# Patient Record
Sex: MALE | Race: BLACK OR AFRICAN AMERICAN | NOT HISPANIC OR LATINO | Employment: FULL TIME | URBAN - METROPOLITAN AREA
[De-identification: names, ages, dates, MRNs, and addresses within clinical notes are randomized per-mention and may not be internally consistent; named-entity substitution may affect disease eponyms.]

---

## 2018-01-15 ENCOUNTER — PRE VISIT (OUTPATIENT)
Dept: UROLOGY | Facility: CLINIC | Age: 38
End: 2018-01-15

## 2018-01-15 NOTE — TELEPHONE ENCOUNTER
Patient with history of vasectomy coming in for fertility option discussion. Patient chart reviewed, no need for call, all records available and ready for appointment.

## 2018-01-19 ENCOUNTER — OFFICE VISIT (OUTPATIENT)
Dept: UROLOGY | Facility: CLINIC | Age: 38
End: 2018-01-19
Payer: COMMERCIAL

## 2018-01-19 ENCOUNTER — APPOINTMENT (OUTPATIENT)
Dept: LAB | Facility: CLINIC | Age: 38
End: 2018-01-19
Payer: COMMERCIAL

## 2018-01-19 VITALS
BODY MASS INDEX: 24.65 KG/M2 | WEIGHT: 182 LBS | HEIGHT: 72 IN | DIASTOLIC BLOOD PRESSURE: 71 MMHG | SYSTOLIC BLOOD PRESSURE: 129 MMHG | HEART RATE: 67 BPM

## 2018-01-19 DIAGNOSIS — Z98.52 S/P VASECTOMY: Primary | ICD-10-CM

## 2018-01-19 LAB — FSH SERPL-ACNC: 11.9 IU/L (ref 0.7–10.8)

## 2018-01-19 ASSESSMENT — PAIN SCALES - GENERAL: PAINLEVEL: NO PAIN (0)

## 2018-01-19 NOTE — MR AVS SNAPSHOT
After Visit Summary   1/19/2018    David Roberts    MRN: [id number]           Patient Information     Date Of Birth          1980        Visit Information        Provider Department      1/19/2018 11:00 AM Yobani Castro MD [facility]        Today's Diagnoses     S/P vasectomy    -  1      Care Instructions    Financial counselor Damaris [phone].  Please get blood labs done today.    It was a pleasure meeting with you today.  Thank you for allowing me and my team the privilege of caring for you today.  YOU are the reason we are here, and I truly hope we provided you with the excellent service you deserve.  Please let us know if there is anything else we can do for you so that we can be sure you are leaving completely satisfied with your care experience.                  Follow-ups after your visit        Who to contact     Please call your clinic at [phone] to:    Ask questions about your health    Make or cancel appointments    Discuss your medicines    Learn about your test results    Speak to your doctor   If you have compliments or concerns about an experience at your clinic, or if you wish to file a complaint, please contact [facility] Patient Relations at [phone] or email us at [email or web address]         Additional Information About Your Visit        MyChart Information     [employer] is an electronic gateway that provides easy, online access to your medical records. With [employer], you can request a clinic appointment, read your test results, renew a prescription or communicate with your care team.     To sign up for [employer] visit the website at [email or web address]   You will be asked to enter the access code listed below, as well as some personal information. Please follow the directions to create your username and password.     Your access code is: [email or web address]  Expires:  2018  6:30 AM     Your access code will  in 90 days. If you need help or a new code, please contact your [facility] or call [phone] for assistance.        Care EveryWhere ID     This is your Care EveryWhere ID. This could be used by other organizations to access your [place] medical records  [id number]        Your Vitals Were     Pulse Height BMI (Body Mass Index)             67 1.829 m (6') 24.68 kg/m2          Blood Pressure from Last 3 Encounters:   18 129/71   16 128/77   16 125/87    Weight from Last 3 Encounters:   18 82.6 kg (182 lb)   16 86.2 kg (190 lb)   16 84.1 kg (185 lb 6.4 oz)              We Performed the Following     Estradiol ultrasensitive     Follicle stimulating hormone     Testosterone Free and Total        Primary Care Provider Office Phone # Fax #    Dinah Chapin Woo [phone] [address], [place] [address]        Equal Access to Services     [facility]: Hadii aad ku hadasho Soomaali, waaxda luqadaha, qaybta kaalmada adeadalid, william rick . So [facility] [phone].    ATENCIÓN: Si habla español, tiene a savage disposición servicios gratuitos de asistencia lingüística. [facility] [phone].    We comply with applicable federal civil rights laws and [place] laws. We do not discriminate on the basis of race, color, national origin, age, disability, sex, sexual orientation, or gender identity.            Thank you!     Thank you for choosing [facility] AND [facility]  for your care. Our goal is always to provide you with excellent care. Hearing back from our patients is one way we can continue to improve our services. Please take a few minutes to complete the written survey that you may receive in the mail after your visit with us. Thank you!             Your Updated Medication List - Protect others around  you: Learn how to safely use, store and throw away your medicines at www.disposemymeds.org.      Notice  As of 1/19/2018 11:38 AM    You have not been prescribed any medications.

## 2018-01-19 NOTE — LETTER
1/19/2018       RE: David Roberts  [address]  [place] [address]     Dear Colleague,    Thank you for referring your patient, David Roberts, to the [facility] AND [facility]ST FOR PROSTATE AND UROLOGIC CANCERS at [facility]. Please see a copy of my visit note below.      Interested in vas reversal.    Testes 20-22 bilaterally.     Vas defect palpable in the mid to proximal vas bilaterally.  No tenderness.              Again, thank you for allowing me to participate in the care of your patient.      Sincerely,    Yobani Castro MD

## 2018-01-19 NOTE — NURSING NOTE
Chief Complaint   Patient presents with     RECHECK     Discuss fertility options- history of vasectomy     Abbey Longo RN

## 2018-01-19 NOTE — PROGRESS NOTES
Mr. David Roberts is here to discuss fertility options post vasectomy . He was seen almost 2 years ago with the same concerns.    HPI:  David Roberts  is a pleasant 37 year old gentleman who underwent a vasectomy around 2008.  He has had 4 children with a previous spouse. His vasectomy was uncomplicated, and he did do a followup semen analysis which demonstrated sterility. He has remarried and the couple would like a child together.    His spouse is Ceasar, aged ~37. She has had several children  .  Her health is good, cycles are regular , and female factor concerns are: advancing age     He is here to discuss options again for fertility post vasectomy.     REVIEW OF SYSTEMS:  General: negative  Skin: negative  Eyes: negative  Ears/Nose/Throat: negative  Respiratory: negative  Cardiovascular: negative  Gastrointestinal: negative  Genitourinary: see HPI  Musculoskeletal: negative  Neurologic: negative  Psychiatric: negative  Hematologic/Lymphatic/Immunologic: negative  Endocrine: negative      Past Medical History:   Diagnosis Date     Back pain, chronic 11/16/2009    Intermittent , mechanical     Diabetes mellitus (H)         Past Surgical History:   Procedure Laterality Date     HERNIA REPAIR, INGUINAL RT/LT  94,97     VASECTOMY  2007        Family History   Problem Relation Age of Onset     DIABETES Mother      Hypertension No family hx of      Thyroid Disease No family hx of      Glaucoma No family hx of      Macular Degeneration No family hx of      CEREBROVASCULAR DISEASE No family hx of      C.A.D. No family hx of      Leukemia Son 3        Social History   Substance Use Topics     Smoking status: Never Smoker     Smokeless tobacco: Never Used     Alcohol use Yes      Comment: rarely        Medications as of 4/4/2018:  No current outpatient prescriptions on file.     No current facility-administered medications for this visit.           Allergies   Allergen Reactions     Chloroquine      Other  [Seasonal Allergies]      NEVAQUINE       GENERAL PHYSICAL EXAM:   /71  Pulse 67  Ht 1.829 m (6')  Wt 82.6 kg (182 lb)  BMI 24.68 kg/m2     Constitutional: No acute distress. Well nourished.   PSYCH: normal mood and affect.  NEURO: normal gait, no focal deficits.   EYES: anicteric, EOMI, PERR.  CARDIOPULMONARY: breathing non-labored, pulse regular rate/rhythm, no peripheral edema.  GI: Abdomen: nondistended   MUSCULOSKELETAL: normal limb proportions, no muscle wasting, no contractures.  SKIN: Normal virilized hair distribution, no lesions, warts or rashes over genitalia, abdomen extremities or face.  HEME/LYMPH: no echymosis, no lymphadenopathy in groin, no lymphedema.     EXAM:  Phallus normal   Left testis descended , size is 20-22 , consistency is normal . No intra-testicular masses.   Right testis descended , size is 20-22 , consistency is normal . No intra-testicular masses.   Epididymes present, non-tender, not-enlarged.   Left cord: Vas present. No large granuloma.  Defect in the mid to proximal portion of the left vas.  Right cord: Vas present. No large granuloma.  Defect in the mid to proximal  portion of the right vas.    Prostate exam: deferred    Imaging/labs:  Lab Results   Component Value Date    CR 1.22 07/26/2016    CR 1.24 11/12/2014    CR 1.16 08/19/2013     No results found for: PSA    ASSESSMENT: Fertility options post vasectomy    PLAN:  I reviewed the pros and cons of vasectomy reversal versus  sperm acquisition for use in in-vitro fertilization.   We discussed the considerations of invasiveness to either partner, the number of children desired, cost, and need for possible re-sterilization after a vasectomy reversal.   Both reversal and IVF are options that might be successful.  We discussed the patency and pregnancy rates with vasovasostomy and vasoepididymostomy.  We discussed that he hopefully would be a vasovasostomy on each side, but certainly could require a vasoepididymostomy  on one or both sides, and that the likelihood of VE increases as time from the initial vasectomy increases.  We discussed sperm retrieval, which could likely be done with testicular aspiration under local in the office and this could then be cryopreserved and used for IVF    I encouraged them to think about their options and let me know how they would like to proceed.     20 min visit, over 50% face to face in counseling/discussion of fertility options today.    Yobani Castro MD,    Urological Surgeon

## 2018-01-19 NOTE — LETTER
February 1, 2018       TO: David Roberts  [address]  [place] [address]       Dear ,    We are writing to inform you of your test results.  Testosterone level is great.   Testosterone to estrogen ratio is normal.   The FSH level is elevated; I prefer to see this under 7 or so.  Higher FSH typically indicates decreased sperm production ability in the testes, and the brain raises the FSH to try to drive more sperm production.     Still his options for fertility are testis biopsy and IVF versus vasectomy reversal.  Higher FSH might mean less sperm count if he were to choose reversal.     Resulted Orders   Follicle stimulating hormone   Result Value Ref Range    FSH 11.9 (H) 0.7 - 10.8 IU/L   Testosterone Free and Total   Result Value Ref Range    Testosterone Total 501 240 - 950 ng/dL      Comment:      This test was developed and its performance characteristics determined by the   [facility],  Special [facility]. It has   not been cleared or approved by the FDA. The laboratory is regulated under   CLIA as qualified to perform high-complexity testing. This test is used for   clinical purposes. It should not be regarded as investigational or for   research.      Sex Hormone Binding Globulin 41 11 - 80 nmol/L    Free Testosterone Calculated 9.18 4.7 - 24.4 ng/dL   Estradiol ultrasensitive   Result Value Ref Range    Estradiol Ultrasensitive 21 10 - 40 pg/mL      Comment:      Reference Ranges  Prepubertal Males:  0-13 pg/mL  Adult Males:  10-40 pg/mL  This test was developed and its performance characteristics determined by the   [facility],  [facility]. It has   not been cleared or approved by the FDA. The laboratory is regulated under   CLIA as qualified to perform high-complexity testing. This test is used for   clinical purposes. It should not be regarded as investigational or for   research.          Yobani Castro MD

## 2018-01-19 NOTE — PATIENT INSTRUCTIONS
Financial counselor Damaris [phone].  Please get blood labs done today.    It was a pleasure meeting with you today.  Thank you for allowing me and my team the privilege of caring for you today.  YOU are the reason we are here, and I truly hope we provided you with the excellent service you deserve.  Please let us know if there is anything else we can do for you so that we can be sure you are leaving completely satisfied with your care experience.

## 2018-01-23 LAB
ESTRADIOL SERPL HS-MCNC: 21 PG/ML (ref 10–40)
SHBG SERPL-SCNC: 41 NMOL/L (ref 11–80)
TESTOST FREE SERPL-MCNC: 9.18 NG/DL (ref 4.7–24.4)
TESTOST SERPL-MCNC: 501 NG/DL (ref 240–950)

## 2018-02-12 ENCOUNTER — PRE VISIT (OUTPATIENT)
Dept: UROLOGY | Facility: CLINIC | Age: 38
End: 2018-02-12

## 2018-02-12 ENCOUNTER — TELEPHONE (OUTPATIENT)
Dept: UROLOGY | Facility: CLINIC | Age: 38
End: 2018-02-12

## 2018-02-12 NOTE — TELEPHONE ENCOUNTER
Patient with history of vasectomy coming in for fertility discussion, IVF discussion. Patient chart reviewed, no need for call, all records available and ready for appointment.

## 2018-02-12 NOTE — TELEPHONE ENCOUNTER
----- Message from Yobani Castro MD sent at 2/9/2018  1:16 PM CST -----  Contact: [phone]  I would lean towards testis sperm biopsy and IVF for them.  I think a good next step is for them to see IVF clinic, and they will let us know when to do the testis biopsy.    There are 4 clinics in the [facility] that do all aspects of advanced female infertility care:     1. [facility] [place].  [email or web address].  Office in [place].     2. [facility] [email or web address]  Offices in [facility].        3. [facility] [email or web address].  Office in [place].     4. [facility] [email or web address].  Office in [facility].     If they don't prefer IVF, a reversal is possible but I think he may have fertility problems with that given his elevated FSH and also advanced female age.      I'm happy to see him back to discuss results in depth if needed.    Thank You  ROSEMARY    ----- Message -----     From: Joyce Flores LPN     Sent: 2/6/2018   9:38 AM       To: Yobani Castro MD    Patient called and wants to know what to do next??? Your note stated testis biopsy or IVF last resort vasectomy reversal.  Should he come back in and talk or set up testis biopsy?? joyce

## 2018-02-27 ENCOUNTER — PRE VISIT (OUTPATIENT)
Dept: UROLOGY | Facility: CLINIC | Age: 38
End: 2018-02-27

## 2018-04-26 ENCOUNTER — TELEPHONE (OUTPATIENT)
Dept: FAMILY MEDICINE | Facility: CLINIC | Age: 38
End: 2018-04-26

## 2018-04-26 ENCOUNTER — TELEPHONE (OUTPATIENT)
Dept: NURSING | Facility: CLINIC | Age: 38
End: 2018-04-26

## 2018-04-26 ENCOUNTER — OFFICE VISIT (OUTPATIENT)
Dept: FAMILY MEDICINE | Facility: CLINIC | Age: 38
End: 2018-04-26
Payer: COMMERCIAL

## 2018-04-26 VITALS
HEART RATE: 85 BPM | HEIGHT: 72 IN | SYSTOLIC BLOOD PRESSURE: 110 MMHG | DIASTOLIC BLOOD PRESSURE: 72 MMHG | WEIGHT: 173 LBS | BODY MASS INDEX: 23.43 KG/M2 | OXYGEN SATURATION: 97 % | TEMPERATURE: 98 F

## 2018-04-26 DIAGNOSIS — Z13.89 SCREENING FOR DIABETIC PERIPHERAL NEUROPATHY: ICD-10-CM

## 2018-04-26 DIAGNOSIS — E11.65 TYPE 2 DIABETES MELLITUS WITH HYPERGLYCEMIA, WITHOUT LONG-TERM CURRENT USE OF INSULIN (H): ICD-10-CM

## 2018-04-26 DIAGNOSIS — Z00.01 ENCOUNTER FOR ROUTINE ADULT HEALTH EXAMINATION WITH ABNORMAL FINDINGS: Primary | ICD-10-CM

## 2018-04-26 DIAGNOSIS — E11.9 TYPE 2 DIABETES MELLITUS WITHOUT COMPLICATION, WITHOUT LONG-TERM CURRENT USE OF INSULIN (H): ICD-10-CM

## 2018-04-26 DIAGNOSIS — Z13.6 CARDIOVASCULAR SCREENING; LDL GOAL LESS THAN 100: ICD-10-CM

## 2018-04-26 LAB
ALBUMIN SERPL-MCNC: 4 G/DL (ref 3.4–5)
ALP SERPL-CCNC: 139 U/L (ref 40–150)
ALT SERPL W P-5'-P-CCNC: 32 U/L (ref 0–70)
ANION GAP SERPL CALCULATED.3IONS-SCNC: 6 MMOL/L (ref 3–14)
AST SERPL W P-5'-P-CCNC: 17 U/L (ref 0–45)
BILIRUB SERPL-MCNC: 0.5 MG/DL (ref 0.2–1.3)
BUN SERPL-MCNC: 19 MG/DL (ref 7–30)
CALCIUM SERPL-MCNC: 9.3 MG/DL (ref 8.5–10.1)
CHLORIDE SERPL-SCNC: 91 MMOL/L (ref 94–109)
CO2 SERPL-SCNC: 29 MMOL/L (ref 20–32)
CREAT SERPL-MCNC: 1.23 MG/DL (ref 0.66–1.25)
CREAT UR-MCNC: 36 MG/DL
GFR SERPL CREATININE-BSD FRML MDRD: 66 ML/MIN/1.7M2
GLUCOSE SERPL-MCNC: 569 MG/DL (ref 70–99)
HBA1C MFR BLD: 13.2 % (ref 0–5.6)
MICROALBUMIN UR-MCNC: <5 MG/L
MICROALBUMIN/CREAT UR: NORMAL MG/G CR (ref 0–17)
POTASSIUM SERPL-SCNC: 4.1 MMOL/L (ref 3.4–5.3)
PROT SERPL-MCNC: 7.5 G/DL (ref 6.8–8.8)
SODIUM SERPL-SCNC: 126 MMOL/L (ref 133–144)
TSH SERPL DL<=0.005 MIU/L-ACNC: 1.26 MU/L (ref 0.4–4)

## 2018-04-26 PROCEDURE — 84443 ASSAY THYROID STIM HORMONE: CPT | Performed by: PHYSICIAN ASSISTANT

## 2018-04-26 PROCEDURE — 36415 COLL VENOUS BLD VENIPUNCTURE: CPT | Performed by: PHYSICIAN ASSISTANT

## 2018-04-26 PROCEDURE — 82043 UR ALBUMIN QUANTITATIVE: CPT | Performed by: PHYSICIAN ASSISTANT

## 2018-04-26 PROCEDURE — 99213 OFFICE O/P EST LOW 20 MIN: CPT | Mod: 25 | Performed by: PHYSICIAN ASSISTANT

## 2018-04-26 PROCEDURE — 80053 COMPREHEN METABOLIC PANEL: CPT | Performed by: PHYSICIAN ASSISTANT

## 2018-04-26 PROCEDURE — 83036 HEMOGLOBIN GLYCOSYLATED A1C: CPT | Performed by: PHYSICIAN ASSISTANT

## 2018-04-26 PROCEDURE — 99207 C FOOT EXAM  NO CHARGE: CPT | Mod: 25 | Performed by: PHYSICIAN ASSISTANT

## 2018-04-26 PROCEDURE — 99395 PREV VISIT EST AGE 18-39: CPT | Performed by: PHYSICIAN ASSISTANT

## 2018-04-26 PROCEDURE — 84681 ASSAY OF C-PEPTIDE: CPT | Performed by: PHYSICIAN ASSISTANT

## 2018-04-26 RX ORDER — METFORMIN HCL 500 MG
TABLET, EXTENDED RELEASE 24 HR ORAL
Qty: 120 TABLET | Refills: 0 | Status: SHIPPED | OUTPATIENT
Start: 2018-04-26 | End: 2018-04-26

## 2018-04-26 RX ORDER — METFORMIN HCL 500 MG
TABLET, EXTENDED RELEASE 24 HR ORAL
Qty: 120 TABLET | Refills: 0 | Status: SHIPPED | OUTPATIENT
Start: 2018-04-26 | End: 2018-05-30

## 2018-04-26 ASSESSMENT — ANXIETY QUESTIONNAIRES
1. FEELING NERVOUS, ANXIOUS, OR ON EDGE: NOT AT ALL
2. NOT BEING ABLE TO STOP OR CONTROL WORRYING: NOT AT ALL
IF YOU CHECKED OFF ANY PROBLEMS ON THIS QUESTIONNAIRE, HOW DIFFICULT HAVE THESE PROBLEMS MADE IT FOR YOU TO DO YOUR WORK, TAKE CARE OF THINGS AT HOME, OR GET ALONG WITH OTHER PEOPLE: NOT DIFFICULT AT ALL
6. BECOMING EASILY ANNOYED OR IRRITABLE: NOT AT ALL
7. FEELING AFRAID AS IF SOMETHING AWFUL MIGHT HAPPEN: NOT AT ALL
GAD7 TOTAL SCORE: 1
5. BEING SO RESTLESS THAT IT IS HARD TO SIT STILL: NOT AT ALL
3. WORRYING TOO MUCH ABOUT DIFFERENT THINGS: NOT AT ALL

## 2018-04-26 ASSESSMENT — PATIENT HEALTH QUESTIONNAIRE - PHQ9: 5. POOR APPETITE OR OVEREATING: SEVERAL DAYS

## 2018-04-26 ASSESSMENT — PAIN SCALES - GENERAL: PAINLEVEL: MODERATE PAIN (4)

## 2018-04-26 NOTE — PATIENT INSTRUCTIONS
Start metformin 500mg with evening meal for one week   then 500mg twice a day with meals for one week then   500mg with breakfast in am and 1000 mg with evening meal for one week   Then 1000 mg twice a day with meals  Start insulin 14 units lantus at bedtime  Check blood sugars four times a day and keep log of food intake and blood sugars   Follow up with diabetes education AS SOON AS POSSIBLE - no later than Monday.     Please schedule a fasting lab appointment (nothing to eat or drink 10 hours prior except water and/or your medications) at your earliest convenience.      Schedule an eye exam either at [facility] ([phone]) or [facility]     Follow up with us in clinic in 3 weeks- we need to talk about other medications needed in addition to those prescribed today    Call us with any questions, side effects or concerns.         Preventive Health Recommendations  Male Ages 26 - 39    Yearly exam:             See your health care provider every year in order to  o   Review health changes.   o   Discuss preventive care.    o   Review your medicines if your doctor has prescribed any.    You should be tested each year for STDs (sexually transmitted diseases), if you re at risk.     After age 35, talk to your provider about cholesterol testing. If you are at risk for heart disease, have your cholesterol tested at least every 5 years.     If you are at risk for diabetes, you should have a diabetes test (fasting glucose).  Shots: Get a flu shot each year. Get a tetanus shot every 10 years.     Nutrition:    Eat at least 5 servings of fruits and vegetables daily.     Eat whole-grain bread, whole-wheat pasta and brown rice instead of white grains and rice.     Talk to your provider about Calcium and Vitamin D.     Lifestyle    Exercise for at least 150 minutes a week (30 minutes a day, 5 days a week). This will help you control your weight and prevent disease.     Limit  alcohol to one drink per day.     No smoking.     Wear sunscreen to prevent skin cancer.     See your dentist every six months for an exam and cleaning.

## 2018-04-26 NOTE — PROGRESS NOTES
SUBJECTIVE:   CC: David Roberts is an 38 year old male who presents for preventative health visit.     Healthy Habits:    Do you get at least three servings of calcium containing foods daily (dairy, green leafy vegetables, etc.)? Some days    Amount of exercise or daily activities, outside of work: 3-4 day(s) per week    Problems taking medications regularly No    Medication side effects: No    Have you had an eye exam in the past two years? Yes- likely has been two years    Do you see a dentist twice per year? Once a year    Do you have sleep apnea, excessive snoring or daytime drowsiness?no           Today's PHQ-2 Score:   PHQ-2 ( 1999 [employer]) 7/26/2016 11/12/2014   Q1: Little interest or pleasure in doing things 0 0   Q2: Feeling down, depressed or hopeless 0 0   PHQ-2 Score 0 0       Abuse: Current or Past(Physical, Sexual or Emotional)- No  Do you feel safe in your environment - Yes    Social History   Substance Use Topics     Smoking status: Never Smoker     Smokeless tobacco: Never Used     Alcohol use Yes      Comment: rarely      If you drink alcohol do you typically have >3 drinks per day or >7 drinks per week? No                      Last PSA: No results found for: PSA    Reviewed orders with patient. Reviewed health maintenance and updated orders accordingly - Yes  BP Readings from Last 3 Encounters:   04/26/18 110/72   01/19/18 129/71   07/26/16 128/77    Wt Readings from Last 3 Encounters:   04/26/18 78.5 kg (173 lb)   01/19/18 82.6 kg (182 lb)   07/26/16 86.2 kg (190 lb)                  Patient Active Problem List   Diagnosis     Back pain, chronic     CARDIOVASCULAR SCREENING; LDL GOAL LESS THAN 100     S/P vasectomy     Type 2 diabetes mellitus without complication (H)     Dyshidrotic eczema     Past Surgical History:   Procedure Laterality Date     HERNIA REPAIR, INGUINAL RT/LT  94,97     VASECTOMY  2007       Social History   Substance Use Topics     Smoking status: Never Smoker      Smokeless tobacco: Never Used     Alcohol use Yes      Comment: rarely     Family History   Problem Relation Age of Onset     DIABETES Mother      Leukemia Son 3     Hypertension No family hx of      Thyroid Disease No family hx of      Glaucoma No family hx of      Macular Degeneration No family hx of      CEREBROVASCULAR DISEASE No family hx of      C.A.D. No family hx of          Current Outpatient Prescriptions   Medication Sig Dispense Refill     blood glucose (NO BRAND SPECIFIED) lancets standard Use to test blood sugar 4 times daily or as directed. 150 each 11     blood glucose monitoring (NO BRAND SPECIFIED) meter device kit Use to test blood sugar 4 times daily or as directed. 1 kit 0     blood glucose monitoring (NO BRAND SPECIFIED) test strip Use to test blood sugars 4 times daily or as directed 150 strip 11     insulin glargine (LANTUS SOLOSTAR) 100 UNIT/ML injection Inject 14 Units Subcutaneous At Bedtime 15 mL 0     metFORMIN (GLUCOPHAGE-XR) 500 MG 24 hr tablet Take 500mg with evening meal for 7 days then 500mg twice a day for 7 days then 500mg with breakfast and 1000 mg with dinner then 1000 mg twice a day 120 tablet 0     Recent Labs   Lab Test  04/26/18   1522  07/26/16   1608  11/12/14   1623  08/19/13   0956   07/30/12   1101  05/18/12   0840  02/14/12   1344   A1C  13.2*  6.8*  5.8  5.6   < >  5.7  6.5*   --    LDL   --   111*  81  99   --   84  106  Cannot estimate LDL when triglyceride exceeds 400 mg/dL  49   HDL   --   38*   --   36*   --   32*  30*  25*   TRIG   --   126   --   97   --   103  80  482*   ALT   --    --   30  27   --    --   27  36   CR   --   1.22  1.24  1.16   < >   --   1.18  1.26*   GFRESTIMATED   --   67  67  73   < >   --   72  67   GFRESTBLACK   --   81  80  88   < >   --   87  81   POTASSIUM   --    --   3.9  3.9   < >   --   3.6  4.8   TSH   --   2.11   --    --    --    --    --   1.36    < > = values in this interval not displayed.        Reviewed and updated as  needed this visit by clinical staff  Tobacco  Allergies  Meds  Problems  Med Hx  Surg Hx  Fam Hx         Reviewed and updated as needed this visit by Provider  Allergies  Meds  Problems  Med Hx  Surg Hx  Fam Hx          For about a month has noted more frequency of urination and excessive thirst.  Had appointment scheduled in a week and a half but unable to tolerate until that time. Called in this morning and scheduled appointment today  Not checking blood sugars.  On insulin in past.    No diarrhea or vomiting but one of the medications prescribed for his diabetes in past caused excessive fatigue- he cannot recall name of that medication even with prompting.    Losing weight-reports was 190 now 173 today  Wt Readings from Last 4 Encounters:   04/26/18 78.5 kg (173 lb)   01/19/18 82.6 kg (182 lb)   07/26/16 86.2 kg (190 lb)   03/24/16 84.1 kg (185 lb 6.4 oz)       Diet not much appetite 2 weeks.   Eating right foods.  No abdominal pain or vomiting.  No fever, sweats, chills.   No diarrhea  Denies numbness or tingling of hands or feet.  No chest pain or shortness of breath.    Mother has diabetes- he doesn't know type  Getting up frequently during the night to urinate       ROS:  CONSTITUTIONAL:positive for weight loss and polydipsia  I: NEGATIVE for worrisome rashes, moles or lesions  E: NEGATIVE for vision changes or irritation  ENT: NEGATIVE for ear, mouth and throat problems  R: NEGATIVE for significant cough or SOB  CV: NEGATIVE for chest pain, palpitations or peripheral edema  GI: NEGATIVE for nausea, abdominal pain, heartburn, or change in bowel habits   male: polyuria, no masses or rahses  M: NEGATIVE for significant arthralgias or myalgia  N: NEGATIVE for weakness, dizziness or paresthesias  P: NEGATIVE for changes in mood or affect    OBJECTIVE:   /72 (BP Location: Right arm, Patient Position: Chair)  Pulse 85  Temp 98  F (36.7  C) (Oral)  Ht 1.829 m (6')  Wt 78.5 kg (173 lb)  SpO2  97%  BMI 23.46 kg/m2  EXAM:  GENERAL: healthy, alert and no distress  EYES: Eyes grossly normal to inspection, PERRL and conjunctivae and sclerae normal  HENT: ear canals and TM's normal, nose and mouth without ulcers or lesions  NECK: no adenopathy, no asymmetry, masses, or scars and thyroid normal to palpation  RESP: lungs clear to auscultation - no rales, rhonchi or wheezes  CV: regular rate and rhythm, normal S1 S2, no S3 or S4, no murmur, click or rub, no peripheral edema and peripheral pulses strong  ABDOMEN: soft, nontender, no hepatosplenomegaly, no masses and bowel sounds normal  MS: no gross musculoskeletal defects noted, no edema  SKIN: no suspicious lesions or rashes  NEURO: Normal strength and tone, mentation intact and speech normal  PSYCH: mentation appears normal, affect normal/bright  Diabetic foot exam: normal DP and PT pulses, no trophic changes or ulcerative lesions and normal monofilament exam, except for findings noted on diabetic foot graphical image.    Decreased sensation of monofilament of 10, 1,2, 3 bilaterally             ASSESSMENT/PLAN:   1. Encounter for routine adult health examination with abnormal findings      2. Type 2 diabetes mellitus with hyperglycemia, without long-term current use of insulin (H)  A1C of 13.2.  Last checked A1C was 6.8 7/26/16.     Stressed importance of diabetes education and starting metformin and insulin.  Patient reports unable to meet with diabetes educator tomorrow  Has used insulin in remote past.   Will start with metformin and 10 units insulin at bedtime.  Encouraged to keep log of blood sugars and meals and patient unwilling to commit to scheduling appointment with diabetes educator tomorrow- advised Monday at latest  Reached out to MTM but she is out of the office until next week  Warning signs and symptoms warranting emergency department evaluation reviewed with patient   Patient informed me that lantus not covered and wants to use [facility] across the  street here so medications reordered to that location with determir instead of lantus    detemir 10 units at bedtime.   Reviewed with supervising physician  Reviewed low sodium and anion gap with blood sugar of 569 but anion gap of 6    No vomiting or abdominal pain or diarrhea.  Phone call attempt to patient when blood sugar came back      - metFORMIN (GLUCOPHAGE-XR) 500 MG 24 hr tablet; Take 500mg with evening meal for 7 days then 500mg twice a day for 7 days then 500mg with breakfast and 1000 mg with dinner then 1000 mg twice a day  Dispense: 120 tablet; Refill: 0  - insulin detemir 10 units Subcutaneous At Bedtime  Dispense: 3 mL; Refill: 0  - DIABETES EDUCATOR REFERRAL  - NUTRITION REFERRAL  - blood glucose monitoring (NO BRAND SPECIFIED) test strip; Use to test blood sugars 4 times daily or as directed  Dispense: 150 strip; Refill: 11  - blood glucose monitoring (NO BRAND SPECIFIED) meter device kit; Use to test blood sugar 4 times daily or as directed.  Dispense: 1 kit; Refill: 0  - blood glucose (NO BRAND SPECIFIED) lancets standard; Use to test blood sugar 4 times daily or as directed.  Dispense: 150 each; Refill: 11  - OPTOMETRY REFERRAL  - C-peptide      3. Type 2 diabetes mellitus without complication, without long-term current use of insulin (H)    - Hemoglobin A1c  - Comprehensive metabolic panel  - Albumin Random Urine Quantitative with Creat Ratio  - TSH with free T4 reflex  - Lipid panel reflex to direct LDL Fasting; Future    4. CARDIOVASCULAR SCREENING; LDL GOAL LESS THAN 100  Not fasting today- will need to return for fasting labs   - Comprehensive metabolic panel  - Lipid panel reflex to direct LDL Fasting; Future    5. Screening for diabetic peripheral neuropathy  Some monofilament changes  - FOOT EXAM  NO CHARGE [48111.114]    COUNSELING:  Reviewed preventive health counseling, as reflected in patient instructions       Regular exercise       Healthy diet/nutrition       Vision screening        reports that he has never smoked. He has never used smokeless tobacco.    Estimated body mass index is 24.68 kg/(m^2) as calculated from the following:    Height as of 1/19/18: 1.829 m (6').    Weight as of 1/19/18: 82.6 kg (182 lb).       Counseling Resources:  ATP IV Guidelines  Pooled Cohorts Equation Calculator  FRAX Risk Assessment  ICSI Preventive Guidelines  Dietary Guidelines for Americans, 2010  USDA's MyPlate  ASA Prophylaxis  Lung CA Screening    Ly Means PA-C  [employer]

## 2018-04-26 NOTE — MR AVS SNAPSHOT
After Visit Summary   4/26/2018    David Roberts    MRN: [id number]           Patient Information     Date Of Birth          1980        Visit Information        Provider Department      4/26/2018 3:20 PM Ly Means PA-C [facility]        Today's Diagnoses     Type 2 diabetes mellitus without complication, without long-term current use of insulin (H)    -  1    CARDIOVASCULAR SCREENING; LDL GOAL LESS THAN 100        Screening for diabetic peripheral neuropathy        Type 2 diabetes mellitus with hyperglycemia, without long-term current use of insulin (H)          Care Instructions    Start metformin 500mg with evening meal for one week   then 500mg twice a day with meals for one week then   500mg with breakfast in am and 1000 mg with evening meal for one week   Then 1000 mg twice a day with meals  Start insulin 14 units lantus at bedtime  Check blood sugars four times a day and keep log of food intake and blood sugars   Follow up with diabetes education AS SOON AS POSSIBLE - no later than Monday.     Please schedule a fasting lab appointment (nothing to eat or drink 10 hours prior except water and/or your medications) at your earliest convenience.      Schedule an eye exam either at [facility] ([phone]) or [facility]     Follow up with us in clinic in 3 weeks- we need to talk about other medications needed in addition to those prescribed today    Call us with any questions, side effects or concerns.         Preventive Health Recommendations  Male Ages 26 - 39    Yearly exam:             See your health care provider every year in order to  o   Review health changes.   o   Discuss preventive care.    o   Review your medicines if your doctor has prescribed any.    You should be tested each year for STDs (sexually transmitted diseases), if you re at risk.     After age 35, talk to your provider about cholesterol  testing. If you are at risk for heart disease, have your cholesterol tested at least every 5 years.     If you are at risk for diabetes, you should have a diabetes test (fasting glucose).  Shots: Get a flu shot each year. Get a tetanus shot every 10 years.     Nutrition:    Eat at least 5 servings of fruits and vegetables daily.     Eat whole-grain bread, whole-wheat pasta and brown rice instead of white grains and rice.     Talk to your provider about Calcium and Vitamin D.     Lifestyle    Exercise for at least 150 minutes a week (30 minutes a day, 5 days a week). This will help you control your weight and prevent disease.     Limit alcohol to one drink per day.     No smoking.     Wear sunscreen to prevent skin cancer.     See your dentist every six months for an exam and cleaning.             Follow-ups after your visit        Additional Services     DIABETES EDUCATOR REFERRAL       DIABETES SELF MANAGEMENT TRAINING (DSMT)      Your provider has referred you to Diabetes Education: FMG: Diabetes Education - All [facility] [phone]   [email or web address]     If an urgent visit is needed or A1C is above 12, Care Team to call the Diabetes  Education Team at [phone] or send an In Basket message to the Diabetes Education Pool (P DIAB ED-PATIENT CARE).    A  will call you to make your appointment. If it has been more than 3 business days since your referral was placed, please call the above phone number to schedule.    Type of training and number of hours: Previous Diagnosis: Follow-up DSMT - 2 hours.    Diabetes Type: Type 2 - On Insulin   Medicare covers: 10 hours of initial DSMT in 12 month period from the time of first visit, plus 2 hours of follow-up DSMT annually, and additional hours as requested for insulin training.         Diabetes Co-Morbidities: none               A1C Goal:  <7.0       A1C is: Lab Results       Component                 Value               Date                       A1C                      13.2                04/26/2018              MEDICAL NUTRITION THERAPY (MNT) for Diabetes    Medical Nutrition Therapy with a Registered Dietitian can be provided in coordination with Diabetes Self-Management Training to assist in achieving optimal diabetes management.    MNT Type and Hours: Previous diagnosis: Annual follow-up MNT - 2 hours                       Medicare will cover: 3 hours initial MNT in 12 month period after first visit, plus 2 hours of follow-up MNT annually        Diabetes Education Topics: Comprehensive Knowledge Assessment and Instruction, Knowledge: Healthy Eating, Being Active, Monitoring Blood Sugar, Taking Medication, Problem Solving/Goal Setting, Reducing Risks (Preventing Acute and Chronic Diabetes Complications) and Healthy Coping, Blood glucose meter instruction  and Medication Start: Insulin: Type/Dose/Timing: start 14 units at bedtime and adjust from there.  Restart metformin    Special Educational Needs Requiring Individual DSMT: None      Please be aware that coverage of these services is subject to the terms and limitations of your health insurance plan.  Call member services at your health plan to determine Diabetes Self-Management Training (Codes  and ) and Medical Nutrition Therapy (Codes 69321 and 16161) benefits and ask which blood glucose monitor brands are covered by your plan.  Please bring the following with you to your appointment:    (1)  List of current medications   (2)  List of Blood Glucose Monitor brands that are covered by your insurance plan  (3)  Blood Glucose Monitor and log book  (4)   Food records for the 3 days prior to your visit    The Certified Diabetes Educator may make diabetes medication adjustments per the CDE Protocol and Collaborative Practice Agreement.            NUTRITION REFERRAL       Your provider has referred you to: FMG: [facility]  [place] [phone]   [email or web address]/    Please be aware that coverage of these services is subject to the terms and limitations of your health insurance plan.  Call member services at your health plan with any benefit or coverage questions.      Please bring the following to your appointment:      >>   This referral request   >>   Any documents given to you for this referral  >>   Any specific questions you have about diet or food choices            OPTOMETRY REFERRAL       Your provider has referred you to: FMG: [facility] [phone]    [email or web address]/  FMG: [facility] [phone]    [email or web address]/    Please be aware that coverage of these services is subject to the terms and limitations of your health insurance plan.  Call member services at your health plan with any benefit or coverage questions.      Please bring the following with you to your appointment:    (1) Any X-Rays, CTs or MRIs which have been performed.  Contact the facility where they were done to arrange for  prior to your scheduled appointment.    (2) List of current medications  (3) This referral request   (4) Any documents/labs given to you for this referral                  Future tests that were ordered for you today     Open Future Orders        Priority Expected Expires Ordered    Lipid panel reflex to direct LDL Fasting Routine  4/26/2019 4/26/2018            Who to contact     If you have questions or need follow up information about today's clinic visit or your schedule please contact [facility] directly at [phone].  Normal or non-critical lab and imaging results will be communicated to you by MyChart, letter or phone within 4 business days after the clinic has received the results. If you do not hear from us within 7 days, please contact the  "clinic through [employer] or phone. If you have a critical or abnormal lab result, we will notify you by phone as soon as possible.  Submit refill requests through [employer] [place] [employer] or call your pharmacy and they will forward the refill request to us. Please allow 3 business days for your refill to be completed.          Additional Information About Your Visit        [employer] Information     [employer] [place] [employer] lets you send messages to your doctor, view your test results, renew your prescriptions, schedule appointments and more. To sign up, go to [email or web address] . Click on \"Log in\" on the left side of the screen, which will take you to the Welcome page. Then click on \"Sign up Now\" on the right side of the page.     You will be asked to enter the access code listed below, as well as some personal information. Please follow the directions to create your username and password.     Your access code is: [email or web address]  Expires: 2018  4:11 PM     Your access code will  in 90 days. If you need help or a new code, please call your [facility] clinic or [phone].        Care EveryWhere ID     This is your Care EveryWhere ID. This could be used by other organizations to access your [facility] medical records  [id number]        Your Vitals Were     Pulse Temperature Height Pulse Oximetry BMI (Body Mass Index)       85 98  F (36.7  C) (Oral) 1.829 m (6') 97% 23.46 kg/m2        Blood Pressure from Last 3 Encounters:   18 110/72   18 129/71   16 128/77    Weight from Last 3 Encounters:   18 78.5 kg (173 lb)   18 82.6 kg (182 lb)   16 86.2 kg (190 lb)              We Performed the Following     Albumin Random Urine Quantitative with Creat Ratio     Comprehensive metabolic panel     DIABETES EDUCATOR REFERRAL     FOOT EXAM  NO CHARGE [68193.114]     Hemoglobin A1c     NUTRITION REFERRAL     OPTOMETRY REFERRAL     TSH with free T4 reflex          Today's Medication Changes          These changes are " accurate as of 4/26/18  4:11 PM.  If you have any questions, ask your nurse or doctor.               Start taking these medicines.        Dose/Directions    blood glucose lancets standard   Commonly known as:  no brand specified   Used for:  Type 2 diabetes mellitus with hyperglycemia, without long-term current use of insulin (H)   Started by:  Ly Means PA-C        Use to test blood sugar 4 times daily or as directed.   Quantity:  150 each   Refills:  11       blood glucose monitoring meter device kit   Commonly known as:  no brand specified   Used for:  Type 2 diabetes mellitus with hyperglycemia, without long-term current use of insulin (H)   Started by:  Ly Means PA-C        Use to test blood sugar 4 times daily or as directed.   Quantity:  1 kit   Refills:  0       blood glucose monitoring test strip   Commonly known as:  no brand specified   Used for:  Type 2 diabetes mellitus with hyperglycemia, without long-term current use of insulin (H)   Started by:  Ly Means PA-C        Use to test blood sugars 4 times daily or as directed   Quantity:  150 strip   Refills:  11       insulin glargine 100 UNIT/ML injection   Commonly known as:  LANTUS SOLOSTAR   Used for:  Type 2 diabetes mellitus with hyperglycemia, without long-term current use of insulin (H)   Started by:  Ly Means PA-C        Dose:  14 Units   Inject 14 Units Subcutaneous At Bedtime   Quantity:  15 mL   Refills:  0       metFORMIN 500 MG 24 hr tablet   Commonly known as:  GLUCOPHAGE-XR   Used for:  Type 2 diabetes mellitus with hyperglycemia, without long-term current use of insulin (H)   Started by:  Ly Means PA-C        Take 500mg with evening meal for 7 days then 500mg twice a day for 7 days then 500mg with breakfast and 1000 mg with dinner then 1000 mg twice a day   Quantity:  120 tablet   Refills:  0            Where to get your medicines      These medications were sent to [id number] [place] [address] [address] AT [address] [place], [place] [address]     Phone:  [phone]     blood glucose lancets standard    blood glucose monitoring meter device kit    blood glucose monitoring test strip    insulin glargine 100 UNIT/ML injection         Some of these will need a paper prescription and others can be bought over the counter.  Ask your nurse if you have questions.     Bring a paper prescription for each of these medications     metFORMIN 500 MG 24 hr tablet                Primary Care Provider Office Phone # Fax #    Dinah Chapin Woo [phone] [address] [facility] [address]        Equal Access to Services     IAN MARIN : Hadii jose schuler hadasho Soomaali, waaxda luqadaha, qaybta kaalmada adeegyada, william montgomery. So [facility] [phone].    ATENCIÓN: Si habla español, tiene a savage disposición servicios gratuitos de asistencia lingüística. [facility] [phone].    We comply with applicable federal civil rights laws and [place] laws. We do not discriminate on the basis of race, color, national origin, age, disability, sex, sexual orientation, or gender identity.            Thank you!     Thank you for choosing [facility]  for your care. Our goal is always to provide you with excellent care. Hearing back from our patients is one way we can continue to improve our services. Please take a few minutes to complete the written survey that you may receive in the mail after your visit with us. Thank you!             Your Updated Medication List - Protect others around you: Learn how to safely use, store and throw away your medicines at www.disposemymeds.org.          This list is accurate as of 4/26/18  4:11 PM.  Always use your most recent med list.                   Brand Name Dispense Instructions for use Diagnosis    blood glucose lancets standard    no brand  specified    150 each    Use to test blood sugar 4 times daily or as directed.    Type 2 diabetes mellitus with hyperglycemia, without long-term current use of insulin (H)       blood glucose monitoring meter device kit    no brand specified    1 kit    Use to test blood sugar 4 times daily or as directed.    Type 2 diabetes mellitus with hyperglycemia, without long-term current use of insulin (H)       blood glucose monitoring test strip    no brand specified    150 strip    Use to test blood sugars 4 times daily or as directed    Type 2 diabetes mellitus with hyperglycemia, without long-term current use of insulin (H)       insulin glargine 100 UNIT/ML injection    LANTUS SOLOSTAR    15 mL    Inject 14 Units Subcutaneous At Bedtime    Type 2 diabetes mellitus with hyperglycemia, without long-term current use of insulin (H)       metFORMIN 500 MG 24 hr tablet    GLUCOPHAGE-XR    120 tablet    Take 500mg with evening meal for 7 days then 500mg twice a day for 7 days then 500mg with breakfast and 1000 mg with dinner then 1000 mg twice a day    Type 2 diabetes mellitus with hyperglycemia, without long-term current use of insulin (H)

## 2018-04-26 NOTE — TELEPHONE ENCOUNTER
Call to patient.  No answer. Left message to call back and speak to person on call.   Blood sugar was 569! If starts feeling ill or change in symptoms advise to go to emergency department.

## 2018-04-26 NOTE — TELEPHONE ENCOUNTER
"  FNA Triage Call  Presenting Problem:\"I am returning a call from the clinic regarding my labs.\"   Patient Recommendations/Teaching:Gave message per PA/Epic. Patient denies other sx at this time. Advised ER if sx develop. He is on his way to pharmacy now to  RX. Call back if needed. Patient agrees.  Roselia Harding RN [facility]            "

## 2018-04-27 ENCOUNTER — TELEPHONE (OUTPATIENT)
Dept: EDUCATION SERVICES | Facility: CLINIC | Age: 38
End: 2018-04-27

## 2018-04-27 ENCOUNTER — TELEPHONE (OUTPATIENT)
Dept: FAMILY MEDICINE | Facility: CLINIC | Age: 38
End: 2018-04-27

## 2018-04-27 DIAGNOSIS — E11.65 TYPE 2 DIABETES MELLITUS WITH HYPERGLYCEMIA, WITHOUT LONG-TERM CURRENT USE OF INSULIN (H): ICD-10-CM

## 2018-04-27 LAB — C PEPTIDE SERPL-MCNC: 1.9 NG/ML (ref 0.9–6.9)

## 2018-04-27 ASSESSMENT — ANXIETY QUESTIONNAIRES: GAD7 TOTAL SCORE: 1

## 2018-04-27 NOTE — TELEPHONE ENCOUNTER
ON CALL NOTE:   Clinic on call provider not responding, so I was contacted.     I was contacted by lab about patients glucose of 569.   Last glucose of note was 3 1/2 years ago, which was last time patient saw primary care.   Patient was seen today, provider aware that A1c 13.2.   Insulin and metformin started.    I have called patient, but there is no answer.  As documented by nursing, he is not having symptoms.   Current plan is appropriate based on this information.   No further action taken.

## 2018-04-27 NOTE — TELEPHONE ENCOUNTER
CDE called out to patient.  Education appointments open today at [facility] with latest appointment at 1pm start time.  CDE triage educator is available to meet with patient at the [facility] Monday and Tuesday next week with flexible schedule to do initial education.  Left voicemail on patients phone to call triage line to coordinate appointment today or tomorrow or to call with related questions for phone assistance.  [phone].    Catina Pineda MS, RD, LD, CDE

## 2018-04-27 NOTE — TELEPHONE ENCOUNTER
Reason for Call:  Other prescription    Detailed comments: Pt calling to follow up on medication request for the current Lantus Rx that was recently sent is still too pricey and would like a call back as soon as possible to resolve .      Phone Number Patient can be reached at: Home number on file [phone] (home)    Best Time: anytime    Can we leave a detailed message on this number? YES    Call taken on 4/27/2018 at 3:42 PM by Sony Webster

## 2018-04-27 NOTE — TELEPHONE ENCOUNTER
..Reason for Call:    lantus, too pricey at [facility]    Detailed comments:    [facility] may be less also; please call it into this pharmacy   Phone Number Patient can be reached at: Home number on file [phone] (home)    Best Time: anytime      Can we leave a detailed message on this number? YES    Call taken on 4/27/2018 at 12:47 PM by Jordyn Yi

## 2018-04-30 ENCOUNTER — TELEPHONE (OUTPATIENT)
Dept: FAMILY MEDICINE | Facility: CLINIC | Age: 38
End: 2018-04-30

## 2018-04-30 ENCOUNTER — NURSE TRIAGE (OUTPATIENT)
Dept: NURSING | Facility: CLINIC | Age: 38
End: 2018-04-30

## 2018-04-30 DIAGNOSIS — E11.9 DIABETES MELLITUS, TYPE 2 (H): Primary | ICD-10-CM

## 2018-04-30 NOTE — TELEPHONE ENCOUNTER
The lantus was too experience at [facility] pharmacy  Can that be sent to [facility] [address]    Call patient if questions  [phone]

## 2018-04-30 NOTE — TELEPHONE ENCOUNTER
...Reason for Call:   prescription    Detailed comments: Patient called he said he picked up his prescription but there was no needles, do he get them or do he pay himself,  LEVEMIR  Is what he has.    Phone Number Patient can be reached at: Home number on file [phone] (home)    Best Time: anytime    Can we leave a detailed message on this number? YES    Call taken on 4/30/2018 at 3:44 PM by Teja March

## 2018-04-30 NOTE — TELEPHONE ENCOUNTER
Prescription re-sent, now to [facility] pharmacy in [place]. Same prescription ordered as per original on 4/26/18 by Ly Means.     Rhiannon Saha RN  [facility] Triage

## 2018-05-01 ENCOUNTER — DOCUMENTATION ONLY (OUTPATIENT)
Dept: PHARMACY | Facility: CLINIC | Age: 38
End: 2018-05-01

## 2018-05-01 DIAGNOSIS — E11.65 TYPE 2 DIABETES MELLITUS WITH HYPERGLYCEMIA, WITHOUT LONG-TERM CURRENT USE OF INSULIN (H): Primary | ICD-10-CM

## 2018-05-01 NOTE — TELEPHONE ENCOUNTER
Clinic Action Needed:No  Reason for Call: order for insulin pens    Patient Recommendations/Teaching:n/a  Teaching per [facility] Care guidelines.  Routed to: n/a      Clinic Action Needed: No  Medication Refilled: Yes per RN Refill Guidelines  Additional Notes: new order for insulin needles to accompany new order for insulin  Routed To: n/a       Disp Refills Start End LANE   insulin pen needle (B-D U/F) 31G X 5  each prn 4/30/2018  --   Sig: Use once daily or as directed.   Class: E-Prescribe   Order: [id number]   E-Prescribing Status: Receipt confirmed by pharmacy (4/30/2018  7:46 PM CDT)   Printout Tracking   External Result Report   Medication Administration Instructions   Use once daily or as directed.   Pharmacy   [facility] - [address] AT [facility]   Associated Diagnoses   Diabetes mellitus, type 2 (H) [E11.9]  - Primary         Original order not received by pharmacy   Order placed per protocol essential medication  Vani Howard RN  FNA

## 2018-05-01 NOTE — PROGRESS NOTES
Per email from Kristopher Means, asking for MTM appt asap - referral placed. Unclear if insurance will cover.     Britta Martínez, PharmD, BCACP

## 2018-05-02 NOTE — TELEPHONE ENCOUNTER
Please call and have prescription transferred from [facility] to [facility] on [address]   Then notify patient once completed

## 2018-05-02 NOTE — PROGRESS NOTES
Per PCP request, called pt to f/u on diabetes.    No answer, LVM    Britta Martínez, SuzieD, BCACP

## 2018-05-02 NOTE — TELEPHONE ENCOUNTER
Spoke with [facility] pharmacy MG. [facility] info [facility] [place]

## 2018-05-11 ENCOUNTER — TELEPHONE (OUTPATIENT)
Dept: PHARMACY | Facility: OTHER | Age: 38
End: 2018-05-11

## 2018-05-11 NOTE — LETTER
[facility]  [address] [place] [address]  [phone]    Dear David,      Dr. Woo has recommended you schedule a Medication Therapy Management (MTM) appointment. MTM is designed to help you get the most of out of your medicines.     During an MTM appointment a specially trained pharmacist will review all of your medicines, both prescription and over-the-counter. They will make sure your medicines are the best choice for you and are safe and convenient for you.  MTM pharmacists work together with you and your doctor to help you understand your medicines, solve any problems related to your medicines and help you get the best results from taking your medicines.     At [facility], we strongly believe in a team approach to health care. We want to help you understand your medicines and health conditions. To learn more about how you might benefit from MTM services, watch the patient video at [email or web address].     To make an appointment, please call the clinic at [phone] or the MTM scheduling line at [phone] (toll-free at [phone]).    We look forward to hearing from you!    Sincerely,      Britta Martínez, PharmD, BCACP  Clinical Pharmacy Specialist  Medication Therapy Management Practitioner

## 2018-05-11 NOTE — TELEPHONE ENCOUNTER
MTM referral from: [facility] visit (referral by provider)    MTM referral outreach attempt #2 on May 11, 2018 at 1:35 PM      Outcome: Patient not reachable after several attempts, will route to MTM Pharmacist/Provider as an FYI. Thank you for the referral.    Blanca Middleton, MTM Coordinator

## 2018-05-16 ENCOUNTER — ALLIED HEALTH/NURSE VISIT (OUTPATIENT)
Dept: EDUCATION SERVICES | Facility: CLINIC | Age: 38
End: 2018-05-16
Payer: COMMERCIAL

## 2018-05-16 VITALS — HEIGHT: 73 IN | WEIGHT: 173.6 LBS | BODY MASS INDEX: 23.01 KG/M2

## 2018-05-16 DIAGNOSIS — E11.65 TYPE 2 DIABETES MELLITUS WITH HYPERGLYCEMIA, WITHOUT LONG-TERM CURRENT USE OF INSULIN (H): ICD-10-CM

## 2018-05-16 PROCEDURE — G0108 DIAB MANAGE TRN  PER INDIV: HCPCS

## 2018-05-16 NOTE — PATIENT INSTRUCTIONS
Goals:    1.  Increase Levemir to 18 units in the morning.  Want to take about the same time every day.     2. I will start to carry a source or rapid-acting glucose at all times (car, when walking, at work, by bedside) incase of low blood sugars.  (see p. 14-15 in book for more information).     Tips:     -Try to limit rice to 1 cup at meals to help control carbohydrate intake.     FOLLOW UP: Call in blood sugars weekly until we are seeing more values under 150 mg/dL before meals.    Amalia Marcelo RD, LD, CDE   [phone]

## 2018-05-16 NOTE — MR AVS SNAPSHOT
After Visit Summary   5/16/2018    David Roberts    MRN: [id number]           Patient Information     Date Of Birth          1980        Visit Information        Provider Department      5/16/2018 3:30 PM [facility]        Care Instructions    Goals:    1.  Increase Levemir to 18 units in the morning.  Want to take about the same time every day.     2. I will start to carry a source or rapid-acting glucose at all times (car, when walking, at work, by bedside) incase of low blood sugars.  (see p. 14-15 in book for more information).     Tips:     -Try to limit rice to 1 cup at meals to help control carbohydrate intake.     FOLLOW UP: Call in blood sugars weekly until we are seeing more values under 150 mg/dL before meals.    Amalia Marcelo RD, LD, CDE   [phone]          Follow-ups after your visit        Your next 10 appointments already scheduled     May 17, 2018  3:40 PM CDT   [facility]    [address] [place] [id number] [phone]           Bring a current list of meds and any records pertaining to this visit. For Physicals, please bring immunization records and any forms needing to be filled out. Please arrive 10 minutes early to complete paperwork.              Who to contact     If you have questions or need follow up information about today's clinic visit or your schedule please contact [facility] directly at [phone].  Normal or non-critical lab and imaging results will be communicated to you by MyChart, letter or phone within 4 business days after the clinic has received the results. If you do not hear from us within 7 days, please contact the clinic through MyChart or phone. If you have a critical or abnormal lab result, we will notify you by phone as soon as possible.  Submit  "refill requests through [employer] or call your pharmacy and they will forward the refill request to us. Please allow 3 business days for your refill to be completed.          Additional Information About Your Visit        [employer] Information     [employer] lets you send messages to your doctor, view your test results, renew your prescriptions, schedule appointments and more. To sign up, go to [email or web address] . Click on \"Log in\" on the left side of the screen, which will take you to the Welcome page. Then click on \"Sign up Now\" on the right side of the page.     You will be asked to enter the access code listed below, as well as some personal information. Please follow the directions to create your username and password.     Your access code is: [email or web address]  Expires: 2018  4:11 PM     Your access code will  in 90 days. If you need help or a new code, please call your [place] clinic or [phone].        Care EveryWhere ID     This is your Care EveryWhere ID. This could be used by other organizations to access your [place] medical records  [id number]        Your Vitals Were     Height BMI (Body Mass Index)                1.842 m (6' 0.5\") 23.22 kg/m2           Blood Pressure from Last 3 Encounters:   18 110/72   18 129/71   16 128/77    Weight from Last 3 Encounters:   18 78.7 kg (173 lb 9.6 oz)   18 78.5 kg (173 lb)   18 82.6 kg (182 lb)              Today, you had the following     No orders found for display       Primary Care Provider Office Phone # Fax #    Dinah Chapin Woo [phone] [address] [address]        Equal Access to Services     [facility] : Fito Jo, waaxda luqadaha, qaybta kaalmada sarmad, william montgomery. So [facility] [phone].    ATENCIÓN: Si habla español, tiene a savage disposición servicios gratuitos de asistencia lingüística. Llame al " [phone].    We comply with applicable federal civil rights laws and [place] laws. We do not discriminate on the basis of race, color, national origin, age, disability, sex, sexual orientation, or gender identity.            Thank you!     Thank you for choosing [facility]  for your care. Our goal is always to provide you with excellent care. Hearing back from our patients is one way we can continue to improve our services. Please take a few minutes to complete the written survey that you may receive in the mail after your visit with us. Thank you!             Your Updated Medication List - Protect others around you: Learn how to safely use, store and throw away your medicines at www.disposemymeds.org.          This list is accurate as of 5/16/18  4:27 PM.  Always use your most recent med list.                   Brand Name Dispense Instructions for use Diagnosis    blood glucose lancets standard    no brand specified    150 each    Use to test blood sugar 4 times daily or as directed.    Type 2 diabetes mellitus with hyperglycemia, without long-term current use of insulin (H)       blood glucose monitoring meter device kit    no brand specified    1 kit    Use to test blood sugar 4 times daily or as directed.    Type 2 diabetes mellitus with hyperglycemia, without long-term current use of insulin (H)       blood glucose monitoring test strip    no brand specified    150 strip    Use to test blood sugars 4 times daily or as directed    Type 2 diabetes mellitus with hyperglycemia, without long-term current use of insulin (H)       insulin detemir 100 UNIT/ML injection    LEVEMIR FLEXPEN/FLEXTOUCH    3 mL    Inject 10 Units Subcutaneous At Bedtime    Type 2 diabetes mellitus with hyperglycemia, without long-term current use of insulin (H)       * insulin pen needle 31G X 6 MM     100 each    Use once daily or as directed.    Type 2 diabetes mellitus with hyperglycemia, without  long-term current use of insulin (H)       * insulin pen needle 31G X 5 MM    B-D U/F    100 each    Use once daily or as directed.    Diabetes mellitus, type 2 (H)       metFORMIN 500 MG 24 hr tablet    GLUCOPHAGE-XR    120 tablet    Take 500mg with evening meal for 7 days then 500mg twice a day for 7 days then 500mg with breakfast and 1000 mg with dinner then 1000 mg twice a day    Type 2 diabetes mellitus with hyperglycemia, without long-term current use of insulin (H)       * Notice:  This list has 2 medication(s) that are the same as other medications prescribed for you. Read the directions carefully, and ask your doctor or other care provider to review them with you.

## 2018-05-16 NOTE — PROGRESS NOTES
"Diabetes Self Management Training: Individual Review Visit    David Roberts presents today for education and evaluation of glucose control related to Type 2 diabetes.    He is accompanied by self    Patient's diabetes management related comments/concerns: Says blood glucose has been high -possible 200-300's and denies any blood glucose <300 mg/dL.     Says having some constipation- found warm milk helps sometimes.       Patient's emotional response to diabetes: expresses readiness to learn and frustration - \"who wants to be sick\".  Says was using the bathroom frequently and very thirsty. Now this has improved.  Blurred vision cleared up since starting the insulin.     Patient would like this visit to be focused around the following diabetes-related behaviors and goals: Assistance with making lifestyle changes    ASSESSMENT:  Patient Problem List and Family Medical History reviewed for relevant medical history, current medical status, and diabetes risk factors.    Current Diabetes Management per Patient:  Taking diabetes medications?   yes:     Diabetes Medication(s)     Biguanides Sig    metFORMIN (GLUCOPHAGE-XR) 500 MG 24 hr tablet Take 500mg with evening meal for 7 days then 500mg twice a day for 7 days then 500mg with breakfast and 1000 mg with dinner then 1000 mg twice a day    Insulin Sig    insulin detemir (LEVEMIR FLEXPEN/FLEXTOUCH) 100 UNIT/ML injection Inject 10 Units Subcutaneous At Bedtime      , Problems taking diabetes medications regularly? No- Metformin is 1 tablet AM and 1 tablet PM.  Has been on this since last Monday. Side effects? Yes - constipation     *Abbreviated insulin dose documentation key: Insulin Trade Name (fdmlasali-ceoup-ssrbhn-bedtime) - i.e. Humalog 5-5-5-0 (Humalog 5 units at breakfast, 5 units at lunch, and 5 units at dinner).    Past Diabetes Education: Yes    Patient glucose self monitoring as follows: one time daily before meals.    BG meter: Unsure of meter  BG results: not " "available - not bring meter or blood glucose along today    BG values are: unable to assess  Patient's most recent   Lab Results   Component Value Date    A1C 13.2 04/26/2018    is not meeting goal of <7.0    Nutrition:  Patient skips lunch regularly    Wakes: 6:15am  Breakfast - 9am: dark chocolate and 1 cup milk   Lunch - 10-11am: 2 cups rice   PM: 1 cup milk   Dinner - 8-10pm: 2 cups rice  OR soup with veggies and \"a lot\" of watermelon   Snacks - none    Beverages: Milk 1-2 cups/day, Coffee 2 cups black/day and Water all day    Cultural/Pentecostal diet restrictions: No     Biggest Challenge to Healthy Eating: not feeling hungry until 9am     Physical Activity:    Job is physical- standing and moving all day.     Diabetes Risk Factors:  ethnicity    Diabetes Complications:  Acute Complications: At risk for hypoglycemia? yes  Symptoms of low blood sugar? shaking  Frequency of hypoglycemia: rare  Patient carries a carbohydrate source with them regularly: No   Symptoms of hyperglycemia? increased thirst, frequent urination and blurred vision    Vitals:  There were no vitals taken for this visit.  Estimated body mass index is 23.46 kg/(m^2) as calculated from the following:    Height as of 4/26/18: 1.829 m (6').    Weight as of 4/26/18: 78.5 kg (173 lb).   Last 3 BP:   BP Readings from Last 3 Encounters:   04/26/18 110/72   01/19/18 129/71   07/26/16 128/77       History   Smoking Status     Never Smoker   Smokeless Tobacco     Never Used       Labs:  Lab Results   Component Value Date    A1C 13.2 04/26/2018     Lab Results   Component Value Date     04/26/2018     Lab Results   Component Value Date     07/26/2016     HDL Cholesterol   Date Value Ref Range Status   07/26/2016 38 (L) >39 mg/dL Final   ]  GFR Estimate   Date Value Ref Range Status   04/26/2018 66 >60 mL/min/1.7m2 Corrected     Comment:     CORRECTED ON 04/26 AT 1932: PREVIOUSLY REPORTED AS 66 Non  GFR   Calc       GFR " Estimate If Black   Date Value Ref Range Status   04/26/2018 80 >60 mL/min/1.7m2 Corrected     Comment:     CORRECTED ON 04/26 AT 1932: PREVIOUSLY REPORTED AS 80  GFR   Calc       Lab Results   Component Value Date    CR 1.23 04/26/2018     No results found for: MICROALBUMIN    Socio/Economic Considerations:    Support system: spouse/significant other    Health Beliefs and Attitudes:   Patient Activation Measure Survey Score:  CHACORTA Score (Last Two) 5/18/2012   CHACORTA Raw Score 47   Activation Score 77.5   CHACORTA Level 4       Stage of Change: ACTION (Actively working towards change)      Diabetes knowledge and skills assessment:     Patient is knowledgeable in diabetes management concepts related to: Healthy Eating, Being Active, Monitoring, Taking Medication, Problem Solving, Reducing Risks and Healthy Coping    Patient needs further education on the following diabetes management concepts: Healthy Eating, Taking Medication and Reducing Risks    Barriers to Learning Assessment: No Barriers identified    Based on learning assessment above, most appropriate setting for further diabetes education would be: Group class or Individual setting.    INTERVENTION:     Insulin administration technique reviewed using a Pen for Levemir. Patient verbalized understanding and was able to perform an accurate return demonstration of administration technique. He was not priming or holding injection for 10 seconds so this was encouraged.  Reviewed site selection, storage and sharps.  Sounds like patient has not had any blood glucose <200 mg/dL since insulin started so recommended he increase to 18 units (8 unit or 0.1 U/kg CBW increase).  He will start to take in the morning since taking at bedtime after work may vary more than 1 hour and this will be more consistent for him.  He is agreeable to this suggestion.    Reviewed physiology of diabetes and making less insulin over time.  Reviewed hypoglycemia safety and patient  encouraged to carry source rapid-acting glucose at all times.  He was advised to get comprehensive eye exam since more than 1 year.      Reviewed diet recall and recommended he limit rice to 1 cup and include more non-starchy veggies/protein to see if this helps.  Informed him general recommendations for males of 60-75 grams carbs at meals and limiting snacks to 15 grams.  Reviewed target blood glucose values and advised patient to reach out by phone weekly with blood glucose values until seeing more blood glucose <130 mg/dL before meals.  Patient wanted to cancel appointment at  on 6/7/18.  Pt verbalized understanding of concepts discussed and recommendations provided.         Education provided today on:  AADE Self-Care Behaviors:  Healthy Eating: carbohydrate counting, consistency in amount, composition, and timing of food intake and portion control  Being Active: relationship to blood glucose, describe appropriate activity program and precautions to take  Monitoring: purpose, log and interpret results, individual blood glucose targets, frequency of monitoring and proper sharps disposal  Taking Medication: action of prescribed medication, drawing up, administering and storing injectable diabetes medications, proper site selection and rotation for injections, side effects of prescribed medications and when to take medications  Problem Solving: high blood glucose - causes, signs/symptoms, treatment and prevention, low blood glucose - causes, signs/symptoms, treatment and prevention, carrying a carbohydrate source at all times and when to call health care provider  Reducing Risks: annual eye exam    Opportunities for ongoing education and support in diabetes-self management were discussed.    Pt verbalized understanding of concepts discussed and recommendations provided today.       Education Materials Provided:  Power Understanding Diabetes Booklet, Safe Disposal Options for Needles & Syringes and BG Log  Sheet   and Vehicle Services: Just the Facts - Medical Conditions and your 's License  [place] Department of Public Safety: Insulin-Treated Diabetes Mellitus Report      PLAN:  See Patient Instructions for co-developed, patient-stated behavior change goals.  AVS printed and provided to patient today.    FOLLOW-UP:  Follow-up with PCP recommended.  Follow-up planned for BG review by phone in 1 week.     Ongoing plan for education and support: Follow-up with primary care provider    Amalia Marcelo RD, SEFERINO, CDE   Time Spent: 60 minutes  Encounter Type: Individual    Any diabetes medication dose changes were made via the CDE Protocol and Collaborative Practice Agreement with the patient's referring provider. A copy of this encounter was shared with the provider.

## 2018-05-16 NOTE — Clinical Note
Alejandro Modi,  Just MARY- sounds like David continues to see all blood glucose >200 mg/dL -he didn't bring in values today but denies any values below this.  Recommended he increase Levemir to 18 units (0.1 u/kg CBW since >75% blood glucose >200 mg/dL).  Thanks, Amalia Marcelo RD, LD, CDE

## 2018-05-23 ENCOUNTER — TELEPHONE (OUTPATIENT)
Dept: EDUCATION SERVICES | Facility: CLINIC | Age: 38
End: 2018-05-23

## 2018-05-23 NOTE — TELEPHONE ENCOUNTER
Diabetes Follow-up    Subjective/Objective:    Reached out to [facility] to review blood glucose values since last insulin adjustment on 5/16/18:     Diabetes is being managed with   yes:     Diabetes Medication(s)     Biguanides Sig    metFORMIN (GLUCOPHAGE-XR) 500 MG 24 hr tablet Take 500mg with evening meal for 7 days then 500mg twice a day for 7 days then 500mg with breakfast and 1000 mg with dinner then 1000 mg twice a day    Insulin Sig    insulin detemir (LEVEMIR FLEXPEN/FLEXTOUCH) 100 UNIT/ML injection Inject 18 Units Subcutaneous every morning Take at same time each day.          Left message for [place] that looking to follow up on his values from last week.  If still seeing a lot of high results since medication change, requested a call back and that he leave his values and time checked and can adjust medication again as needed.  Call back number provided.    Amalia Marcelo RD, LD, CDE

## 2018-05-30 ENCOUNTER — OFFICE VISIT (OUTPATIENT)
Dept: FAMILY MEDICINE | Facility: CLINIC | Age: 38
End: 2018-05-30
Payer: COMMERCIAL

## 2018-05-30 VITALS
DIASTOLIC BLOOD PRESSURE: 70 MMHG | TEMPERATURE: 98.2 F | SYSTOLIC BLOOD PRESSURE: 110 MMHG | OXYGEN SATURATION: 99 % | HEART RATE: 73 BPM | HEIGHT: 77 IN | RESPIRATION RATE: 16 BRPM | WEIGHT: 174 LBS | BODY MASS INDEX: 20.55 KG/M2

## 2018-05-30 DIAGNOSIS — Z13.6 CARDIOVASCULAR SCREENING; LDL GOAL LESS THAN 100: Primary | ICD-10-CM

## 2018-05-30 DIAGNOSIS — Z79.4 TYPE 2 DIABETES MELLITUS WITH HYPERGLYCEMIA, WITH LONG-TERM CURRENT USE OF INSULIN (H): ICD-10-CM

## 2018-05-30 DIAGNOSIS — E11.65 TYPE 2 DIABETES MELLITUS WITH HYPERGLYCEMIA, WITH LONG-TERM CURRENT USE OF INSULIN (H): ICD-10-CM

## 2018-05-30 PROCEDURE — 82043 UR ALBUMIN QUANTITATIVE: CPT | Performed by: PHYSICIAN ASSISTANT

## 2018-05-30 PROCEDURE — 80053 COMPREHEN METABOLIC PANEL: CPT | Performed by: PHYSICIAN ASSISTANT

## 2018-05-30 PROCEDURE — 80061 LIPID PANEL: CPT | Performed by: PHYSICIAN ASSISTANT

## 2018-05-30 PROCEDURE — 36415 COLL VENOUS BLD VENIPUNCTURE: CPT | Performed by: PHYSICIAN ASSISTANT

## 2018-05-30 PROCEDURE — 99214 OFFICE O/P EST MOD 30 MIN: CPT | Performed by: PHYSICIAN ASSISTANT

## 2018-05-30 RX ORDER — LISINOPRIL 2.5 MG/1
2.5 TABLET ORAL DAILY
Qty: 30 TABLET | Refills: 1 | Status: SHIPPED | OUTPATIENT
Start: 2018-05-30 | End: 2018-08-10

## 2018-05-30 RX ORDER — ATORVASTATIN CALCIUM 10 MG/1
10 TABLET, FILM COATED ORAL DAILY
Qty: 30 TABLET | Refills: 1 | Status: SHIPPED | OUTPATIENT
Start: 2018-05-30 | End: 2018-08-10

## 2018-05-30 RX ORDER — METFORMIN HCL 500 MG
1000 TABLET, EXTENDED RELEASE 24 HR ORAL 2 TIMES DAILY WITH MEALS
Qty: 120 TABLET | Refills: 1 | Status: SHIPPED | OUTPATIENT
Start: 2018-05-30 | End: 2018-05-30

## 2018-05-30 RX ORDER — METFORMIN HCL 500 MG
1000 TABLET, EXTENDED RELEASE 24 HR ORAL 2 TIMES DAILY WITH MEALS
Qty: 120 TABLET | Refills: 1 | Status: SHIPPED | OUTPATIENT
Start: 2018-05-30 | End: 2018-08-29

## 2018-05-30 ASSESSMENT — PAIN SCALES - GENERAL: PAINLEVEL: NO PAIN (0)

## 2018-05-30 NOTE — MR AVS SNAPSHOT
After Visit Summary   5/30/2018    David Roberst    MRN: [id number]           Patient Information     Date Of Birth          1980        Visit Information        Provider Department      5/30/2018 4:00 PM Ly Means PA-C [facility]        Today's Diagnoses     CARDIOVASCULAR SCREENING; LDL GOAL LESS THAN 100    -  1    Type 2 diabetes mellitus with hyperglycemia, with long-term current use of insulin (H)        Type 2 diabetes mellitus with hyperglycemia, without long-term current use of insulin (H)          Care Instructions    Start lisinopril 2.5 mg daily and follow up with us in one month.   Start lipitor 10 mg daily and follow up with us in one month.   Call with side effects like muscle aches or pains  Continue insulin 18 units in AM  I encourage you to check blood sugar at least twice a day and call diabetes educator with readings  I encourage you to meet with nutritionist at [facility] ([phone]) to work on diet   Schedule eye exam at earliest convenience.   Follow up with us in one month.  You do not need to fast           Follow-ups after your visit        Who to contact     If you have questions or need follow up information about today's clinic visit or your schedule please contact [facility] directly at [phone].  Normal or non-critical lab and imaging results will be communicated to you by MyChart, letter or phone within 4 business days after the clinic has received the results. If you do not hear from us within 7 days, please contact the clinic through MyChart or phone. If you have a critical or abnormal lab result, we will notify you by phone as soon as possible.  Submit refill requests through [facility] or call your pharmacy and they will forward the refill request to us. Please allow 3 business days for your refill to be completed.          Additional Information About Your Visit       "  MyChart Information     [facility] lets you send messages to your doctor, view your test results, renew your prescriptions, schedule appointments and more. To sign up, go to [email or web address] . Click on \"Log in\" on the left side of the screen, which will take you to the Welcome page. Then click on \"Sign up Now\" on the right side of the page.     You will be asked to enter the access code listed below, as well as some personal information. Please follow the directions to create your username and password.     Your access code is: [email or web address]  Expires: 2018  4:11 PM     Your access code will  in 90 days. If you need help or a new code, please call your [facility] clinic or [phone].        Care EveryWhere ID     This is your Care EveryWhere ID. This could be used by other organizations to access your [facility] medical records  [id number]        Your Vitals Were     Pulse Temperature Respirations Height Pulse Oximetry BMI (Body Mass Index)    73 98.2  F (36.8  C) (Oral) 16 1.956 m (6' 5\") 99% 20.63 kg/m2       Blood Pressure from Last 3 Encounters:   18 110/70   18 110/72   18 129/71    Weight from Last 3 Encounters:   18 78.9 kg (174 lb)   18 78.7 kg (173 lb 9.6 oz)   18 78.5 kg (173 lb)              We Performed the Following     Albumin Random Urine Quantitative with Creat Ratio     Lipid panel reflex to direct LDL Fasting          Today's Medication Changes          These changes are accurate as of 18  4:39 PM.  If you have any questions, ask your nurse or doctor.               Start taking these medicines.        Dose/Directions    atorvastatin 10 MG tablet   Commonly known as:  LIPITOR   Used for:  Type 2 diabetes mellitus with hyperglycemia, with long-term current use of insulin (H)   Started by:  Ly Means PA-C        Dose:  10 mg   Take 1 tablet (10 mg) by mouth daily   Quantity:  30 tablet   Refills:  1       lisinopril 2.5 MG tablet "   Commonly known as:  PRINIVIL/Zestril   Used for:  Type 2 diabetes mellitus with hyperglycemia, with long-term current use of insulin (H)   Started by:  Ly Means PA-C        Dose:  2.5 mg   Take 1 tablet (2.5 mg) by mouth daily   Quantity:  30 tablet   Refills:  1       metFORMIN 500 MG 24 hr tablet   Commonly known as:  GLUCOPHAGE-XR   Used for:  Type 2 diabetes mellitus with hyperglycemia, with long-term current use of insulin (H)   Started by:  Ly Means PA-C        Dose:  1000 mg   Take 2 tablets (1,000 mg) by mouth 2 times daily (with meals)   Quantity:  120 tablet   Refills:  1            Where to get your medicines      These medications were sent to [facility] [id number] [place] [address] [address] AT [address], [place] [address]     Phone:  [phone]     atorvastatin 10 MG tablet    insulin detemir 100 UNIT/ML injection    lisinopril 2.5 MG tablet    metFORMIN 500 MG 24 hr tablet                Primary Care Provider Office Phone # Fax #    Idnah Chapin Woo [phone] [address] [facility] [address]        Equal Access to Services     IAN MARIN AH: Hadii jose ku hadasho Soomaali, waaxda luqadaha, qaybta kaalmada adeegyada, waxay daríoin haysandyn shavon montgomery. So [facility] [phone].    ATENCIÓN: Si habla español, tiene a savage disposición servicios gratuitos de asistencia lingüística. Llame al [phone].    We comply with applicable federal civil rights laws and [place] laws. We do not discriminate on the basis of race, color, national origin, age, disability, sex, sexual orientation, or gender identity.            Thank you!     Thank you for choosing [facility]  for your care. Our goal is always to provide you with excellent care. Hearing back from our patients is one way we can continue to improve our services. Please take a few minutes to complete the written  survey that you may receive in the mail after your visit with us. Thank you!             Your Updated Medication List - Protect others around you: Learn how to safely use, store and throw away your medicines at www.disposemymeds.org.          This list is accurate as of 5/30/18  4:39 PM.  Always use your most recent med list.                   Brand Name Dispense Instructions for use Diagnosis    atorvastatin 10 MG tablet    LIPITOR    30 tablet    Take 1 tablet (10 mg) by mouth daily    Type 2 diabetes mellitus with hyperglycemia, with long-term current use of insulin (H)       blood glucose lancets standard    no brand specified    150 each    Use to test blood sugar 4 times daily or as directed.    Type 2 diabetes mellitus with hyperglycemia, without long-term current use of insulin (H)       blood glucose monitoring meter device kit    no brand specified    1 kit    Use to test blood sugar 4 times daily or as directed.    Type 2 diabetes mellitus with hyperglycemia, without long-term current use of insulin (H)       blood glucose monitoring test strip    no brand specified    150 strip    Use to test blood sugars 4 times daily or as directed    Type 2 diabetes mellitus with hyperglycemia, without long-term current use of insulin (H)       insulin detemir 100 UNIT/ML injection    LEVEMIR FLEXPEN/FLEXTOUCH    15 mL    Inject 18 Units Subcutaneous every morning Take at same time each day.    Type 2 diabetes mellitus with hyperglycemia, without long-term current use of insulin (H)       * insulin pen needle 31G X 6 MM     100 each    Use once daily or as directed.    Type 2 diabetes mellitus with hyperglycemia, without long-term current use of insulin (H)       * insulin pen needle 31G X 5 MM    B-D U/F    100 each    Use once daily or as directed.    Diabetes mellitus, type 2 (H)       lisinopril 2.5 MG tablet    PRINIVIL/Zestril    30 tablet    Take 1 tablet (2.5 mg) by mouth daily    Type 2 diabetes mellitus with  hyperglycemia, with long-term current use of insulin (H)       metFORMIN 500 MG 24 hr tablet    GLUCOPHAGE-XR    120 tablet    Take 2 tablets (1,000 mg) by mouth 2 times daily (with meals)    Type 2 diabetes mellitus with hyperglycemia, with long-term current use of insulin (H)       * Notice:  This list has 2 medication(s) that are the same as other medications prescribed for you. Read the directions carefully, and ask your doctor or other care provider to review them with you.

## 2018-05-30 NOTE — PATIENT INSTRUCTIONS
Start lisinopril 2.5 mg daily and follow up with us in one month.   Start lipitor 10 mg daily and follow up with us in one month.   Call with side effects like muscle aches or pains  Continue insulin 18 units in AM  I encourage you to check blood sugar at least twice a day and call diabetes educator with readings  I encourage you to meet with nutritionist at [facility] ([phone]) to work on diet   Schedule eye exam at earliest convenience.   Follow up with us in one month.  You do not need to fast

## 2018-05-30 NOTE — PROGRESS NOTES
SUBJECTIVE:   David Roberts is a 38 year old male who presents to clinic today for the following health issues:    Diabetes Follow-up    Patient is checking blood sugars: sometimes    Diabetic concerns: None     Symptoms of hypoglycemia (low blood sugar): none     Paresthesias (numbness or burning in feet) or sores: No     Date of last diabetic eye exam: Yes    BP Readings from Last 2 Encounters:   04/26/18 110/72   01/19/18 129/71     Hemoglobin A1C (%)   Date Value   04/26/2018 13.2 (H)   07/26/2016 6.8 (H)     LDL Cholesterol Calculated (mg/dL)   Date Value   07/26/2016 111 (H)   08/19/2013 99     LDL Cholesterol Direct (mg/dL)   Date Value   11/12/2014 81       Amount of exercise or physical activity: None    Problems taking medications regularly: No    Medication side effects: none    Diet: regular (no restrictions)    Eye exam- on [facility] in [place]- done in 2016- has had vision changes and called to schedule appointment but encouraged to check with insurance since insurance wouldn't cover clinic he was going to go to     Eats everything- most of waht I eat is rice- one time a day eating rice- 1 cup at meal.   Maybe 1 1/2 cup depends on how hungry I am  Doesn't eat breakfast.   Has had a fasting blood sugar of 116 but also has had blood sugar of 270 something.  Not checking regularly.    18 units of insulin in AM.   Doesn't want to make adjustments in insulin today.    Urinating less and less thirsty      Problem list and histories reviewed & adjusted, as indicated.  Additional history: as documented    Patient Active Problem List   Diagnosis     Back pain, chronic     CARDIOVASCULAR SCREENING; LDL GOAL LESS THAN 100     S/P vasectomy     Type 2 diabetes mellitus without complication (H)     Dyshidrotic eczema     Type 2 diabetes mellitus with hyperglycemia, with long-term current use of insulin (H)     Past Surgical History:   Procedure Laterality Date     HERNIA REPAIR, INGUINAL RT/LT  94,97      VASECTOMY  2007       Social History   Substance Use Topics     Smoking status: Never Smoker     Smokeless tobacco: Never Used     Alcohol use Yes      Comment: rarely     Family History   Problem Relation Age of Onset     DIABETES Mother      Leukemia Son 3     Hypertension No family hx of      Thyroid Disease No family hx of      Glaucoma No family hx of      Macular Degeneration No family hx of      CEREBROVASCULAR DISEASE No family hx of      C.A.D. No family hx of          Current Outpatient Prescriptions   Medication Sig Dispense Refill     atorvastatin (LIPITOR) 10 MG tablet Take 1 tablet (10 mg) by mouth daily 30 tablet 1     blood glucose (NO BRAND SPECIFIED) lancets standard Use to test blood sugar 4 times daily or as directed. 150 each 11     blood glucose monitoring (NO BRAND SPECIFIED) meter device kit Use to test blood sugar 4 times daily or as directed. 1 kit 0     blood glucose monitoring (NO BRAND SPECIFIED) test strip Use to test blood sugars 4 times daily or as directed 150 strip 11     insulin detemir (LEVEMIR FLEXPEN/FLEXTOUCH) 100 UNIT/ML injection Inject 18 Units Subcutaneous every morning Take at same time each day. 15 mL 0     insulin pen needle (B-D U/F) 31G X 5 MM Use once daily or as directed. 100 each prn     insulin pen needle 31G X 6 MM Use once daily or as directed. 100 each prn     lisinopril (PRINIVIL/ZESTRIL) 2.5 MG tablet Take 1 tablet (2.5 mg) by mouth daily 30 tablet 1     metFORMIN (GLUCOPHAGE-XR) 500 MG 24 hr tablet Take 2 tablets (1,000 mg) by mouth 2 times daily (with meals) 120 tablet 1     Allergies   Allergen Reactions     Chloroquine      Other [Seasonal Allergies]      NEVAQUINE       Reviewed and updated as needed this visit by clinical staff  Tobacco  Allergies  Meds  Med Hx  Surg Hx  Fam Hx  Soc Hx      Reviewed and updated as needed this visit by Provider  Tobacco  Allergies  Meds  Problems  Med Hx  Surg Hx  Fam Hx  Soc Hx          ROS:  Constitutional,  "HEENT, cardiovascular, pulmonary, gi and gu systems are negative, except as otherwise noted.    OBJECTIVE:     /70 (BP Location: Right arm, Patient Position: Chair, Cuff Size: Adult Regular)  Pulse 73  Temp 98.2  F (36.8  C) (Oral)  Resp 16  Ht 1.956 m (6' 5\")  Wt 78.9 kg (174 lb)  SpO2 99%  BMI 20.63 kg/m2  Body mass index is 20.63 kg/(m^2).  GENERAL: healthy, alert and no distress  NECK: no adenopathy, no asymmetry, masses, or scars and thyroid normal to palpation  RESP: lungs clear to auscultation - no rales, rhonchi or wheezes  CV: regular rate and rhythm, normal S1 S2, no S3 or S4, no murmur, click or rub, no peripheral edema and peripheral pulses strong  ABDOMEN: soft, nontender, no hepatosplenomegaly, no masses and bowel sounds normal  MS: no gross musculoskeletal defects noted, no edema  PSYCH: mentation appears normal, affect flat, judgement and insight intact and appearance well groomed    Diagnostic Test Results:  Results for orders placed or performed in visit on 05/30/18   Albumin Random Urine Quantitative with Creat Ratio   Result Value Ref Range    Creatinine Urine 293 mg/dL    Albumin Urine mg/L 46 mg/L    Albumin Urine mg/g Cr 15.77 0 - 17 mg/g Cr   Lipid panel reflex to direct LDL Fasting   Result Value Ref Range    Cholesterol 153 <200 mg/dL    Triglycerides 110 <150 mg/dL    HDL Cholesterol 41 >39 mg/dL    LDL Cholesterol Calculated 90 <100 mg/dL    Non HDL Cholesterol 112 <130 mg/dL   Comprehensive metabolic panel (BMP + Alb, Alk Phos, ALT, AST, Total. Bili, TP)   Result Value Ref Range    Sodium 141 133 - 144 mmol/L    Potassium 3.7 3.4 - 5.3 mmol/L    Chloride 104 94 - 109 mmol/L    Carbon Dioxide 29 20 - 32 mmol/L    Anion Gap PENDING 3 - 14 mmol/L    Glucose 109 (H) 70 - 99 mg/dL    Urea Nitrogen 11 7 - 30 mg/dL    Creatinine 1.01 0.66 - 1.25 mg/dL    GFR Estimate PENDING >60 mL/min/1.7m2    GFR Estimate If Black PENDING >60 mL/min/1.7m2    Calcium 8.9 8.5 - 10.1 mg/dL    " Bilirubin Total 0.5 0.2 - 1.3 mg/dL    Albumin 3.9 3.4 - 5.0 g/dL    Protein Total 6.9 6.8 - 8.8 g/dL    Alkaline Phosphatase 72 40 - 150 U/L    ALT 60 0 - 70 U/L    AST 24 0 - 45 U/L       ASSESSMENT/PLAN:             1. Type 2 diabetes mellitus with hyperglycemia, with long-term current use of insulin (H)  Since has had blood sugar of 116 not sure I want to increase long acting insulin.  Fili discussion with patient that it is difficult to know what to do with medications since no blood sugars to review.  Encouraged to meet with nutritionist and reach out to diabetes educator with blood sugars for further recommendations.   Will start lisinopril to protect the kidneys (blood pressure in normal range)- side effects discussed   Will start lipitor -side effects discussed   Was fasting today and lipids and liver function normal   Has had blurring of vision- advised needs follow up with opthamology as soon as possible     - lisinopril (PRINIVIL/ZESTRIL) 2.5 MG tablet; Take 1 tablet (2.5 mg) by mouth daily  Dispense: 30 tablet; Refill: 1  - atorvastatin (LIPITOR) 10 MG tablet; Take 1 tablet (10 mg) by mouth daily  Dispense: 30 tablet; Refill: 1  - Albumin Random Urine Quantitative with Creat Ratio  - Lipid panel reflex to direct LDL Fasting  - insulin detemir (LEVEMIR FLEXPEN/FLEXTOUCH) 100 UNIT/ML injection; Inject 18 Units Subcutaneous every morning Take at same time each day.  Dispense: 15 mL; Refill: 0  - metFORMIN (GLUCOPHAGE-XR) 500 MG 24 hr tablet; Take 2 tablets (1,000 mg) by mouth 2 times daily (with meals)  Dispense: 120 tablet; Refill: 1  - Comprehensive metabolic panel (BMP + Alb, Alk Phos, ALT, AST, Total. Bili, TP)    2. CARDIOVASCULAR SCREENING; LDL GOAL LESS THAN 100    - Lipid panel reflex to direct LDL Fasting    Patient Instructions   Start lisinopril 2.5 mg daily and follow up with us in one month.   Start lipitor 10 mg daily and follow up with us in one month.   Call with side effects like muscle  aches or pains  Continue insulin 18 units in AM  I encourage you to check blood sugar at least twice a day and call diabetes educator with readings  I encourage you to meet with nutritionist at [facility] ([phone]) to work on diet   Schedule eye exam at earliest convenience.   Follow up with us in one month.  You do not need to fast      CC chart to PCP for panchito Means PA-C  [facility]

## 2018-05-30 NOTE — LETTER
June 1, 2018      David Roberts  [address]  [place] [address]        Dear David,     Your electrolytes, kidney function, and liver function were normal.   Your blood sugar was 109. This is in the normal range but We may need to back off of the insulin a little bit. Please try to check your blood sugars at least twice a day and get in touch with diabetes educator to adjust your insulin.  Also let us know if you are experiencing lows or lightheadedness or shakiness and check your blood sugars at that time.   Please call or MyChart my office with any questions or concerns.         Sincerely,        Ly Means PA-C

## 2018-05-31 DIAGNOSIS — E11.65 TYPE 2 DIABETES MELLITUS WITH HYPERGLYCEMIA, WITH LONG-TERM CURRENT USE OF INSULIN (H): Primary | ICD-10-CM

## 2018-05-31 DIAGNOSIS — Z79.4 TYPE 2 DIABETES MELLITUS WITH HYPERGLYCEMIA, WITH LONG-TERM CURRENT USE OF INSULIN (H): Primary | ICD-10-CM

## 2018-05-31 LAB
ALBUMIN SERPL-MCNC: 3.9 G/DL (ref 3.4–5)
ALP SERPL-CCNC: 72 U/L (ref 40–150)
ALT SERPL W P-5'-P-CCNC: 60 U/L (ref 0–70)
ANION GAP SERPL CALCULATED.3IONS-SCNC: 8 MMOL/L (ref 3–14)
AST SERPL W P-5'-P-CCNC: 24 U/L (ref 0–45)
BILIRUB SERPL-MCNC: 0.5 MG/DL (ref 0.2–1.3)
BUN SERPL-MCNC: 11 MG/DL (ref 7–30)
CALCIUM SERPL-MCNC: 8.9 MG/DL (ref 8.5–10.1)
CHLORIDE SERPL-SCNC: 104 MMOL/L (ref 94–109)
CHOLEST SERPL-MCNC: 153 MG/DL
CO2 SERPL-SCNC: 29 MMOL/L (ref 20–32)
CREAT SERPL-MCNC: 1.01 MG/DL (ref 0.66–1.25)
CREAT UR-MCNC: 293 MG/DL
GFR SERPL CREATININE-BSD FRML MDRD: 83 ML/MIN/1.7M2
GLUCOSE SERPL-MCNC: 109 MG/DL (ref 70–99)
HDLC SERPL-MCNC: 41 MG/DL
LDLC SERPL CALC-MCNC: 90 MG/DL
MICROALBUMIN UR-MCNC: 46 MG/L
MICROALBUMIN/CREAT UR: 15.77 MG/G CR (ref 0–17)
NONHDLC SERPL-MCNC: 112 MG/DL
POTASSIUM SERPL-SCNC: 3.7 MMOL/L (ref 3.4–5.3)
PROT SERPL-MCNC: 6.9 G/DL (ref 6.8–8.8)
SODIUM SERPL-SCNC: 141 MMOL/L (ref 133–144)
TRIGL SERPL-MCNC: 110 MG/DL

## 2018-06-01 NOTE — PROGRESS NOTES
Please call and advise - also sent letter    Your electrolytes, kidney function, and liver function were normal.  Your blood sugar was 109.  This is in the normal range but We may need to back off of the insulin a little bit.  Please try to check your blood sugars at least twice a day and get in touch with diabetes educator to adjust your insulin.  Also let us know if you are experiencing lows or lightheadedness or shakiness and check your blood sugars at that time.   Please call or MyChart my office with any questions or concerns.

## 2018-06-21 ENCOUNTER — PRE VISIT (OUTPATIENT)
Dept: UROLOGY | Facility: CLINIC | Age: 38
End: 2018-06-21

## 2018-07-05 ENCOUNTER — OFFICE VISIT (OUTPATIENT)
Dept: UROLOGY | Facility: CLINIC | Age: 38
End: 2018-07-05
Payer: COMMERCIAL

## 2018-07-05 VITALS
HEART RATE: 78 BPM | HEIGHT: 72 IN | SYSTOLIC BLOOD PRESSURE: 138 MMHG | DIASTOLIC BLOOD PRESSURE: 82 MMHG | BODY MASS INDEX: 23.57 KG/M2 | WEIGHT: 174 LBS

## 2018-07-05 DIAGNOSIS — Z98.52 S/P VASECTOMY: Primary | ICD-10-CM

## 2018-07-05 ASSESSMENT — PAIN SCALES - GENERAL: PAINLEVEL: NO PAIN (0)

## 2018-07-05 NOTE — LETTER
7/5/2018       RE: David Roberts  [address]  [place] [address]     Dear Colleague,    Thank you for referring your patient, David Roberts, to the [facility] AND [facility]ST FOR PROSTATE AND UROLOGIC CANCERS at [facility]. Please see a copy of my visit note below.        Mr. David oRberts is here to discuss fertility options post vasectomy . He was seen about 6 months ago with the same concerns.    HPI:  David Roberts  is a pleasant 38 year old gentleman who underwent a vasectomy around 2008.  He has had 4 children with a previous spouse. His vasectomy was uncomplicated, and he did do a followup semen analysis which demonstrated sterility. He has remarried and the couple would like a child together.    His spouse is Ceasar, aged ~37. She has had several children  .  Her health is good, cycles are regular , and female factor concerns are: advanced age.     He is here to discuss options again for fertility post vasectomy.     REVIEW OF SYSTEMS:  General: negative  Skin: negative  Eyes: negative  Ears/Nose/Throat: negative  Respiratory: negative  Cardiovascular: negative  Gastrointestinal: negative  Genitourinary: see HPI  Musculoskeletal: negative  Neurologic: negative  Psychiatric: negative  Hematologic/Lymphatic/Immunologic: negative  Endocrine: negative      Past Medical History:   Diagnosis Date     Back pain, chronic 11/16/2009    Intermittent , mechanical     Diabetes mellitus (H)         Past Surgical History:   Procedure Laterality Date     HERNIA REPAIR, INGUINAL RT/LT  94,97     VASECTOMY  2007        Family History   Problem Relation Age of Onset     Diabetes Mother      Leukemia Son 3     Hypertension No family hx of      Thyroid Disease No family hx of      Glaucoma No family hx of      Macular Degeneration No family hx of      Cerebrovascular Disease No family hx of      C.A.D. No family hx of         Social History   Substance Use Topics      Smoking status: Never Smoker     Smokeless tobacco: Never Used     Alcohol use Yes      Comment: rarely        Medications as of 4/4/2018:  Current Outpatient Prescriptions   Medication Sig     atorvastatin (LIPITOR) 10 MG tablet Take 1 tablet (10 mg) by mouth daily     blood glucose (NO BRAND SPECIFIED) lancets standard Use to test blood sugar 4 times daily or as directed.     blood glucose monitoring (NO BRAND SPECIFIED) meter device kit Use to test blood sugar 4 times daily or as directed.     blood glucose monitoring (NO BRAND SPECIFIED) test strip Use to test blood sugars 4 times daily or as directed     insulin detemir (LEVEMIR FLEXPEN/FLEXTOUCH) 100 UNIT/ML injection Inject 18 Units Subcutaneous every morning Take at same time each day.     insulin pen needle (B-D U/F) 31G X 5 MM Use once daily or as directed.     insulin pen needle 31G X 6 MM Use once daily or as directed.     lisinopril (PRINIVIL/ZESTRIL) 2.5 MG tablet Take 1 tablet (2.5 mg) by mouth daily     metFORMIN (GLUCOPHAGE-XR) 500 MG 24 hr tablet Take 2 tablets (1,000 mg) by mouth 2 times daily (with meals)     No current facility-administered medications for this visit.           Allergies   Allergen Reactions     Chloroquine      Other [Seasonal Allergies]      NEVAQUINE       GENERAL PHYSICAL EXAM:   /82  Pulse 78  Ht 1.829 m (6')  Wt 78.9 kg (174 lb)  BMI 23.6 kg/m2     Constitutional: No acute distress. Well nourished.   PSYCH: normal mood and affect.  NEURO: normal gait, no focal deficits.   EYES: anicteric, EOMI, PERR.  CARDIOPULMONARY: breathing non-labored, pulse regular rate/rhythm, no peripheral edema.  GI: Abdomen: nondistended   MUSCULOSKELETAL: normal limb proportions, no muscle wasting, no contractures.     EXAM:  Deferred today.    Imaging/labs:  Lab Results   Component Value Date    CR 1.22 07/26/2016    CR 1.24 11/12/2014    CR 1.16 08/19/2013     ASSESSMENT: Fertility options post vasectomy    PLAN:  I reviewed  the pros and cons of vasectomy reversal versus  sperm acquisition for use in in-vitro fertilization.   We discussed the considerations of invasiveness to either partner, the number of children desired, cost, and need for possible re-sterilization after a vasectomy reversal.   Both reversal and IVF are options that might be successful.  With her age, IVF might be better.  We discussed the patency and pregnancy rates with vasovasostomy and vasoepididymostomy.  We discussed that he hopefully would be a vasovasostomy on each side, but certainly could require a vasoepididymostomy on one or both sides, and that the likelihood of VE increases as time from the initial vasectomy increases.  We discussed sperm retrieval, which could likely be done with testicular aspiration under local in the office.    I encouraged them to think about their options and let me know how they would like to proceed.     20 min visit, over 50% face to face in counseling/discussion of fertility options today.    Info for  [facility] financial counseling given.    Yobani Castro MD,    Urological Surgeon

## 2018-07-05 NOTE — MR AVS SNAPSHOT
After Visit Summary   2018    David Roberts    MRN: [id number]           Patient Information     Date Of Birth          1980        Visit Information        Provider Department      2018 3:40 PM Yobani Castro MD [facility] and [facility]        Care Instructions    Abel   Financial [place].    [phone]      It was a pleasure meeting with you today.  Thank you for allowing me and my team the privilege of caring for you today.  YOU are the reason we are here, and I truly hope we provided you with the excellent service you deserve.  Please let us know if there is anything else we can do for you so that we can be sure you are leaving completely satisfied with your care experience.                  Follow-ups after your visit        Who to contact     Please call your clinic at [phone] to:    Ask questions about your health    Make or cancel appointments    Discuss your medicines    Learn about your test results    Speak to your doctor            Additional Information About Your Visit        MyChart Information     Spotbros is an electronic gateway that provides easy, online access to your medical records. With Spotbros, you can request a clinic appointment, read your test results, renew a prescription or communicate with your care team.     To sign up for Together [employer] visit the website at [email or web address]   You will be asked to enter the access code listed below, as well as some personal information. Please follow the directions to create your username and password.     Your access code is: [email or web address]  Expires: 2018  4:11 PM     Your access code will  in 90 days. If you need help or a new code, please contact your [facility] or call [phone] for assistance.        Care EveryWhere ID     This is your Care EveryWhere ID. This could be used by other organizations to access your [place]  medical records  [id number]        Your Vitals Were     Pulse Height BMI (Body Mass Index)             78 1.829 m (6') 23.6 kg/m2          Blood Pressure from Last 3 Encounters:   07/05/18 138/82   05/30/18 110/70   04/26/18 110/72    Weight from Last 3 Encounters:   07/05/18 78.9 kg (174 lb)   05/30/18 78.9 kg (174 lb)   05/16/18 78.7 kg (173 lb 9.6 oz)              Today, you had the following     No orders found for display       Primary Care Provider Office Phone # Fax #    Dinah Chapin Woo [phone] [address] [facility] [address]        Equal Access to Services     IAN MARIN : Hadii aad ku hadasho Soomaali, waaxda luqadaha, qaybta kaalmada adeegyada, william rick . So [facility] [phone].    ATENCIÓN: Si habla español, tiene a savage disposición servicios gratuitos de asistencia lingüística. Llame al [phone].    We comply with applicable federal civil rights laws and [place] laws. We do not discriminate on the basis of race, color, national origin, age, disability, sex, sexual orientation, or gender identity.            Thank you!     Thank you for choosing [facility] AND [facility]  for your care. Our goal is always to provide you with excellent care. Hearing back from our patients is one way we can continue to improve our services. Please take a few minutes to complete the written survey that you may receive in the mail after your visit with us. Thank you!             Your Updated Medication List - Protect others around you: Learn how to safely use, store and throw away your medicines at www.disposemymeds.org.          This list is accurate as of 7/5/18  4:07 PM.  Always use your most recent med list.                   Brand Name Dispense Instructions for use Diagnosis    atorvastatin 10 MG tablet    LIPITOR    30 tablet    Take 1 tablet (10 mg) by mouth daily    Type 2 diabetes mellitus with  hyperglycemia, with long-term current use of insulin (H)       blood glucose lancets standard    no brand specified    150 each    Use to test blood sugar 4 times daily or as directed.    Type 2 diabetes mellitus with hyperglycemia, without long-term current use of insulin (H)       blood glucose monitoring meter device kit    no brand specified    1 kit    Use to test blood sugar 4 times daily or as directed.    Type 2 diabetes mellitus with hyperglycemia, without long-term current use of insulin (H)       blood glucose monitoring test strip    no brand specified    150 strip    Use to test blood sugars 4 times daily or as directed    Type 2 diabetes mellitus with hyperglycemia, without long-term current use of insulin (H)       insulin detemir 100 UNIT/ML injection    LEVEMIR FLEXPEN/FLEXTOUCH    15 mL    Inject 18 Units Subcutaneous every morning Take at same time each day.    Type 2 diabetes mellitus with hyperglycemia, with long-term current use of insulin (H)       * insulin pen needle 31G X 6 MM     100 each    Use once daily or as directed.    Type 2 diabetes mellitus with hyperglycemia, without long-term current use of insulin (H)       * insulin pen needle 31G X 5 MM    B-D U/F    100 each    Use once daily or as directed.    Diabetes mellitus, type 2 (H)       lisinopril 2.5 MG tablet    PRINIVIL/Zestril    30 tablet    Take 1 tablet (2.5 mg) by mouth daily    Type 2 diabetes mellitus with hyperglycemia, with long-term current use of insulin (H)       metFORMIN 500 MG 24 hr tablet    GLUCOPHAGE-XR    120 tablet    Take 2 tablets (1,000 mg) by mouth 2 times daily (with meals)    Type 2 diabetes mellitus with hyperglycemia, with long-term current use of insulin (H)       * Notice:  This list has 2 medication(s) that are the same as other medications prescribed for you. Read the directions carefully, and ask your doctor or other care provider to review them with you.

## 2018-07-05 NOTE — PATIENT INSTRUCTIONS
Abel   Financial Counseling.    [phone]      It was a pleasure meeting with you today.  Thank you for allowing me and my team the privilege of caring for you today.  YOU are the reason we are here, and I truly hope we provided you with the excellent service you deserve.  Please let us know if there is anything else we can do for you so that we can be sure you are leaving completely satisfied with your care experience.

## 2018-07-05 NOTE — NURSING NOTE
Chief Complaint   Patient presents with     Follow Up For     history of vasectomy coming in for fertility discussion, IVF discussion       Blood pressure 138/82, pulse 78, height 1.829 m (6'), weight 78.9 kg (174 lb). Body mass index is 23.6 kg/(m^2).    Patient Active Problem List   Diagnosis     Back pain, chronic     CARDIOVASCULAR SCREENING; LDL GOAL LESS THAN 100     S/P vasectomy     Type 2 diabetes mellitus without complication (H)     Dyshidrotic eczema     Type 2 diabetes mellitus with hyperglycemia, with long-term current use of insulin (H)       Allergies   Allergen Reactions     Chloroquine      Other [Seasonal Allergies]      NEVAQUINE       Current Outpatient Prescriptions   Medication Sig Dispense Refill     atorvastatin (LIPITOR) 10 MG tablet Take 1 tablet (10 mg) by mouth daily 30 tablet 1     blood glucose (NO BRAND SPECIFIED) lancets standard Use to test blood sugar 4 times daily or as directed. 150 each 11     blood glucose monitoring (NO BRAND SPECIFIED) meter device kit Use to test blood sugar 4 times daily or as directed. 1 kit 0     blood glucose monitoring (NO BRAND SPECIFIED) test strip Use to test blood sugars 4 times daily or as directed 150 strip 11     insulin detemir (LEVEMIR FLEXPEN/FLEXTOUCH) 100 UNIT/ML injection Inject 18 Units Subcutaneous every morning Take at same time each day. 15 mL 0     insulin pen needle (B-D U/F) 31G X 5 MM Use once daily or as directed. 100 each prn     insulin pen needle 31G X 6 MM Use once daily or as directed. 100 each prn     lisinopril (PRINIVIL/ZESTRIL) 2.5 MG tablet Take 1 tablet (2.5 mg) by mouth daily 30 tablet 1     metFORMIN (GLUCOPHAGE-XR) 500 MG 24 hr tablet Take 2 tablets (1,000 mg) by mouth 2 times daily (with meals) 120 tablet 1       Social History   Substance Use Topics     Smoking status: Never Smoker     Smokeless tobacco: Never Used     Alcohol use Yes      Comment: rarely       Marcelle John LPN  7/5/2018  3:37 PM

## 2018-07-08 NOTE — PROGRESS NOTES
Mr. David Roberts is here to discuss fertility options post vasectomy . He was seen about 6 months ago with the same concerns.    HPI:  David Roberts  is a pleasant 38 year old gentleman who underwent a vasectomy around 2008.  He has had 4 children with a previous spouse. His vasectomy was uncomplicated, and he did do a followup semen analysis which demonstrated sterility. He has remarried and the couple would like a child together.    His spouse is Ceasar, aged ~37. She has had several children  .  Her health is good, cycles are regular , and female factor concerns are: advanced age.     He is here to discuss options again for fertility post vasectomy.     REVIEW OF SYSTEMS:  General: negative  Skin: negative  Eyes: negative  Ears/Nose/Throat: negative  Respiratory: negative  Cardiovascular: negative  Gastrointestinal: negative  Genitourinary: see HPI  Musculoskeletal: negative  Neurologic: negative  Psychiatric: negative  Hematologic/Lymphatic/Immunologic: negative  Endocrine: negative      Past Medical History:   Diagnosis Date     Back pain, chronic 11/16/2009    Intermittent , mechanical     Diabetes mellitus (H)         Past Surgical History:   Procedure Laterality Date     HERNIA REPAIR, INGUINAL RT/LT  94,97     VASECTOMY  2007        Family History   Problem Relation Age of Onset     Diabetes Mother      Leukemia Son 3     Hypertension No family hx of      Thyroid Disease No family hx of      Glaucoma No family hx of      Macular Degeneration No family hx of      Cerebrovascular Disease No family hx of      C.A.D. No family hx of         Social History   Substance Use Topics     Smoking status: Never Smoker     Smokeless tobacco: Never Used     Alcohol use Yes      Comment: rarely        Medications as of 4/4/2018:  Current Outpatient Prescriptions   Medication Sig     atorvastatin (LIPITOR) 10 MG tablet Take 1 tablet (10 mg) by mouth daily     blood glucose (NO BRAND SPECIFIED) lancets standard  Use to test blood sugar 4 times daily or as directed.     blood glucose monitoring (NO BRAND SPECIFIED) meter device kit Use to test blood sugar 4 times daily or as directed.     blood glucose monitoring (NO BRAND SPECIFIED) test strip Use to test blood sugars 4 times daily or as directed     insulin detemir (LEVEMIR FLEXPEN/FLEXTOUCH) 100 UNIT/ML injection Inject 18 Units Subcutaneous every morning Take at same time each day.     insulin pen needle (B-D U/F) 31G X 5 MM Use once daily or as directed.     insulin pen needle 31G X 6 MM Use once daily or as directed.     lisinopril (PRINIVIL/ZESTRIL) 2.5 MG tablet Take 1 tablet (2.5 mg) by mouth daily     metFORMIN (GLUCOPHAGE-XR) 500 MG 24 hr tablet Take 2 tablets (1,000 mg) by mouth 2 times daily (with meals)     No current facility-administered medications for this visit.           Allergies   Allergen Reactions     Chloroquine      Other [Seasonal Allergies]      NEVAQUINE       GENERAL PHYSICAL EXAM:   /82  Pulse 78  Ht 1.829 m (6')  Wt 78.9 kg (174 lb)  BMI 23.6 kg/m2     Constitutional: No acute distress. Well nourished.   PSYCH: normal mood and affect.  NEURO: normal gait, no focal deficits.   EYES: anicteric, EOMI, PERR.  CARDIOPULMONARY: breathing non-labored, pulse regular rate/rhythm, no peripheral edema.  GI: Abdomen: nondistended   MUSCULOSKELETAL: normal limb proportions, no muscle wasting, no contractures.     EXAM:  Deferred today.    Imaging/labs:  Lab Results   Component Value Date    CR 1.22 07/26/2016    CR 1.24 11/12/2014    CR 1.16 08/19/2013     ASSESSMENT: Fertility options post vasectomy    PLAN:  I reviewed the pros and cons of vasectomy reversal versus  sperm acquisition for use in in-vitro fertilization.   We discussed the considerations of invasiveness to either partner, the number of children desired, cost, and need for possible re-sterilization after a vasectomy reversal.   Both reversal and IVF are options that might be  successful.  With her age, IVF might be better.  We discussed the patency and pregnancy rates with vasovasostomy and vasoepididymostomy.  We discussed that he hopefully would be a vasovasostomy on each side, but certainly could require a vasoepididymostomy on one or both sides, and that the likelihood of VE increases as time from the initial vasectomy increases.  We discussed sperm retrieval, which could likely be done with testicular aspiration under local in the office.    I encouraged them to think about their options and let me know how they would like to proceed.     20 min visit, over 50% face to face in counseling/discussion of fertility options today.    Info for  [employer] financial counseling given.    Yobani Castro MD,    Urological Surgeon

## 2018-07-18 ENCOUNTER — TELEPHONE (OUTPATIENT)
Dept: FAMILY MEDICINE | Facility: CLINIC | Age: 38
End: 2018-07-18

## 2018-07-18 NOTE — TELEPHONE ENCOUNTER
Panel Management Review      Lab Results   Component Value Date    A1C 13.2 04/26/2018    A1C 6.8 07/26/2016     Last Office Visit with this department: 5/30/2018      Patient is due/failing the following:   A1C    Action needed:   Patient needs office visit for Diabetic check with provider.    Type of outreach:    Phone, left message for patient to call back.     Alis Alicea, Medical Assistant

## 2018-08-10 ENCOUNTER — TELEPHONE (OUTPATIENT)
Dept: FAMILY MEDICINE | Facility: CLINIC | Age: 38
End: 2018-08-10

## 2018-08-10 DIAGNOSIS — E11.65 TYPE 2 DIABETES MELLITUS WITH HYPERGLYCEMIA, WITH LONG-TERM CURRENT USE OF INSULIN (H): ICD-10-CM

## 2018-08-10 DIAGNOSIS — Z79.4 TYPE 2 DIABETES MELLITUS WITH HYPERGLYCEMIA, WITH LONG-TERM CURRENT USE OF INSULIN (H): ICD-10-CM

## 2018-08-10 RX ORDER — ATORVASTATIN CALCIUM 10 MG/1
10 TABLET, FILM COATED ORAL DAILY
Qty: 30 TABLET | Refills: 0 | Status: SHIPPED | OUTPATIENT
Start: 2018-08-10 | End: 2018-08-29

## 2018-08-10 RX ORDER — LISINOPRIL 2.5 MG/1
2.5 TABLET ORAL DAILY
Qty: 30 TABLET | Refills: 0 | Status: SHIPPED | OUTPATIENT
Start: 2018-08-10 | End: 2018-08-29

## 2018-08-10 NOTE — TELEPHONE ENCOUNTER
Given 30 day prescription.  No further refills without follow up. Please assist patient with scheduling appointment

## 2018-08-10 NOTE — TELEPHONE ENCOUNTER
Routing refill request to provider for review/approval because:  Patient was given 30 plus one in May and told to follow up in 30 days.     Charla Brown RN, [facility]

## 2018-08-10 NOTE — TELEPHONE ENCOUNTER
"Requested Prescriptions   Pending Prescriptions Disp Refills     atorvastatin (LIPITOR) 10 MG tablet  Last Written Prescription Date:  5/30/18  Last Fill Quantity: 30 tablet,  # refills: 1   Last office visit: 5/30/2018 with prescribing provider:  Ly CRISTINA   Future Office Visit:   30 tablet 1     Sig: Take 1 tablet (10 mg) by mouth daily    Statins Protocol Passed    8/10/2018 11:27 AM       Passed - LDL on file in past 12 months    Recent Labs   Lab Test  05/30/18   1641   LDL  90            Passed - No abnormal creatine kinase in past 12 months    No lab results found.            Passed - Recent (12 mo) or future (30 days) visit within the authorizing provider's specialty    Patient had office visit in the last 12 months or has a visit in the next 30 days with authorizing provider or within the authorizing provider's specialty.  See \"Patient Info\" tab in inbasket, or \"Choose Columns\" in Meds & Orders section of the refill encounter.           Passed - Patient is age 18 or older                lisinopril (PRINIVIL/ZESTRIL) 2.5 MG tablet  Last Written Prescription Date:  5/30/18  Last Fill Quantity: 30 tablet,  # refills: 1   Last office visit: 5/30/2018 with prescribing provider:  Ly CRISTINA   Future Office Visit:   30 tablet 1     Sig: Take 1 tablet (2.5 mg) by mouth daily    ACE Inhibitors (Including Combos) Protocol Passed    8/10/2018 11:27 AM       Passed - Blood pressure under 140/90 in past 12 months    BP Readings from Last 3 Encounters:   07/05/18 138/82   05/30/18 110/70   04/26/18 110/72                Passed - Recent (12 mo) or future (30 days) visit within the authorizing provider's specialty    Patient had office visit in the last 12 months or has a visit in the next 30 days with authorizing provider or within the authorizing provider's specialty.  See \"Patient Info\" tab in inbasket, or \"Choose Columns\" in Meds & Orders section of the refill encounter.           " Passed - Patient is age 18 or older       Passed - Normal serum creatinine on file in past 12 months    Recent Labs   Lab Test  05/30/18   1641   CR  1.01            Passed - Normal serum potassium on file in past 12 months    Recent Labs   Lab Test  05/30/18   1641   POTASSIUM  3.7

## 2018-08-13 NOTE — TELEPHONE ENCOUNTER
This writer attempted to contact 1 on 08/13/18      Reason for call appointment and left message.      If patient calls back:   PLEASE SCHEDULE PATIENT FOR OFFICE VISIT.      LXIONG3, MEDICAL ASSISTANT

## 2018-08-23 DIAGNOSIS — E11.65 TYPE 2 DIABETES MELLITUS WITH HYPERGLYCEMIA, WITHOUT LONG-TERM CURRENT USE OF INSULIN (H): ICD-10-CM

## 2018-08-23 DIAGNOSIS — E11.9 DIABETES MELLITUS, TYPE 2 (H): ICD-10-CM

## 2018-08-23 NOTE — TELEPHONE ENCOUNTER
Reason for Call:  Medication or medication refill:    Do you use a [facility]?  Name of the pharmacy and phone number for the current request:  [facility] [id number] [place] [address] AT [facility]    Name of the medication requested: Pending Prescriptions:                       Disp   Refills    insulin pen needle (B-D U/F) 31G X 5 MM   100 ea*prn          Sig: Use once daily or as directed.    blood glucose monitoring (NO BRAND SPECIF*1 kit  0            Sig: Use to test blood sugar 4 times daily or as           directed.    Other request:  Please call when ready.  Not sure of the needle length 5 or 6 mm.    Can we leave a detailed message on this number? YES    Phone number patient can be reached at: Cell number on file:    Telephone Information:   Mobile [phone]       Best Time: any    Call taken on 8/23/2018 at 4:59 PM by Shaniqua Donahue

## 2018-08-24 NOTE — TELEPHONE ENCOUNTER
"Requested Prescriptions   Pending Prescriptions Disp Refills     insulin pen needle (B-D U/F) 31G X 5  each 11     Sig: Use once daily or as directed.    Diabetic Supplies Protocol Passed    8/23/2018  5:00 PM       Passed - Patient is 18 years of age or older       Passed - Recent (6 mo) or future (30 days) visit within the authorizing provider's specialty    Patient had office visit in the last 6 months or has a visit in the next 30 days with authorizing provider.  See \"Patient Info\" tab in inbasket, or \"Choose Columns\" in Meds & Orders section of the refill encounter.            blood glucose monitoring (NO BRAND SPECIFIED) meter device kit 1 kit 0     Sig: Use to test blood sugar 4 times daily or as directed.    Diabetic Supplies Protocol Passed    8/23/2018  5:00 PM       Passed - Patient is 18 years of age or older       Passed - Recent (6 mo) or future (30 days) visit within the authorizing provider's specialty    Patient had office visit in the last 6 months or has a visit in the next 30 days with authorizing provider.  See \"Patient Info\" tab in inbasket, or \"Choose Columns\" in Meds & Orders section of the refill encounter.            Prescription approved per [facility] Refill Protocol.    Talib An RN, BSN    "

## 2018-08-29 ENCOUNTER — OFFICE VISIT (OUTPATIENT)
Dept: FAMILY MEDICINE | Facility: CLINIC | Age: 38
End: 2018-08-29
Payer: COMMERCIAL

## 2018-08-29 VITALS
HEART RATE: 70 BPM | DIASTOLIC BLOOD PRESSURE: 75 MMHG | TEMPERATURE: 98 F | HEIGHT: 72 IN | SYSTOLIC BLOOD PRESSURE: 119 MMHG | OXYGEN SATURATION: 98 % | WEIGHT: 183 LBS | BODY MASS INDEX: 24.79 KG/M2 | RESPIRATION RATE: 17 BRPM

## 2018-08-29 DIAGNOSIS — Z79.4 TYPE 2 DIABETES MELLITUS WITH HYPERGLYCEMIA, WITH LONG-TERM CURRENT USE OF INSULIN (H): Primary | ICD-10-CM

## 2018-08-29 DIAGNOSIS — E11.65 TYPE 2 DIABETES MELLITUS WITH HYPERGLYCEMIA, WITH LONG-TERM CURRENT USE OF INSULIN (H): Primary | ICD-10-CM

## 2018-08-29 LAB — HBA1C MFR BLD: 8.4 % (ref 0–5.6)

## 2018-08-29 PROCEDURE — 99214 OFFICE O/P EST MOD 30 MIN: CPT | Performed by: PHYSICIAN ASSISTANT

## 2018-08-29 PROCEDURE — 83036 HEMOGLOBIN GLYCOSYLATED A1C: CPT | Performed by: PHYSICIAN ASSISTANT

## 2018-08-29 PROCEDURE — 36415 COLL VENOUS BLD VENIPUNCTURE: CPT | Performed by: PHYSICIAN ASSISTANT

## 2018-08-29 RX ORDER — LISINOPRIL 2.5 MG/1
2.5 TABLET ORAL DAILY
Qty: 30 TABLET | Refills: 1 | Status: SHIPPED | OUTPATIENT
Start: 2018-08-29 | End: 2019-02-13

## 2018-08-29 RX ORDER — METFORMIN HCL 500 MG
1000 TABLET, EXTENDED RELEASE 24 HR ORAL 2 TIMES DAILY WITH MEALS
Qty: 120 TABLET | Refills: 1 | Status: SHIPPED | OUTPATIENT
Start: 2018-08-29 | End: 2019-02-13

## 2018-08-29 RX ORDER — ATORVASTATIN CALCIUM 10 MG/1
10 TABLET, FILM COATED ORAL DAILY
Qty: 30 TABLET | Refills: 1 | Status: SHIPPED | OUTPATIENT
Start: 2018-08-29 | End: 2019-02-13

## 2018-08-29 ASSESSMENT — PAIN SCALES - GENERAL: PAINLEVEL: MODERATE PAIN (4)

## 2018-08-29 NOTE — MR AVS SNAPSHOT
After Visit Summary   8/29/2018    David Roberts    MRN: [id number]           Patient Information     Date Of Birth          1980        Visit Information        Provider Department      8/29/2018 4:40 PM Ly Means PA-C [facility]        Today's Diagnoses     Type 2 diabetes mellitus with hyperglycemia, with long-term current use of insulin (H)    -  1      Care Instructions    Increase insulin to 20 units daily and follow up with us in one month.  Check your blood sugars 4 times a day.      At [facility], we strive to deliver an exceptional experience to you, every time we see you.  If you receive a survey in the mail, please send us back your thoughts. We really do value your feedback.    Suggested websites for health information:  Www.[facility].org : Up to date and easily searchable information on multiple topics.  Www.medlineplus.gov : medication info, interactive tutorials, watch real surgeries online  Www.familydoctor.org : good info from the Academy of Family Physicians  Www.cdc.gov : public health info, travel advisories, epidemics (H1N1)  Www.aap.org : children's health info, normal development, vaccinations  Www.health.[facility].[place].us : [place] dept of health, public health issues in [place], N1N1    Your care team:     Family Medicine   PATRICK Machado MD Emily Bunt, APRN CNP S. MD Samantha Mendoza MD Angela Wermerskirchen, MD         Clinic hours: Monday - Wednesday 7 am-7 pm   Thursdays and Fridays 7 am-5 pm.     [facility]: Monday - Friday 11 am-9 pm,   Saturday and Sunday 9 am-5 pm.    [facility]: Monday -Thursday 8 am-8 pm; Friday 8 am-6 pm; Saturday and Sunday 9 am-5 pm.     [facility]: Monday - Thursday 8 am - 7 pm; Friday 8 am - 6 pm    Clinic: [phone]   [facility]: [phone]   [facility]: [phone]                  Follow-ups after your visit        Follow-up notes from your care team     Return in about 1 month (around 2018) for diabetes check.      Who to contact     If you have questions or need follow up information about today's clinic visit or your schedule please contact [facility] [place] [place] directly at [phone].  Normal or non-critical lab and imaging results will be communicated to you by MyChart, letter or phone within 4 business days after the clinic has received the results. If you do not hear from us within 7 days, please contact the clinic through MyChart or phone. If you have a critical or abnormal lab result, we will notify you by phone as soon as possible.  Submit refill requests through Webs[employer] or call your pharmacy and they will forward the refill request to us. Please allow 3 business days for your refill to be completed.          Additional Information About Your Visit        [employer] Information     [employer]bsense lets you send messages to your doctor, view your test results, renew your prescriptions, schedule appointments and more. To sign up, go to [email or web address] . Click on \"Log in\" on the left side of the screen, which will take you to the Welcome page. Then click on \"Sign up Now\" on the right side of the page.     You will be asked to enter the access code listed below, as well as some personal information. Please follow the directions to create your username and password.     Your access code is: [email or web address]  Expires: 2018  5:10 PM     Your access code will  in 90 days. If you need help or a new code, please call your [facility] or [phone].        Care EveryWhere ID     This is your Care EveryWhere ID. This could be used by other organizations to access your [place] medical records  [id number]        Your Vitals Were     Pulse Temperature Respirations Height Pulse Oximetry BMI (Body Mass Index)    70 98  F (36.7  C) (Oral) 17 1.829 m " (6') 98% 24.82 kg/m2       Blood Pressure from Last 3 Encounters:   08/29/18 119/75   07/05/18 138/82   05/30/18 110/70    Weight from Last 3 Encounters:   08/29/18 83 kg (183 lb)   07/05/18 78.9 kg (174 lb)   05/30/18 78.9 kg (174 lb)              We Performed the Following     Hemoglobin A1c          Today's Medication Changes          These changes are accurate as of 8/29/18  5:10 PM.  If you have any questions, ask your nurse or doctor.               These medicines have changed or have updated prescriptions.        Dose/Directions    atorvastatin 10 MG tablet   Commonly known as:  LIPITOR   This may have changed:  additional instructions   Used for:  Type 2 diabetes mellitus with hyperglycemia, with long-term current use of insulin (H)   Changed by:  Ly Means PA-C        Dose:  10 mg   Take 1 tablet (10 mg) by mouth daily   Quantity:  30 tablet   Refills:  1       insulin detemir 100 UNIT/ML injection   Commonly known as:  LEVEMIR FLEXPEN/FLEXTOUCH   This may have changed:  how much to take   Used for:  Type 2 diabetes mellitus with hyperglycemia, with long-term current use of insulin (H)   Changed by:  Ly Means PA-C        Dose:  20 Units   Inject 20 Units Subcutaneous every morning Take at same time each day.   Quantity:  15 mL   Refills:  3            Where to get your medicines      These medications were sent to [facility] [address]  [place], [address] AT [address] [place] [address]     Phone:  [phone]     atorvastatin 10 MG tablet    blood glucose lancets standard    blood glucose monitoring meter device kit    blood glucose monitoring test strip    insulin detemir 100 UNIT/ML injection    lisinopril 2.5 MG tablet    metFORMIN 500 MG 24 hr tablet                Primary Care Provider Office Phone # Fax #    Dinah Woo [phone] [address] [facility] [address]         Equal Access to Services     [facility] : Hadii aad ku hadhermelindoabimael Somarlene, waaxda luqadaha, qaybta kaalmawilliam shi. So [facility] [phone].    ATENCIÓN: Si habla español, tiene a savage disposición servicios gratuitos de asistencia lingüística. Llame al [phone].    We comply with applicable federal civil rights laws and [place] laws. We do not discriminate on the basis of race, color, national origin, age, disability, sex, sexual orientation, or gender identity.            Thank you!     Thank you for choosing [facility]  for your care. Our goal is always to provide you with excellent care. Hearing back from our patients is one way we can continue to improve our services. Please take a few minutes to complete the written survey that you may receive in the mail after your visit with us. Thank you!             Your Updated Medication List - Protect others around you: Learn how to safely use, store and throw away your medicines at www.disposemymeds.org.          This list is accurate as of 8/29/18  5:10 PM.  Always use your most recent med list.                   Brand Name Dispense Instructions for use Diagnosis    atorvastatin 10 MG tablet    LIPITOR    30 tablet    Take 1 tablet (10 mg) by mouth daily    Type 2 diabetes mellitus with hyperglycemia, with long-term current use of insulin (H)       blood glucose lancets standard    no brand specified    150 each    Use to test blood sugar 4 times daily or as directed.    Type 2 diabetes mellitus with hyperglycemia, with long-term current use of insulin (H)       blood glucose monitoring meter device kit    no brand specified    1 kit    Use to test blood sugar 4 times daily or as directed.    Type 2 diabetes mellitus with hyperglycemia, with long-term current use of insulin (H)       blood glucose monitoring test strip    no brand specified    150 strip    Use to test blood sugars 4 times daily or as  directed    Type 2 diabetes mellitus with hyperglycemia, with long-term current use of insulin (H)       insulin detemir 100 UNIT/ML injection    LEVEMIR FLEXPEN/FLEXTOUCH    15 mL    Inject 20 Units Subcutaneous every morning Take at same time each day.    Type 2 diabetes mellitus with hyperglycemia, with long-term current use of insulin (H)       insulin pen needle 31G X 5 MM    B-D U/F    100 each    Use once daily or as directed.    Diabetes mellitus, type 2 (H)       lisinopril 2.5 MG tablet    PRINIVIL/Zestril    30 tablet    Take 1 tablet (2.5 mg) by mouth daily NO FURTHER REFILLS WITHOUT FOLLOW UP    Type 2 diabetes mellitus with hyperglycemia, with long-term current use of insulin (H)       metFORMIN 500 MG 24 hr tablet    GLUCOPHAGE-XR    120 tablet    Take 2 tablets (1,000 mg) by mouth 2 times daily (with meals)    Type 2 diabetes mellitus with hyperglycemia, with long-term current use of insulin (H)

## 2018-08-29 NOTE — PATIENT INSTRUCTIONS
Increase insulin to 20 units daily and follow up with us in one month.  Check your blood sugars 4 times a day.      [facility] exceptional experience to you, every time we see you.  If you receive a survey in the mail, please send us back your thoughts. We really do value your feedback.    Suggested websites for health information:  Www.[employer].org : Up to date and easily searchable information on multiple topics.  Www.medlineplus.gov : medication info, interactive tutorials, watch real surgeries online  Www.familydoctor.org : good info from the Academy of Family Physicians  Www.cdc.gov : public health info, travel advisories, epidemics (H1N1)  Www.aap.org : children's health info, normal development, vaccinations  Www.health.[facility].[place].us : [place] dept of health, public health issues in [place], N1N1    Your care team:     Family Medicine   PATRICK Machado MD Emily Bunt, ÁLVARO [facility]   S. MD Samantha Mendoza MD Angela Wermerskirchen, MD         Clinic hours: Monday - Wednesday 7 am-7 pm   Thursdays and Fridays 7 am-5 pm.     [facility]: Monday - Friday 11 am-9 pm,   Saturday and Sunday 9 am-5 pm.    [facility]: Monday -Thursday 8 am-8 pm; Friday 8 am-6 pm; Saturday and Sunday 9 am-5 pm.     [facility]: Monday - Thursday 8 am - 7 pm; Friday 8 am - 6 pm    Clinic: [phone]   [facility]: [phone]   [facility]: [phone]

## 2018-08-29 NOTE — PROGRESS NOTES
SUBJECTIVE:   David Roberts is a 38 year old male who presents to clinic today for the following health issues:      Diabetes Follow-up      Patient is checking blood sugars: rarely.  Results range from 126 to 180    Diabetic concerns: None     Symptoms of hypoglycemia (low blood sugar): dizzy     Paresthesias (numbness or burning in feet) or sores: No     Date of last diabetic eye exam: June 2018    Diabetes Management Resources    Hyperlipidemia Follow-Up      Rate your low fat/cholesterol diet?: good    Taking statin?  Yes, no muscle aches from statin    Other lipid medications/supplements?:  none    Hypertension Follow-up      Outpatient blood pressures are not being checked.    Low Salt Diet: no added salt    BP Readings from Last 2 Encounters:   07/05/18 138/82   05/30/18 110/70     Hemoglobin A1C (%)   Date Value   08/29/2018 8.4 (H)   04/26/2018 13.2 (H)     LDL Cholesterol Calculated (mg/dL)   Date Value   05/30/2018 90   07/26/2016 111 (H)       Amount of exercise or physical activity: None    Problems taking medications regularly: No    Medication side effects: none    Diet: regular (no restrictions)    126 or 187  Fasting like 126, 187 after eat.   Testing blood sugar once a day.    Meter doesn't work at times and requesting new meter    Frequency of urination and thirst have improved.  No symptoms at this time.  Doesn't like idea of being on insulin- prefers to follow up here rather than with diabetes educator         Problem list and histories reviewed & adjusted, as indicated.  Additional history: as documented    Patient Active Problem List   Diagnosis     Back pain, chronic     CARDIOVASCULAR SCREENING; LDL GOAL LESS THAN 100     S/P vasectomy     Type 2 diabetes mellitus without complication (H)     Dyshidrotic eczema     Type 2 diabetes mellitus with hyperglycemia, with long-term current use of insulin (H)     Past Surgical History:   Procedure Laterality Date     HERNIA REPAIR, INGUINAL  RT/LT  94,97     VASECTOMY  2007       Social History   Substance Use Topics     Smoking status: Never Smoker     Smokeless tobacco: Never Used     Alcohol use Yes      Comment: rarely     Family History   Problem Relation Age of Onset     Diabetes Mother      Leukemia Son 3     Hypertension No family hx of      Thyroid Disease No family hx of      Glaucoma No family hx of      Macular Degeneration No family hx of      Cerebrovascular Disease No family hx of      C.A.D. No family hx of          Current Outpatient Prescriptions   Medication Sig Dispense Refill     atorvastatin (LIPITOR) 10 MG tablet Take 1 tablet (10 mg) by mouth daily 30 tablet 1     blood glucose (NO BRAND SPECIFIED) lancets standard Use to test blood sugar 4 times daily or as directed. 150 each 11     blood glucose monitoring (NO BRAND SPECIFIED) meter device kit Use to test blood sugar 4 times daily or as directed. 1 kit 0     blood glucose monitoring (NO BRAND SPECIFIED) test strip Use to test blood sugars 4 times daily or as directed 150 strip 11     insulin detemir (LEVEMIR FLEXPEN/FLEXTOUCH) 100 UNIT/ML injection Inject 20 Units Subcutaneous every morning Take at same time each day. 15 mL 3     insulin pen needle (B-D U/F) 31G X 5 MM Use once daily or as directed. 100 each 11     lisinopril (PRINIVIL/ZESTRIL) 2.5 MG tablet Take 1 tablet (2.5 mg) by mouth daily NO FURTHER REFILLS WITHOUT FOLLOW UP 30 tablet 1     metFORMIN (GLUCOPHAGE-XR) 500 MG 24 hr tablet Take 2 tablets (1,000 mg) by mouth 2 times daily (with meals) 120 tablet 1     Recent Labs   Lab Test  08/29/18   1646  05/30/18   1641  04/26/18   1522  07/26/16   1608  11/12/14   1623  08/19/13   0956   A1C  8.4*   --   13.2*  6.8*  5.8  5.6   LDL   --   90   --   111*  81  99   HDL   --   41   --   38*   --   36*   TRIG   --   110   --   126   --   97   ALT   --   60  32   --   30  27   CR   --   1.01  1.23  1.22  1.24  1.16   GFRESTIMATED   --   83  66  67  67  73   GFRESTBLACK   --    >90  80  81  80  88   POTASSIUM   --   3.7  4.1   --   3.9  3.9   TSH   --    --   1.26  2.11   --    --       BP Readings from Last 3 Encounters:   08/29/18 119/75   07/05/18 138/82   05/30/18 110/70    Wt Readings from Last 3 Encounters:   08/29/18 83 kg (183 lb)   07/05/18 78.9 kg (174 lb)   05/30/18 78.9 kg (174 lb)                    Reviewed and updated as needed this visit by clinical staff  Tobacco  Allergies  Meds  Problems  Med Hx  Surg Hx  Fam Hx  Soc Hx        Reviewed and updated as needed this visit by Provider  Tobacco  Allergies  Meds  Problems  Med Hx  Surg Hx  Fam Hx  Soc Hx          ROS:  Constitutional, HEENT, cardiovascular, pulmonary, gi and gu systems are negative, except as otherwise noted.    OBJECTIVE:     /75 (BP Location: Right arm, Patient Position: Chair, Cuff Size: Adult Large)  Pulse 70  Temp 98  F (36.7  C) (Oral)  Resp 17  Ht 1.829 m (6')  Wt 83 kg (183 lb)  SpO2 98%  BMI 24.82 kg/m2  Body mass index is 24.82 kg/(m^2).  GENERAL: healthy, alert and no distress  NECK: no adenopathy, no asymmetry, masses, or scars and thyroid normal to palpation  RESP: lungs clear to auscultation - no rales, rhonchi or wheezes  CV: regular rate and rhythm, normal S1 S2, no S3 or S4, no murmur, click or rub, no peripheral edema and peripheral pulses strong  ABDOMEN: soft, nontender, no hepatosplenomegaly, no masses and bowel sounds normal  MS: no gross musculoskeletal defects noted, no edema  PSYCH: mentation appears normal, affect normal/bright    Diagnostic Test Results:  Results for orders placed or performed in visit on 08/29/18   Hemoglobin A1c   Result Value Ref Range    Hemoglobin A1C 8.4 (H) 0 - 5.6 %       ASSESSMENT/PLAN:             1. Type 2 diabetes mellitus with hyperglycemia, with long-term current use of insulin (H)  A1C dramatically improved at 8.4 but still higher than goal of 7.0. Increase insulin 2 units and follow up with us with blood sugars in one  month  - Hemoglobin A1c  - atorvastatin (LIPITOR) 10 MG tablet; Take 1 tablet (10 mg) by mouth daily  Dispense: 30 tablet; Refill: 1  - insulin detemir (LEVEMIR FLEXPEN/FLEXTOUCH) 100 UNIT/ML injection; Inject 20 Units Subcutaneous every morning Take at same time each day.  Dispense: 15 mL; Refill: 3  - lisinopril (PRINIVIL/ZESTRIL) 2.5 MG tablet; Take 1 tablet (2.5 mg) by mouth daily NO FURTHER REFILLS WITHOUT FOLLOW UP  Dispense: 30 tablet; Refill: 1  - metFORMIN (GLUCOPHAGE-XR) 500 MG 24 hr tablet; Take 2 tablets (1,000 mg) by mouth 2 times daily (with meals)  Dispense: 120 tablet; Refill: 1  - blood glucose (NO BRAND SPECIFIED) lancets standard; Use to test blood sugar 4 times daily or as directed.  Dispense: 150 each; Refill: 11  - blood glucose monitoring (NO BRAND SPECIFIED) meter device kit; Use to test blood sugar 4 times daily or as directed.  Dispense: 1 kit; Refill: 0  - blood glucose monitoring (NO BRAND SPECIFIED) test strip; Use to test blood sugars 4 times daily or as directed  Dispense: 150 strip; Refill: 11    Patient Instructions     Increase insulin to 20 units daily and follow up with us in one month.  Check your blood sugars 4 times a day.      At [facility], we strive to deliver an exceptional experience to you, every time we see you.  If you receive a survey in the mail, please send us back your thoughts. We really do value your feedback.    Suggested websites for health information:  Www.[facility].org : Up to date and easily searchable information on multiple topics.  Www.medlineplus.gov : medication info, interactive tutorials, watch real surgeries online  Www.familydoctor.org : good info from the Academy of Family Physicians  Www.cdc.gov : public health info, travel advisories, epidemics (H1N1)  Www.aap.org : children's health info, normal development, vaccinations  Www.health.state[place].us : [place] dept of health, public health issues in [place], N1N1    Your care team:     Family  Medicine   PATRICK Machado MD Emily Bunt, ÁLVARO [facility]   S. MD Samantha Mendoza MD Angela Wermerskirchen, MD         Clinic hours: Monday - Wednesday 7 am-7 pm   Thursdays and Fridays 7 am-5 pm.     [facility]: Monday - Friday 11 am-9 pm,   Saturday and Sunday 9 am-5 pm.    [facility]: Monday -Thursday 8 am-8 pm; Friday 8 am-6 pm; Saturday and Sunday 9 am-5 pm.     [facility]: Monday - Thursday 8 am - 7 pm; Friday 8 am - 6 pm    Clinic: [phone]   [facility]: [phone]   [facility]: [phone]               Ly Means PA-C  [facility]

## 2018-10-15 ENCOUNTER — TELEPHONE (OUTPATIENT)
Dept: FAMILY MEDICINE | Facility: CLINIC | Age: 38
End: 2018-10-15

## 2018-10-15 DIAGNOSIS — Z79.4 TYPE 2 DIABETES MELLITUS WITH HYPERGLYCEMIA, WITH LONG-TERM CURRENT USE OF INSULIN (H): ICD-10-CM

## 2018-10-15 DIAGNOSIS — E11.65 TYPE 2 DIABETES MELLITUS WITH HYPERGLYCEMIA, WITH LONG-TERM CURRENT USE OF INSULIN (H): ICD-10-CM

## 2018-10-15 NOTE — TELEPHONE ENCOUNTER
"Requested Prescriptions   Pending Prescriptions Disp Refills     insulin detemir (LEVEMIR FLEXPEN/FLEXTOUCH) 100 UNIT/ML injection 15 mL 3     Sig: Inject 20 Units Subcutaneous every morning Take at same time each day.    Long Acting Insulin Protocol Passed    10/15/2018  1:52 PM       Passed - Blood pressure less than 140/90 in past 6 months    BP Readings from Last 3 Encounters:   08/29/18 119/75   07/05/18 138/82   05/30/18 110/70                Passed - LDL on file in past 12 months    Recent Labs   Lab Test  05/30/18   1641   LDL  90            Passed - Microalbumin on file in past 12 months    Recent Labs   Lab Test  05/30/18   1646   MICROL  46   UMALCR  15.77            Passed - Serum creatinine on file in past 12 months    Recent Labs   Lab Test  05/30/18   1641   CR  1.01            Passed - HgbA1C in past 3 or 6 months    If HgbA1C is 8 or greater, it needs to be on file within the past 3 months.  If less than 8, must be on file within the past 6 months.     Recent Labs   Lab Test  08/29/18   1646   A1C  8.4*            Passed - Patient is age 18 or older       Passed - Recent (6 mo) or future (30 days) visit within the authorizing provider's specialty    Patient had office visit in the last 6 months or has a visit in the next 30 days with authorizing provider or within the authorizing provider's specialty.  See \"Patient Info\" tab in inbasket, or \"Choose Columns\" in Meds & Orders section of the refill encounter.            insulin detemir (LEVEMIR FLEXPEN/FLEXTOUCH) 100 UNIT/ML injection  Last Written Prescription Date:  8/29/18  Last Fill Quantity: 15ml,  # refills: 3   Last office visit: 8/29/2018 with prescribing provider:  Dr. Woo   Future Office Visit:      "

## 2018-10-17 NOTE — TELEPHONE ENCOUNTER
Medication Detail      Disp Refills Start End LANE   insulin detemir (LEVEMIR FLEXPEN/FLEXTOUCH) 100 UNIT/ML injection 15 mL 3 8/29/2018  No   Sig - Route: Inject 20 Units Subcutaneous every morning Take at same time each day. - Subcutaneous   Class: E-Prescribe   Order: [id number]   E-Prescribing Status: Receipt confirmed by pharmacy (8/29/2018  5:03 PM CDT)   Printout Tracking   External Result Report   Medication Administration Instructions   Take at same time each day.   Pharmacy   [facility] [address] [place] [address] [place] [address] AT [facility] [address] [place]     Rx sent with three refills in August 2018. Team, please contact patient to determine if refill is needed and/or please advise the patient to call their pharmacy and directly speak with a pharmacy staff member to request refill.     Jeane Soto RN

## 2018-10-17 NOTE — TELEPHONE ENCOUNTER
This writer attempted to contact pt on 10/17/18      Reason for call rx fill and left message.      If patient calls back:   Obtain info below per RN. If rx needed tell pt to contact pharmacy for refill        Lena Roberts CMA

## 2018-10-18 NOTE — TELEPHONE ENCOUNTER
Spoke with pharmacy, they do have refills, will get ready for the patient and informpatient when ready for pickup

## 2019-02-11 DIAGNOSIS — Z79.4 TYPE 2 DIABETES MELLITUS WITH HYPERGLYCEMIA, WITH LONG-TERM CURRENT USE OF INSULIN (H): ICD-10-CM

## 2019-02-11 DIAGNOSIS — E11.65 TYPE 2 DIABETES MELLITUS WITH HYPERGLYCEMIA, WITH LONG-TERM CURRENT USE OF INSULIN (H): ICD-10-CM

## 2019-02-11 NOTE — TELEPHONE ENCOUNTER
"Requested Prescriptions   Pending Prescriptions Disp Refills     atorvastatin (LIPITOR) 10 MG tablet 30 tablet 1     Sig: Take 1 tablet (10 mg) by mouth daily    Statins Protocol Passed - 2/11/2019  8:21 AM       Passed - LDL on file in past 12 months    Recent Labs   Lab Test 05/30/18  1641   LDL 90            Passed - No abnormal creatine kinase in past 12 months    No lab results found.            Passed - Recent (12 mo) or future (30 days) visit within the authorizing provider's specialty    Patient had office visit in the last 12 months or has a visit in the next 30 days with authorizing provider or within the authorizing provider's specialty.  See \"Patient Info\" tab in inbasket, or \"Choose Columns\" in Meds & Orders section of the refill encounter.             Passed - Medication is active on med list       Passed - Patient is age 18 or older        atorvastatin (LIPITOR) 10 MG tablet  Last Written Prescription Date:  8/29/18  Last Fill Quantity: 30,  # refills: 1   Last office visit: 8/29/2018 with prescribing provider:  Ly Means   Future Office Visit:      "

## 2019-02-11 NOTE — TELEPHONE ENCOUNTER
"Requested Prescriptions   Pending Prescriptions Disp Refills     lisinopril (PRINIVIL/ZESTRIL) 2.5 MG tablet 30 tablet 1     Sig: Take 1 tablet (2.5 mg) by mouth daily NO FURTHER REFILLS WITHOUT FOLLOW UP    ACE Inhibitors (Including Combos) Protocol Passed - 2/11/2019  8:19 AM       Passed - Blood pressure under 140/90 in past 12 months    BP Readings from Last 3 Encounters:   08/29/18 119/75   07/05/18 138/82   05/30/18 110/70                Passed - Recent (12 mo) or future (30 days) visit within the authorizing provider's specialty    Patient had office visit in the last 12 months or has a visit in the next 30 days with authorizing provider or within the authorizing provider's specialty.  See \"Patient Info\" tab in inbasket, or \"Choose Columns\" in Meds & Orders section of the refill encounter.             Passed - Medication is active on med list       Passed - Patient is age 18 or older       Passed - Normal serum creatinine on file in past 12 months    Recent Labs   Lab Test 05/30/18  1641   CR 1.01            Passed - Normal serum potassium on file in past 12 months    Recent Labs   Lab Test 05/30/18  1641   POTASSIUM 3.7             lisinopril (PRINIVIL/ZESTRIL) 2.5 MG tablet  Last Written Prescription Date:  8/29/18  Last Fill Quantity: 30,  # refills: 1   Last office visit: 8/29/2018 with prescribing provider:  Ly Means   Future Office Visit:      "

## 2019-02-11 NOTE — TELEPHONE ENCOUNTER
"Requested Prescriptions   Pending Prescriptions Disp Refills     metFORMIN (GLUCOPHAGE-XR) 500 MG 24 hr tablet 120 tablet 1     Sig: Take 2 tablets (1,000 mg) by mouth 2 times daily (with meals)    Biguanide Agents Failed - 2/11/2019  8:23 AM       Failed - Patient has documented A1c within the specified period of time.    If HgbA1C is 8 or greater, it needs to be on file within the past 3 months.  If less than 8, must be on file within the past 6 months.     Recent Labs   Lab Test 08/29/18  1646   A1C 8.4*            Passed - Blood pressure less than 140/90 in past 6 months    BP Readings from Last 3 Encounters:   08/29/18 119/75   07/05/18 138/82   05/30/18 110/70                Passed - Patient has documented LDL within the past 12 mos.    Recent Labs   Lab Test 05/30/18  1641   LDL 90            Passed - Patient has had a Microalbumin in the past 15 mos.    Recent Labs   Lab Test 05/30/18  1646   MICROL 46   UMALCR 15.77            Passed - Patient is age 10 or older       Passed - Patient's CR is NOT>1.4 OR Patient's EGFR is NOT<45 within past 12 mos.    Recent Labs   Lab Test 05/30/18  1641   GFRESTIMATED 83   GFRESTBLACK >90       Recent Labs   Lab Test 05/30/18  1641   CR 1.01            Passed - Patient does NOT have a diagnosis of CHF.       Passed - Medication is active on med list       Passed - Recent (6 mo) or future (30 days) visit within the authorizing provider's specialty    Patient had office visit in the last 6 months or has a visit in the next 30 days with authorizing provider or within the authorizing provider's specialty.  See \"Patient Info\" tab in inbasket, or \"Choose Columns\" in Meds & Orders section of the refill encounter.            metFORMIN (GLUCOPHAGE-XR) 500 MG 24 hr tablet  Last Written Prescription Date:  8/29/18  Last Fill Quantity: 120,  # refills: 1   Last office visit: 8/29/2018 with prescribing provider:  Ly Means   Future Office Visit:      "

## 2019-02-12 ENCOUNTER — TELEPHONE (OUTPATIENT)
Dept: FAMILY MEDICINE | Facility: CLINIC | Age: 39
End: 2019-02-12

## 2019-02-12 NOTE — TELEPHONE ENCOUNTER
Panel Management Review      Lab Results   Component Value Date    A1C 8.4 08/29/2018    A1C 13.2 04/26/2018     Last Office Visit with this department: 10/15/2018          Patient is due/failing the following:   A1C    Action needed:   Patient needs office visit for diabetic check.    Type of outreach:    Phone, left message for patient to call back.  and Sent letter.    [id number], MEDICAL ASSISTANT

## 2019-02-12 NOTE — LETTER
February 12, 2019      David Roberts  [address]  [place] [address]        Dear David,       Your provider has reviewed your chart and noticed we have not seen you in a while. With your diabetes, it is important that we keep a close eye on your A1C at least every 6 months, more if out of range. Please give us a call at [phone] to schedule your diabetic appointment.      Thanks,   [facility]

## 2019-02-13 RX ORDER — ATORVASTATIN CALCIUM 10 MG/1
10 TABLET, FILM COATED ORAL DAILY
Qty: 30 TABLET | Refills: 0 | Status: SHIPPED | OUTPATIENT
Start: 2019-02-13 | End: 2019-09-05

## 2019-02-13 RX ORDER — METFORMIN HCL 500 MG
1000 TABLET, EXTENDED RELEASE 24 HR ORAL 2 TIMES DAILY WITH MEALS
Qty: 120 TABLET | Refills: 0 | Status: SHIPPED | OUTPATIENT
Start: 2019-02-13 | End: 2019-09-04

## 2019-02-13 RX ORDER — LISINOPRIL 2.5 MG/1
2.5 TABLET ORAL DAILY
Qty: 30 TABLET | Refills: 1 | Status: SHIPPED | OUTPATIENT
Start: 2019-02-13 | End: 2019-09-05

## 2019-02-13 NOTE — TELEPHONE ENCOUNTER
Routing refill request to provider for review/approval because:  Labs out of range:  A1C    Lab Results   Component Value Date    A1C 8.4 08/29/2018    A1C 13.2 04/26/2018    A1C 6.8 07/26/2016    A1C 5.8 11/12/2014    A1C 5.6 08/19/2013     Quita Rashid RN, BSN, PHN

## 2019-02-13 NOTE — TELEPHONE ENCOUNTER
Routing refill request to provider for review/approval because:  Phyllis given x1 and patient did not follow up, please advise    Quita Rashid RN, BSN, PHN

## 2019-02-13 NOTE — TELEPHONE ENCOUNTER
I will authorize one refill - patient should be seen for further refills.     PV labs ordered - please have him schedule

## 2019-03-11 DIAGNOSIS — Z79.4 TYPE 2 DIABETES MELLITUS WITH HYPERGLYCEMIA, WITH LONG-TERM CURRENT USE OF INSULIN (H): ICD-10-CM

## 2019-03-11 DIAGNOSIS — E11.65 TYPE 2 DIABETES MELLITUS WITH HYPERGLYCEMIA, WITH LONG-TERM CURRENT USE OF INSULIN (H): ICD-10-CM

## 2019-03-11 NOTE — TELEPHONE ENCOUNTER
"Requested Prescriptions   Pending Prescriptions Disp Refills     blood glucose monitoring (NO BRAND SPECIFIED) meter device kit 1 kit 0     Sig: Use to test blood sugar 4 times daily or as directed.    Diabetic Supplies Protocol Failed - 3/11/2019  9:51 AM       Failed - Recent (6 mo) or future (30 days) visit within the authorizing provider's specialty    Patient had office visit in the last 6 months or has a visit in the next 30 days with authorizing provider.  See \"Patient Info\" tab in inbasket, or \"Choose Columns\" in Meds & Orders section of the refill encounter.           Passed - Medication is active on med list       Passed - Patient is 18 years of age or older        blood glucose monitoring (NO BRAND SPECIFIED) meter device kit  Last Written Prescription Date:  8/29/18  Last Fill Quantity: 1,  # refills: 0   Last office visit: 8/29/2018 with prescribing provider:  Ly Means   Future Office Visit:      "

## 2019-03-13 NOTE — TELEPHONE ENCOUNTER
Routing refill request to provider for review/approval because:  Patient needs to be seen because:  Over due for diabetic recheck      Talib An RN, BSN

## 2019-03-14 NOTE — TELEPHONE ENCOUNTER
This writer attempted to contact pt on 03/14/19      Reason for call schedule OV and left message.      If patient calls back:   Schedule Office Visit appointment within 1 month with PCP, document that pt called and close encounter         Minal Giron MA

## 2019-03-15 NOTE — TELEPHONE ENCOUNTER
This writer attempted to contact pt on 03/15/19        Reason for call schedule OV and left message.        If patient calls back:              Schedule Office Visit appointment within 1 month with PCP, document that pt called and close encounter            Minal Giron MA

## 2019-03-18 NOTE — TELEPHONE ENCOUNTER
LM for pt to call clinic.  3rd attempt, with no response.  Please advise.      Minal BENITES, Patient Care

## 2019-06-07 DIAGNOSIS — E11.65 TYPE 2 DIABETES MELLITUS WITH HYPERGLYCEMIA, WITH LONG-TERM CURRENT USE OF INSULIN (H): ICD-10-CM

## 2019-06-07 DIAGNOSIS — Z79.4 TYPE 2 DIABETES MELLITUS WITH HYPERGLYCEMIA, WITH LONG-TERM CURRENT USE OF INSULIN (H): ICD-10-CM

## 2019-06-07 NOTE — TELEPHONE ENCOUNTER
"Requested Prescriptions   Pending Prescriptions Disp Refills     metFORMIN (GLUCOPHAGE-XR) 500 MG 24 hr tablet  Last Written Prescription Date:  2/13/19  Last Fill Quantity: 120 tablet,  # refills: 0   Last office visit: 8/29/2018 with prescribing provider:  Dr. Woo   Future Office Visit:     120 tablet 0     Sig: Take 2 tablets (1,000 mg) by mouth 2 times daily (with meals)       Biguanide Agents Failed - 6/7/2019  9:06 AM        Failed - Blood pressure less than 140/90 in past 6 months     BP Readings from Last 3 Encounters:   08/29/18 119/75   07/05/18 138/82   05/30/18 110/70                 Failed - Patient has documented LDL within the past 12 mos.     Recent Labs   Lab Test 05/30/18  1641   LDL 90             Failed - Patient has documented A1c within the specified period of time.     If HgbA1C is 8 or greater, it needs to be on file within the past 3 months.  If less than 8, must be on file within the past 6 months.     Recent Labs   Lab Test 08/29/18  1646   A1C 8.4*             Failed - Patient's CR is NOT>1.4 OR Patient's EGFR is NOT<45 within past 12 mos.     Recent Labs   Lab Test 05/30/18  1641   GFRESTIMATED 83   GFRESTBLACK >90       Recent Labs   Lab Test 05/30/18  1641   CR 1.01             Failed - Recent (6 mo) or future (30 days) visit within the authorizing provider's specialty     Patient had office visit in the last 6 months or has a visit in the next 30 days with authorizing provider or within the authorizing provider's specialty.  See \"Patient Info\" tab in inbasket, or \"Choose Columns\" in Meds & Orders section of the refill encounter.            Passed - Patient has had a Microalbumin in the past 15 mos.     Recent Labs   Lab Test 05/30/18  1646   MICROL 46   UMALCR 15.77             Passed - Patient is age 10 or older        Passed - Patient does NOT have a diagnosis of CHF.        Passed - Medication is active on med list          "

## 2019-06-11 RX ORDER — METFORMIN HCL 500 MG
1000 TABLET, EXTENDED RELEASE 24 HR ORAL 2 TIMES DAILY WITH MEALS
Qty: 120 TABLET | Refills: 0 | OUTPATIENT
Start: 2019-06-11

## 2019-06-11 NOTE — TELEPHONE ENCOUNTER
Refill denied to the pharmacy.   If patient schedules an appointment we can provide a maryam refill.

## 2019-06-11 NOTE — TELEPHONE ENCOUNTER
Routing refill request to provider for review/approval because:  Phyllis given x1 and patient did not follow up, please advise  Rose Marie Segura RN

## 2019-08-19 DIAGNOSIS — Z79.4 TYPE 2 DIABETES MELLITUS WITH HYPERGLYCEMIA, WITH LONG-TERM CURRENT USE OF INSULIN (H): ICD-10-CM

## 2019-08-19 DIAGNOSIS — E11.65 TYPE 2 DIABETES MELLITUS WITH HYPERGLYCEMIA, WITH LONG-TERM CURRENT USE OF INSULIN (H): ICD-10-CM

## 2019-08-19 NOTE — TELEPHONE ENCOUNTER
"Requested Prescriptions   Pending Prescriptions Disp Refills     metFORMIN (GLUCOPHAGE-XR) 500 MG 24 hr tablet 120 tablet 0     Sig: Take 2 tablets (1,000 mg) by mouth 2 times daily (with meals)       Biguanide Agents Failed - 8/19/2019  9:41 AM        Failed - Blood pressure less than 140/90 in past 6 months     BP Readings from Last 3 Encounters:   08/29/18 119/75   07/05/18 138/82   05/30/18 110/70                 Failed - Patient has documented LDL within the past 12 mos.     Recent Labs   Lab Test 05/30/18  1641   LDL 90             Failed - Patient has documented A1c within the specified period of time.     If HgbA1C is 8 or greater, it needs to be on file within the past 3 months.  If less than 8, must be on file within the past 6 months.     Recent Labs   Lab Test 08/29/18  1646   A1C 8.4*             Failed - Patient's CR is NOT>1.4 OR Patient's EGFR is NOT<45 within past 12 mos.     Recent Labs   Lab Test 05/30/18  1641   GFRESTIMATED 83   GFRESTBLACK >90       Recent Labs   Lab Test 05/30/18  1641   CR 1.01             Failed - Recent (6 mo) or future (30 days) visit within the authorizing provider's specialty     Patient had office visit in the last 6 months or has a visit in the next 30 days with authorizing provider or within the authorizing provider's specialty.  See \"Patient Info\" tab in inbasket, or \"Choose Columns\" in Meds & Orders section of the refill encounter.            Passed - Patient has had a Microalbumin in the past 15 mos.     Recent Labs   Lab Test 05/30/18  1646   MICROL 46   UMALCR 15.77             Passed - Patient is age 10 or older        Passed - Patient does NOT have a diagnosis of CHF.        Passed - Medication is active on med list        metFORMIN (GLUCOPHAGE-XR) 500 MG 24 hr tablet  Last Written Prescription Date:  2/13/19  Last Fill Quantity: 120,  # refills: 0   Last office visit: 8/29/2018 with prescribing provider:  Ly Means   Future Office Visit:      "

## 2019-08-20 NOTE — TELEPHONE ENCOUNTER
Patient never received message to schedule.     Please schedule patient.   Needs appointment for further refills.   If patient schedules, maryam will need to come from provider so route to Amna.   Rose Marie Segura RN

## 2019-08-20 NOTE — TELEPHONE ENCOUNTER
This writer attempted to contact pt on 08/20/19      Reason for call schedule OV and left message.      If patient calls back:   Schedule Office Visit appointment within 2 weeks with PCP, document that pt called and route to PCP for possible maryam refill once scheduled.      Maryjane Mayes     abnormal movements.../tremor

## 2019-08-22 NOTE — TELEPHONE ENCOUNTER
This writer attempted to contact pt on 08/22/19      Reason for call schedule OV and left message.      If patient calls back:   Schedule Office Visit appointment within 2 weeks with PCP, document that pt called and route to PCP for possible maryam refill        Maryjane Mayes

## 2019-08-23 RX ORDER — METFORMIN HCL 500 MG
1000 TABLET, EXTENDED RELEASE 24 HR ORAL 2 TIMES DAILY WITH MEALS
Qty: 120 TABLET | Refills: 0 | OUTPATIENT
Start: 2019-08-23

## 2019-08-29 ENCOUNTER — TELEPHONE (OUTPATIENT)
Dept: FAMILY MEDICINE | Facility: CLINIC | Age: 39
End: 2019-08-29

## 2019-08-29 DIAGNOSIS — Z79.4 TYPE 2 DIABETES MELLITUS WITH HYPERGLYCEMIA, WITH LONG-TERM CURRENT USE OF INSULIN (H): ICD-10-CM

## 2019-08-29 DIAGNOSIS — E11.65 TYPE 2 DIABETES MELLITUS WITH HYPERGLYCEMIA, WITH LONG-TERM CURRENT USE OF INSULIN (H): ICD-10-CM

## 2019-08-29 NOTE — TELEPHONE ENCOUNTER
Reason for Call:  Medication or medication refill:    Do you use a [facility]?  Name of the pharmacy and phone number for the current request:  [id number] - [facility] [address] AT [facility]     Name of the medication requested: Pending Prescriptions:                       Disp   Refills    metFORMIN (GLUCOPHAGE-XR) 500 MG 24 hr ta*120 ta*0            Sig: Take 2 tablets (1,000 mg) by mouth 2 times daily           (with meals)    Other request: Made appointment can you refill now. Please call back and advise.    Can we leave a detailed message on this number? YES    Phone number patient can be reached at: Home number on file [phone] (home)    Best Time: any     Call taken on 8/29/2019 at 4:41 PM by Shaniqua Donahue

## 2019-08-30 ENCOUNTER — TELEPHONE (OUTPATIENT)
Dept: FAMILY MEDICINE | Facility: CLINIC | Age: 39
End: 2019-08-30

## 2019-08-30 NOTE — TELEPHONE ENCOUNTER
Panel Management Review   One phone call and send letter if unable to reach them or [employer] message and send letter if not read after 2 weeks (You will get a message to your inbasket)      Visit date not found    Health Maintenance Due   Topic Date Due     PHQ-9  1980     HIV SCREENING  04/24/1995     EYE EXAM  06/29/2017     A1C  02/28/2019     DTAP/TDAP/TD IMMUNIZATION (2 - Td) 03/18/2019     PREVENTIVE CARE VISIT  04/26/2019     DIABETIC FOOT EXAM  04/26/2019     SUNNY ASSESSMENT  04/26/2019     BMP  05/30/2019     LIPID  05/30/2019     MICROALBUMIN  05/30/2019        Fail List measure: DM        Patient is due/failing the following:   A1C    Action needed:   Patient needs office visit for diabetes check.    Type of outreach:    review of chart shows patient has upcoming scheduled appointmet    Questions for provider review:    None                                                                                                                           Yanet Barnes

## 2019-09-04 RX ORDER — METFORMIN HCL 500 MG
1000 TABLET, EXTENDED RELEASE 24 HR ORAL 2 TIMES DAILY WITH MEALS
Qty: 120 TABLET | Refills: 0 | Status: SHIPPED | OUTPATIENT
Start: 2019-09-04 | End: 2019-09-05

## 2019-09-04 NOTE — TELEPHONE ENCOUNTER
Given one month. Please cheyanne and advise that I did not get request until today- I see has appointment with me tomorrow

## 2019-09-04 NOTE — TELEPHONE ENCOUNTER
Routing refill request to provider for review/approval because:  A break in medication, received 30 day on 2/13/19.    Charla Brown RN, [facility]

## 2019-09-05 ENCOUNTER — OFFICE VISIT (OUTPATIENT)
Dept: FAMILY MEDICINE | Facility: CLINIC | Age: 39
End: 2019-09-05
Payer: COMMERCIAL

## 2019-09-05 VITALS
HEIGHT: 72 IN | OXYGEN SATURATION: 99 % | BODY MASS INDEX: 24.79 KG/M2 | WEIGHT: 183 LBS | HEART RATE: 61 BPM | DIASTOLIC BLOOD PRESSURE: 80 MMHG | SYSTOLIC BLOOD PRESSURE: 121 MMHG | TEMPERATURE: 98.1 F

## 2019-09-05 DIAGNOSIS — E87.6 HYPOKALEMIA: Primary | ICD-10-CM

## 2019-09-05 DIAGNOSIS — E11.65 TYPE 2 DIABETES MELLITUS WITH HYPERGLYCEMIA, WITH LONG-TERM CURRENT USE OF INSULIN (H): Primary | ICD-10-CM

## 2019-09-05 DIAGNOSIS — Z79.4 TYPE 2 DIABETES MELLITUS WITH HYPERGLYCEMIA, WITH LONG-TERM CURRENT USE OF INSULIN (H): Primary | ICD-10-CM

## 2019-09-05 LAB
ALBUMIN SERPL-MCNC: 3.8 G/DL (ref 3.4–5)
ALP SERPL-CCNC: 85 U/L (ref 40–150)
ALT SERPL W P-5'-P-CCNC: 32 U/L (ref 0–70)
ANION GAP SERPL CALCULATED.3IONS-SCNC: 6 MMOL/L (ref 3–14)
AST SERPL W P-5'-P-CCNC: 20 U/L (ref 0–45)
BILIRUB SERPL-MCNC: 0.7 MG/DL (ref 0.2–1.3)
BUN SERPL-MCNC: 12 MG/DL (ref 7–30)
CALCIUM SERPL-MCNC: 8.8 MG/DL (ref 8.5–10.1)
CHLORIDE SERPL-SCNC: 103 MMOL/L (ref 94–109)
CHOLEST SERPL-MCNC: 159 MG/DL
CO2 SERPL-SCNC: 30 MMOL/L (ref 20–32)
CREAT SERPL-MCNC: 1.09 MG/DL (ref 0.66–1.25)
CREAT UR-MCNC: 190 MG/DL
GFR SERPL CREATININE-BSD FRML MDRD: 85 ML/MIN/{1.73_M2}
GLUCOSE SERPL-MCNC: 98 MG/DL (ref 70–99)
HBA1C MFR BLD: 8.6 % (ref 0–5.6)
HDLC SERPL-MCNC: 46 MG/DL
LDLC SERPL CALC-MCNC: 101 MG/DL
MICROALBUMIN UR-MCNC: 8 MG/L
MICROALBUMIN/CREAT UR: 4.17 MG/G CR (ref 0–17)
NONHDLC SERPL-MCNC: 113 MG/DL
POTASSIUM SERPL-SCNC: 3.2 MMOL/L (ref 3.4–5.3)
PROT SERPL-MCNC: 6.8 G/DL (ref 6.8–8.8)
SODIUM SERPL-SCNC: 139 MMOL/L (ref 133–144)
TRIGL SERPL-MCNC: 58 MG/DL
TSH SERPL DL<=0.005 MIU/L-ACNC: 1.35 MU/L (ref 0.4–4)

## 2019-09-05 PROCEDURE — 80061 LIPID PANEL: CPT | Performed by: PHYSICIAN ASSISTANT

## 2019-09-05 PROCEDURE — 36415 COLL VENOUS BLD VENIPUNCTURE: CPT | Performed by: PHYSICIAN ASSISTANT

## 2019-09-05 PROCEDURE — 82043 UR ALBUMIN QUANTITATIVE: CPT | Performed by: PHYSICIAN ASSISTANT

## 2019-09-05 PROCEDURE — 84443 ASSAY THYROID STIM HORMONE: CPT | Performed by: PHYSICIAN ASSISTANT

## 2019-09-05 PROCEDURE — 99214 OFFICE O/P EST MOD 30 MIN: CPT | Performed by: PHYSICIAN ASSISTANT

## 2019-09-05 PROCEDURE — 80053 COMPREHEN METABOLIC PANEL: CPT | Performed by: PHYSICIAN ASSISTANT

## 2019-09-05 PROCEDURE — 83036 HEMOGLOBIN GLYCOSYLATED A1C: CPT | Performed by: PHYSICIAN ASSISTANT

## 2019-09-05 RX ORDER — ATORVASTATIN CALCIUM 10 MG/1
10 TABLET, FILM COATED ORAL DAILY
Qty: 90 TABLET | Refills: 0 | Status: SHIPPED | OUTPATIENT
Start: 2019-09-05 | End: 2020-04-21

## 2019-09-05 RX ORDER — POTASSIUM CHLORIDE 750 MG/1
10 TABLET, EXTENDED RELEASE ORAL DAILY
Qty: 90 TABLET | Refills: 0 | Status: SHIPPED | OUTPATIENT
Start: 2019-09-05 | End: 2020-04-23

## 2019-09-05 RX ORDER — LISINOPRIL 2.5 MG/1
2.5 TABLET ORAL DAILY
Qty: 90 TABLET | Refills: 0 | Status: SHIPPED | OUTPATIENT
Start: 2019-09-05 | End: 2020-07-01

## 2019-09-05 RX ORDER — METFORMIN HCL 500 MG
1000 TABLET, EXTENDED RELEASE 24 HR ORAL 2 TIMES DAILY WITH MEALS
Qty: 360 TABLET | Refills: 0 | Status: SHIPPED | OUTPATIENT
Start: 2019-09-05 | End: 2020-12-02

## 2019-09-05 ASSESSMENT — ANXIETY QUESTIONNAIRES
GAD7 TOTAL SCORE: 0
1. FEELING NERVOUS, ANXIOUS, OR ON EDGE: NOT AT ALL
5. BEING SO RESTLESS THAT IT IS HARD TO SIT STILL: NOT AT ALL
IF YOU CHECKED OFF ANY PROBLEMS ON THIS QUESTIONNAIRE, HOW DIFFICULT HAVE THESE PROBLEMS MADE IT FOR YOU TO DO YOUR WORK, TAKE CARE OF THINGS AT HOME, OR GET ALONG WITH OTHER PEOPLE: NOT DIFFICULT AT ALL
7. FEELING AFRAID AS IF SOMETHING AWFUL MIGHT HAPPEN: NOT AT ALL
2. NOT BEING ABLE TO STOP OR CONTROL WORRYING: NOT AT ALL
6. BECOMING EASILY ANNOYED OR IRRITABLE: NOT AT ALL
3. WORRYING TOO MUCH ABOUT DIFFERENT THINGS: NOT AT ALL

## 2019-09-05 ASSESSMENT — PAIN SCALES - GENERAL: PAINLEVEL: NO PAIN (0)

## 2019-09-05 ASSESSMENT — PATIENT HEALTH QUESTIONNAIRE - PHQ9
5. POOR APPETITE OR OVEREATING: NOT AT ALL
SUM OF ALL RESPONSES TO PHQ QUESTIONS 1-9: 0

## 2019-09-05 ASSESSMENT — MIFFLIN-ST. JEOR: SCORE: 1775.14

## 2019-09-05 NOTE — PROGRESS NOTES
Tyson Roberts is a 39 year old male who presents to clinic today for the following health issues:    HPI   Diabetes Follow-up      How often are you checking your blood sugar? A few times a week    What time of day are you checking your blood sugars (select all that apply)?  Before meals    Have you had any blood sugars above 200?  No    Have you had any blood sugars below 70?  No    What symptoms do you notice when your blood sugar is low?  Dizzy    What concerns do you have today about your diabetes? None     Do you have any of these symptoms? (Select all that apply)  No numbness or tingling in feet.  No redness, sores or blisters on feet.  No complaints of excessive thirst.  No reports of blurry vision.  No significant changes to weight.     Have you had a diabetic eye exam in the last 12 months? No    BP Readings from Last 2 Encounters:   09/05/19 121/80   08/29/18 119/75     Hemoglobin A1C (%)   Date Value   09/05/2019 8.6 (H)   08/29/2018 8.4 (H)     LDL Cholesterol Calculated (mg/dL)   Date Value   05/30/2018 90   07/26/2016 111 (H)       Diabetes Management Resources      How many servings of fruits and vegetables do you eat daily?  0-1    On average, how many sweetened beverages do you drink each day (soda, juice, sweet tea, etc)?   2    How many days per week do you miss taking your medication? 0    Hasn't been seen in over a year- reports had some personal stuff going on.   PHQ-9 SCORE 9/5/2019   PHQ-9 Total Score 0     SUNNY-7 SCORE 4/26/2018 9/5/2019   Total Score 1 0       Checks Blood sugars a few days- 101 this am. No lows. Evening in the 100s as well  Feeling well.  I sleep well   Was out of metformin for about a week   Last eye exam for about a year- it's in [place]- reports that they do dilate eyes   No chest pain or shortness of breath.   Feet do itch a bit on bottom improves with powder. No numbness         Patient Active Problem List   Diagnosis     Back pain, chronic      CARDIOVASCULAR SCREENING; LDL GOAL LESS THAN 100     S/P vasectomy     Type 2 diabetes mellitus without complication (H)     Dyshidrotic eczema     Type 2 diabetes mellitus with hyperglycemia, with long-term current use of insulin (H)     Past Surgical History:   Procedure Laterality Date     HERNIA REPAIR, INGUINAL RT/LT  94,97     VASECTOMY  2007       Social History     Tobacco Use     Smoking status: Never Smoker     Smokeless tobacco: Never Used   Substance Use Topics     Alcohol use: Yes     Comment: rarely     Family History   Problem Relation Age of Onset     Diabetes Mother      Leukemia Son 3     Hypertension No family hx of      Thyroid Disease No family hx of      Glaucoma No family hx of      Macular Degeneration No family hx of      Cerebrovascular Disease No family hx of      C.A.D. No family hx of      Colon Cancer No family hx of      Prostate Cancer No family hx of          Current Outpatient Medications   Medication Sig Dispense Refill     atorvastatin (LIPITOR) 10 MG tablet Take 1 tablet (10 mg) by mouth daily 90 tablet 0     blood glucose (NO BRAND SPECIFIED) lancets standard Use to test blood sugar 4 times daily or as directed. 150 each 11     blood glucose (NO BRAND SPECIFIED) test strip Use to test blood sugars 4 times daily or as directed 150 strip 11     blood glucose monitoring (NO BRAND SPECIFIED) meter device kit Use to test blood sugar 4 times daily or as directed. 1 kit 0     insulin detemir (LEVEMIR FLEXPEN/FLEXTOUCH) 100 UNIT/ML pen Inject 20 Units Subcutaneous every morning Take at same time each day. 15 mL 3     insulin pen needle (B-D U/F) 31G X 5 MM miscellaneous Use once daily or as directed. 100 each 11     lisinopril (PRINIVIL/ZESTRIL) 2.5 MG tablet Take 1 tablet (2.5 mg) by mouth daily 90 tablet 0     metFORMIN (GLUCOPHAGE-XR) 500 MG 24 hr tablet Take 2 tablets (1,000 mg) by mouth 2 times daily (with meals) 360 tablet 0     BP Readings from Last 3 Encounters:   09/05/19  "121/80   08/29/18 119/75   07/05/18 138/82    Wt Readings from Last 3 Encounters:   09/05/19 83 kg (183 lb)   08/29/18 83 kg (183 lb)   07/05/18 78.9 kg (174 lb)                      Reviewed and updated as needed this visit by Provider  Tobacco  Allergies  Meds  Problems  Med Hx  Surg Hx  Fam Hx  Soc Hx          Review of Systems   ROS COMP: Constitutional, HEENT, cardiovascular, pulmonary, gi and gu systems are negative, except as otherwise noted.      Objective    /80 (BP Location: Right arm, Patient Position: Chair, Cuff Size: Adult Large)   Pulse 61   Temp 98.1  F (36.7  C) (Oral)   Ht 1.816 m (5' 11.5\")   Wt 83 kg (183 lb)   SpO2 99%   BMI 25.17 kg/m    Body mass index is 25.17 kg/m .  Physical Exam   GENERAL: healthy, alert and no distress  NECK: no adenopathy, no asymmetry, masses, or scars and thyroid normal to palpation  RESP: lungs clear to auscultation - no rales, rhonchi or wheezes  CV: regular rate and rhythm, normal S1 S2, no S3 or S4, no murmur, click or rub, no peripheral edema and peripheral pulses strong  ABDOMEN: soft, nontender, no hepatosplenomegaly, no masses and bowel sounds normal  MS: no gross musculoskeletal defects noted, no edema  Diabetic foot exam: normal DP and PT pulses, no  ulcerative lesions, normal sensory exam, normal monofilament exam, except for findings noted on diabetic foot graphical image.      Decreased sensation at 1,2, 3, 4 of both feet and there is callous diffusely     and tinea pedis    Diagnostic Test Results:  Results for orders placed or performed in visit on 09/05/19 (from the past 24 hour(s))   Hemoglobin A1c   Result Value Ref Range    Hemoglobin A1C 8.6 (H) 0 - 5.6 %           Assessment & Plan     1. Type 2 diabetes mellitus with hyperglycemia, with long-term current use of insulin (H)  A1C 8.6- not at goal.  Doesn't really correlate with his blood sugar readings.  Will see on metabolic panel today- no adjustments in med made.  Encouraged " "follow up with diabetes educator and follow up with us in 3 months- perhaps blood sugars up because had missed some of his metformin     - TSH with free T4 reflex  - Lipid panel reflex to direct LDL Fasting  - Comprehensive metabolic panel  - Albumin Random Urine Quantitative with Creat Ratio  - Hemoglobin A1c  - blood glucose (NO BRAND SPECIFIED) lancets standard; Use to test blood sugar 4 times daily or as directed.  Dispense: 150 each; Refill: 11  - blood glucose monitoring (NO BRAND SPECIFIED) meter device kit; Use to test blood sugar 4 times daily or as directed.  Dispense: 1 kit; Refill: 0  - blood glucose (NO BRAND SPECIFIED) test strip; Use to test blood sugars 4 times daily or as directed  Dispense: 150 strip; Refill: 11  - insulin detemir (LEVEMIR FLEXPEN/FLEXTOUCH) 100 UNIT/ML pen; Inject 20 Units Subcutaneous every morning Take at same time each day.  Dispense: 15 mL; Refill: 3  - insulin pen needle (B-D U/F) 31G X 5 MM miscellaneous; Use once daily or as directed.  Dispense: 100 each; Refill: 11  - lisinopril (PRINIVIL/ZESTRIL) 2.5 MG tablet; Take 1 tablet (2.5 mg) by mouth daily  Dispense: 90 tablet; Refill: 0  - metFORMIN (GLUCOPHAGE-XR) 500 MG 24 hr tablet; Take 2 tablets (1,000 mg) by mouth 2 times daily (with meals)  Dispense: 360 tablet; Refill: 0  - atorvastatin (LIPITOR) 10 MG tablet; Take 1 tablet (10 mg) by mouth daily  Dispense: 90 tablet; Refill: 0  - DIABETES EDUCATOR REFERRAL           BMI:   Estimated body mass index is 25.17 kg/m  as calculated from the following:    Height as of this encounter: 1.816 m (5' 11.5\").    Weight as of this encounter: 83 kg (183 lb).   Weight management plan: Discussed healthy diet and exercise guidelines        Patient Instructions     Schedule an eye exam as soon as possible and have provider send us results.   Follow up with diabetes educator at [facility] to get A1C in control   Follow up with us in 3 months   We will send a letter with lab " results.  Over the counter lamisil cream for itching of feet    Patient Education       At [facility], we strive to deliver an exceptional experience to you, every time we see you.  If you receive a survey in the mail, please send us back your thoughts. We really do value your feedback.    Based on your medical history, these are the current health maintenance/preventive care services that you are due for (some may have been done at this visit.)  Health Maintenance Due   Topic Date Due     PHQ-9  1980     HIV SCREENING  04/24/1995     EYE EXAM  06/29/2017     A1C  02/28/2019     DTAP/TDAP/TD IMMUNIZATION (2 - Td) 03/18/2019     PREVENTIVE CARE VISIT  04/26/2019     DIABETIC FOOT EXAM  04/26/2019     SUNNY ASSESSMENT  04/26/2019     BMP  05/30/2019     LIPID  05/30/2019     MICROALBUMIN  05/30/2019     INFLUENZA VACCINE (1) 09/01/2019         Suggested websites for health information:  Www.[employer] Dharma.Ixtens : Up to date and easily searchable information on multiple topics.  Www.medlineplus.gov : medication info, interactive tutorials, watch real surgeries online  Www.familydoctor.org : good info from the Academy of Family Physicians  Www.cdc.gov : public health info, travel advisories, epidemics (H1N1)  Www.aap.org : children's health info, normal development, vaccinations  Www.health.state[place] : [place] dept of health, public health issues in [place], N1N1    Your care team:                            Family Medicine Internal Medicine   MD Kemar Nguyen MD Shantel Branch-Fleming, MD Katya Georgiev PA-C Nam Ho, MD Pediatrics   PATRICK Rodgers CNP Amelia Massimini APRN CNP Shaista Malik, MD Bethany Templen, MD Deborah Mielke, MD Kim Thein, APRN CNP      Clinic hours: Monday - Thursday 7 am-7 pm; Fridays 7 am-5 pm.   Urgent care: Monday - Friday 11 am-9 pm; Saturday and Sunday 9 am-5 pm.  Pharmacy : Monday -Thursday 8 am-8 pm; Friday 8 am-6 pm; Saturday and  Sunday 9 am-5 pm.     Clinic: [phone]   Pharmacy: [phone]        Return in about 3 months (around 12/5/2019), or if symptoms worsen or fail to improve.    Ly Means PA-C  [facility]

## 2019-09-05 NOTE — PATIENT INSTRUCTIONS
Schedule an eye exam as soon as possible and have provider send us results.   Follow up with diabetes educator at [facility] to get A1C in control   Follow up with us in 3 months   We will send a letter with lab results.  Over the counter lamisil cream for itching of feet    Patient Education       At [facility], we strive to deliver an exceptional experience to you, every time we see you.  If you receive a survey in the mail, please send us back your thoughts. We really do value your feedback.    Based on your medical history, these are the current health maintenance/preventive care services that you are due for (some may have been done at this visit.)  Health Maintenance Due   Topic Date Due     PHQ-9  1980     HIV SCREENING  04/24/1995     EYE EXAM  06/29/2017     A1C  02/28/2019     DTAP/TDAP/TD IMMUNIZATION (2 - Td) 03/18/2019     PREVENTIVE CARE VISIT  04/26/2019     DIABETIC FOOT EXAM  04/26/2019     SUNNY ASSESSMENT  04/26/2019     BMP  05/30/2019     LIPID  05/30/2019     MICROALBUMIN  05/30/2019     INFLUENZA VACCINE (1) 09/01/2019         Suggested websites for health information:  Www.Znapshop : Up to date and easily searchable information on multiple topics.  Www.medlineplus.gov : medication info, interactive tutorials, watch real surgeries online  Www.familydoctor.org : good info from the Academy of Family Physicians  Www.cdc.gov : public health info, travel advisories, epidemics (H1N1)  Www.aap.org : children's health info, normal development, vaccinations  Www.health.[facility].[place].us : [place] dept of health, public health issues in [place], N1N1    Your care team:                            Family Medicine Internal Medicine   MD Kemar Nguyen MD Shantel Branch-Fleming, MD Katya Georgiev PA-C Nam Ho, MD Pediatrics   Kristopher Bowers, PATRICK Haji, JOSE ALEJANDRO Soni APRMD Ita Prajapati CNP, MD Deborah Mielke, MD Kim  ÁLVARO Edward CNP      Clinic hours: Monday - Thursday 7 am-7 pm; Fridays 7 am-5 pm.   Urgent care: Monday - Friday 11 am-9 pm; Saturday and Sunday 9 am-5 pm.  Pharmacy : Monday -Thursday 8 am-8 pm; Friday 8 am-6 pm; Saturday and Sunday 9 am-5 pm.     Clinic: [phone]   Pharmacy: [phone]

## 2019-09-05 NOTE — RESULT ENCOUNTER NOTE
"Please call and advise that potassium was a bit low. I have sent over prescription for potassium supplement.  Start taking potassium 10 meq daily and schedule lab only appointment in approximately one week to recheck.     Blood sugar was 98- so I'm not sure why A1C was high- recheck in 3 months   Thyroid function was normal   Urine does not show excess protein.  Thyroid function was normal.   Liver and kidney function were normal.   Cholesterol was good.     Please also advise as below and send letter with below.    The normal adult diet usually contains 50-100mEq of potassium per day. More potassium is needed when there is excess loss of potassium from the body. Diuretic medicines (\"water pills\") or prolonged diarrhea and vomiting can cause this. To increase the amount of potassium in your diet, include these foods.         [The (*) indicates highest potassium.]   VEGETABLES   Artichokes - cooked 1/2 cup 400-600 mg (10-15mEq) *   Asparagus - fresh or frozen 1/2 cup 200-400 mg (5-10mEq)   Beans, white, red, griffin 1/2 cup 400-600 mg (10-15mEq) *   Beets - fresh or cooked 1/2 cup 200-400 mg (5-10mEq)   Broccoli - cooked, fresh or frozen 1/2 cup 200-400 mg (5-10mEq)   Brussel sprouts - fresh or frozen 1/2 cup 200-400 mg (5-10mEq)   Cabbage, raw 1 cup 200-400 mg (5-10mEq)   Carrots - fresh, raw or cooked 1/2 cup 200-400 mg (5-10mEq)   Celery - raw 1 cup 200-400 mg (5-10mEq)   Eggplant - baked 1/2 cup 200-400 mg (5-10mEq)   [place] Beans - fresh or frozen 1/2 cup 200-400 mg (5-10mEq)   Mushrooms - fresh, cooked 1/2 cup 200-400 mg (5-10mEq)   Peas - cooked 1/2 cup 200-400 mg (5-10mEq)   Potatoes - baked 1/2 cup 400-600 mg (10-15mEq) *   Spinach - raw, chopped 2 cups 400-600 mg (10-15mEq) *   Spinach - whole leaves 3 cups 400-600 mg (10-15mEq) *   Squash, winter - fresh, frozen, cooked 1/2 cup 200-400 mg (5-10mEq)   Tomato - fresh 1 med 200-400 mg (5-10mEq)   Tomato juice - canned 1/2 cup 200-400 mg (5-10mEq)   FRUITS   Apple " juice - fresh or canned, unsweetened 1 cup 200-400 mg (5-10mEq)   Apricots - fresh 2 med 200-400 mg (5-10mEq)   Apricots - canned 1/2 cup 200-400 mg (5-10mEq)   Apricots - dried 4 pieces 200-400 mg (5-10mEq)   Banana - fresh 1 small 200-400 mg (5-10mEq)   Blackberries - fresh or frozen 1 cup 200-400 mg (5-10mEq)   Cantaloupe - fresh 6 inch diameter 400-600 mg (10-15mEq) *   Grape juice, unsweetened 1 cup 200-400 mg (5-10mEq)   Honeydew melon - fresh 7 inch diameter 400-600 mg (10-15mEq) *   Orange juice, unsweetened, fresh or frozen 1/2 cup 200-400 mg (5-10mEq)   Orange - fresh 1 med 200-400 mg (5-10mEq)   Mandarin - fresh 1 med 200-400 mg (5-10mEq)   Pineapple juice - unsweetened, canned/frozen 1 cup 200-400 mg (5-10mEq)   Prune juice - unsweetened, canned 1/2 cup 200-400 mg (5-10mEq)   Prunes - dried 8 pieces 200-400 mg (5-10mEq)   Raspberries - fresh or frozen 1 cup 200-400 mg (5-10mEq)   Strawberries - fresh or frozen 1 cup 200-400 mg (5-10mEq)   Tangerine - fresh 1 med 200-400 mg (5-10mEq)   MEAT   Red Meat - cooked 3 oz 200-400 mg (5-10mEq)   Shrimp - fresh or cooked 3/4 cup 200-400 mg (5-10mEq)   Tuna - fresh or canned 3/4 cup 200-400 mg (5-10mEq)   Cod, Flounder, Halibut - cooked 3.5 oz 400-600 mg (10-15mEq) *   [place] - fresh or cooked 3.5 oz 400-600 mg (10-15mEq) *   Scallops - cooked 3.5 oz 400-600 mg (10-15mEq) *   [Modified from Diet Manual, Clinical Center, National Institutes of Health]     1450-9866 Michell Mcgovern, [address] [place] [address], [place] [address]. All rights reserved. This information is not intended as a substitute for professional medical care. Always follow your healthcare professional's instructions

## 2019-09-06 ENCOUNTER — TELEPHONE (OUTPATIENT)
Dept: FAMILY MEDICINE | Facility: CLINIC | Age: 39
End: 2019-09-06

## 2019-09-06 ASSESSMENT — ANXIETY QUESTIONNAIRES: GAD7 TOTAL SCORE: 0

## 2019-09-06 NOTE — LETTER
"[address]  [address]  [phone]    September 6, 2019      David Roberts  [address]   [facility] [address]      Dear David,      The normal adult diet usually contains 50-100mEq of potassium per day. More potassium is needed when there is excess loss of potassium from the body. Diuretic medicines (\"water pills\") or prolonged diarrhea and vomiting can cause this. To increase the amount of potassium in your diet, include these foods.         [The (*) indicates highest potassium.]   VEGETABLES   Artichokes - cooked 1/2 cup 400-600 mg (10-15mEq) *   Asparagus - fresh or frozen 1/2 cup 200-400 mg (5-10mEq)   Beans, white, red, griffin 1/2 cup 400-600 mg (10-15mEq) *   Beets - fresh or cooked 1/2 cup 200-400 mg (5-10mEq)   Broccoli - cooked, fresh or frozen 1/2 cup 200-400 mg (5-10mEq)   Brussel sprouts - fresh or frozen 1/2 cup 200-400 mg (5-10mEq)   Cabbage, raw 1 cup 200-400 mg (5-10mEq)   Carrots - fresh, raw or cooked 1/2 cup 200-400 mg (5-10mEq)   Celery - raw 1 cup 200-400 mg (5-10mEq)   Eggplant - baked 1/2 cup 200-400 mg (5-10mEq)   [place]arrow Beans - fresh or frozen 1/2 cup 200-400 mg (5-10mEq)   Mushrooms - fresh, cooked 1/2 cup 200-400 mg (5-10mEq)   Peas - cooked 1/2 cup 200-400 mg (5-10mEq)   Potatoes - baked 1/2 cup 400-600 mg (10-15mEq) *   Spinach - raw, chopped 2 cups 400-600 mg (10-15mEq) *   Spinach - whole leaves 3 cups 400-600 mg (10-15mEq) *   Squash, winter - fresh, frozen, cooked 1/2 cup 200-400 mg (5-10mEq)   Tomato - fresh 1 med 200-400 mg (5-10mEq)   Tomato juice - canned 1/2 cup 200-400 mg (5-10mEq)   FRUITS   Apple juice - fresh or canned, unsweetened 1 cup 200-400 mg (5-10mEq)   Apricots - fresh 2 med 200-400 mg (5-10mEq)   Apricots - canned 1/2 cup 200-400 mg (5-10mEq)   Apricots - dried 4 pieces 200-400 mg (5-10mEq)   Banana - fresh 1 small 200-400 mg (5-10mEq)   Blackberries - fresh or frozen 1 cup 200-400 mg " (5-10mEq)   Cantaloupe - fresh 6 inch diameter 400-600 mg (10-15mEq) *   Grape juice, unsweetened 1 cup 200-400 mg (5-10mEq)   Honeydew melon - fresh 7 inch diameter 400-600 mg (10-15mEq) *   Orange juice, unsweetened, fresh or frozen 1/2 cup 200-400 mg (5-10mEq)   Orange - fresh 1 med 200-400 mg (5-10mEq)   Mandarin - fresh 1 med 200-400 mg (5-10mEq)   Pineapple juice - unsweetened, canned/frozen 1 cup 200-400 mg (5-10mEq)   Prune juice - unsweetened, canned 1/2 cup 200-400 mg (5-10mEq)   Prunes - dried 8 pieces 200-400 mg (5-10mEq)   Raspberries - fresh or frozen 1 cup 200-400 mg (5-10mEq)   Strawberries - fresh or frozen 1 cup 200-400 mg (5-10mEq)   Tangerine - fresh 1 med 200-400 mg (5-10mEq)   MEAT   Red Meat - cooked 3 oz 200-400 mg (5-10mEq)   Shrimp - fresh or cooked 3/4 cup 200-400 mg (5-10mEq)   Tuna - fresh or canned 3/4 cup 200-400 mg (5-10mEq)   Cod, Flounder, Halibut - cooked 3.5 oz 400-600 mg (10-15mEq) *   [place] - fresh or cooked 3.5 oz 400-600 mg (10-15mEq) *   Scallops - cooked 3.5 oz 400-600 mg (10-15mEq) *   [Modified from Diet Manual, Clinical Center, National Institutes of Health]     [id number] [facility], [address] [place] [address], [place] [address]. All rights reserved. This information is not intended as a substitute for professional medical care. Always follow your healthcare professional's instructions

## 2019-09-06 NOTE — TELEPHONE ENCOUNTER
"RNs to call patient again.    Team please send letter with information as requested by Provider.    This writer attempted to contact [facility] on 09/06/19      Reason for call results and left message.      If patient calls back:   Registered Nurse called. Follow Triage Call workflow        Tati Romo, RN         Notes recorded by Ly Means PA-C on 9/5/2019 at 5:22 PM CDT  Please call and advise that potassium was a bit low. I have sent over prescription for potassium supplement.  Start taking potassium 10 meq daily and schedule lab only appointment in approximately one week to recheck.     Blood sugar was 98- so I'm not sure why A1C was high- recheck in 3 months   Thyroid function was normal   Urine does not show excess protein.  Thyroid function was normal.   Liver and kidney function were normal.   Cholesterol was good.     Please also advise as below and send letter with below.    The normal adult diet usually contains 50-100mEq of potassium per day. More potassium is needed when there is excess loss of potassium from the body. Diuretic medicines (\"water pills\") or prolonged diarrhea and vomiting can cause this. To increase the amount of potassium in your diet, include these foods.         [The (*) indicates highest potassium.]   VEGETABLES   Artichokes - cooked 1/2 cup 400-600 mg (10-15mEq) *   Asparagus - fresh or frozen 1/2 cup 200-400 mg (5-10mEq)   Beans, white, red, griffin 1/2 cup 400-600 mg (10-15mEq) *   Beets - fresh or cooked 1/2 cup 200-400 mg (5-10mEq)   Broccoli - cooked, fresh or frozen 1/2 cup 200-400 mg (5-10mEq)   Brussel sprouts - fresh or frozen 1/2 cup 200-400 mg (5-10mEq)   Cabbage, raw 1 cup 200-400 mg (5-10mEq)   Carrots - fresh, raw or cooked 1/2 cup 200-400 mg (5-10mEq)   Celery - raw 1 cup 200-400 mg (5-10mEq)   Eggplant - baked 1/2 cup 200-400 mg (5-10mEq)   Barrow Beans - fresh or frozen 1/2 cup 200-400 mg (5-10mEq)   Mushrooms - fresh, cooked 1/2 cup 200-400 mg " (5-10mEq)   Peas - cooked 1/2 cup 200-400 mg (5-10mEq)   Potatoes - baked 1/2 cup 400-600 mg (10-15mEq) *   Spinach - raw, chopped 2 cups 400-600 mg (10-15mEq) *   Spinach - whole leaves 3 cups 400-600 mg (10-15mEq) *   Squash, winter - fresh, frozen, cooked 1/2 cup 200-400 mg (5-10mEq)   Tomato - fresh 1 med 200-400 mg (5-10mEq)   Tomato juice - canned 1/2 cup 200-400 mg (5-10mEq)   FRUITS   Apple juice - fresh or canned, unsweetened 1 cup 200-400 mg (5-10mEq)   Apricots - fresh 2 med 200-400 mg (5-10mEq)   Apricots - canned 1/2 cup 200-400 mg (5-10mEq)   Apricots - dried 4 pieces 200-400 mg (5-10mEq)   Banana - fresh 1 small 200-400 mg (5-10mEq)   Blackberries - fresh or frozen 1 cup 200-400 mg (5-10mEq)   Cantaloupe - fresh 6 inch diameter 400-600 mg (10-15mEq) *   Grape juice, unsweetened 1 cup 200-400 mg (5-10mEq)   Honeydew melon - fresh 7 inch diameter 400-600 mg (10-15mEq) *   Orange juice, unsweetened, fresh or frozen 1/2 cup 200-400 mg (5-10mEq)   Orange - fresh 1 med 200-400 mg (5-10mEq)   Mandarin - fresh 1 med 200-400 mg (5-10mEq)   Pineapple juice - unsweetened, canned/frozen 1 cup 200-400 mg (5-10mEq)   Prune juice - unsweetened, canned 1/2 cup 200-400 mg (5-10mEq)   Prunes - dried 8 pieces 200-400 mg (5-10mEq)   Raspberries - fresh or frozen 1 cup 200-400 mg (5-10mEq)   Strawberries - fresh or frozen 1 cup 200-400 mg (5-10mEq)   Tangerine - fresh 1 med 200-400 mg (5-10mEq)   MEAT   Red Meat - cooked 3 oz 200-400 mg (5-10mEq)   Shrimp - fresh or cooked 3/4 cup 200-400 mg (5-10mEq)   Tuna - fresh or canned 3/4 cup 200-400 mg (5-10mEq)   Cod, Flounder, Halibut - cooked 3.5 oz 400-600 mg (10-15mEq) *   [place] - fresh or cooked 3.5 oz 400-600 mg (10-15mEq) *   Scallops - cooked 3.5 oz 400-600 mg (10-15mEq) *   [Modified from Diet Manual, Clinical Center, National Institutes of Health]     [id number] Michell Mcgovern, [address] [place] [address], [place] [address]. All rights reserved. This information is not  intended as a substitute for professional medical care. Always follow your healthcare professional's instructions

## 2019-10-21 DIAGNOSIS — E11.65 TYPE 2 DIABETES MELLITUS WITH HYPERGLYCEMIA, WITH LONG-TERM CURRENT USE OF INSULIN (H): ICD-10-CM

## 2019-10-21 DIAGNOSIS — Z79.4 TYPE 2 DIABETES MELLITUS WITH HYPERGLYCEMIA, WITH LONG-TERM CURRENT USE OF INSULIN (H): ICD-10-CM

## 2019-10-21 NOTE — TELEPHONE ENCOUNTER
"Requested Prescriptions   Pending Prescriptions Disp Refills     insulin detemir (LEVEMIR FLEXPEN/FLEXTOUCH) 100 UNIT/ML pen 15 mL 3     Sig: Inject 20 Units Subcutaneous every morning Take at same time each day.       Long Acting Insulin Protocol Passed - 10/21/2019  8:42 AM        Passed - Blood pressure less than 140/90 in past 6 months     BP Readings from Last 3 Encounters:   09/05/19 121/80   08/29/18 119/75   07/05/18 138/82                 Passed - LDL on file in past 12 months     Recent Labs   Lab Test 09/05/19  1004   *             Passed - Microalbumin on file in past 12 months     Recent Labs   Lab Test 09/05/19  1004   MICROL 8   UMALCR 4.17             Passed - Serum creatinine on file in past 12 months     Recent Labs   Lab Test 09/05/19  1004   CR 1.09             Passed - HgbA1C in past 3 or 6 months     If HgbA1C is 8 or greater, it needs to be on file within the past 3 months.  If less than 8, must be on file within the past 6 months.     Recent Labs   Lab Test 09/05/19  1004   A1C 8.6*             Passed - Medication is active on med list        Passed - Patient is age 18 or older        Passed - Recent (6 mo) or future (30 days) visit within the authorizing provider's specialty     Patient had office visit in the last 6 months or has a visit in the next 30 days with authorizing provider or within the authorizing provider's specialty.  See \"Patient Info\" tab in inbasket, or \"Choose Columns\" in Meds & Orders section of the refill encounter.            insulin detemir (LEVEMIR FLEXPEN/FLEXTOUCH) 100 UNIT/ML pen  Last Written Prescription Date:  9/5/19  Last Fill Quantity: 15ml,  # refills: 3   Last office visit: 9/5/2019 with prescribing provider:  Ly Means   Future Office Visit:      "

## 2020-04-16 ENCOUNTER — TELEPHONE (OUTPATIENT)
Dept: FAMILY MEDICINE | Facility: CLINIC | Age: 40
End: 2020-04-16

## 2020-04-16 DIAGNOSIS — E11.65 TYPE 2 DIABETES MELLITUS WITH HYPERGLYCEMIA, WITH LONG-TERM CURRENT USE OF INSULIN (H): ICD-10-CM

## 2020-04-16 DIAGNOSIS — Z79.4 TYPE 2 DIABETES MELLITUS WITH HYPERGLYCEMIA, WITH LONG-TERM CURRENT USE OF INSULIN (H): ICD-10-CM

## 2020-04-16 NOTE — LETTER
[address]  [address]  [phone]    April 23, 2020      David Roberts  [address]   [facility] [address]      Hi David      We have refilled your lipitor.   We will need to see you for a virtual visit and fasting labs before any additional refills can be given.  Please call [phone] to schedule this appointment.      Thank you,    [facility]

## 2020-04-20 ENCOUNTER — TELEPHONE (OUTPATIENT)
Dept: FAMILY MEDICINE | Facility: CLINIC | Age: 40
End: 2020-04-20

## 2020-04-20 DIAGNOSIS — E87.6 HYPOKALEMIA: ICD-10-CM

## 2020-04-20 NOTE — TELEPHONE ENCOUNTER
"Requested Prescriptions   Pending Prescriptions Disp Refills     potassium chloride ER (K-TAB/KLOR-CON) 10 MEQ CR tablet 90 tablet 0     Sig: Take 1 tablet (10 mEq) by mouth daily       Potassium Supplements Protocol Failed - 4/20/2020 10:49 AM        Failed - Normal serum potassium in past 12 months     Recent Labs   Lab Test 09/05/19  1004   POTASSIUM 3.2*                    Passed - Recent (12 mo) or future (30 days) visit within the authorizing provider's department     Patient has had an office visit with the authorizing provider or a provider within the authorizing providers department within the previous 12 mos or has a future within next 30 days. See \"Patient Info\" tab in inbasket, or \"Choose Columns\" in Meds & Orders section of the refill encounter.              Passed - Medication is active on med list        Passed - Patient is age 18 or older           potassium chloride ER (K-TAB/KLOR-CON) 10 MEQ CR tablet  Last Written Prescription Date:  9/5/19  Last Fill Quantity: 90,  # refills: 0   Last office visit: 9/5/2019 with prescribing provider:  Ly Means   Future Office Visit:      "

## 2020-04-21 RX ORDER — ATORVASTATIN CALCIUM 10 MG/1
TABLET, FILM COATED ORAL
Qty: 90 TABLET | Refills: 0 | Status: SHIPPED | OUTPATIENT
Start: 2020-04-21 | End: 2020-12-04

## 2020-04-23 RX ORDER — POTASSIUM CHLORIDE 750 MG/1
10 TABLET, EXTENDED RELEASE ORAL DAILY
Qty: 30 TABLET | Refills: 0 | Status: SHIPPED | OUTPATIENT
Start: 2020-04-23 | End: 2020-06-10

## 2020-04-23 NOTE — TELEPHONE ENCOUNTER
Routing refill request to provider for review/approval because:  Labs not current:  Creatinine    Charla Brown RN, [facility] Triage

## 2020-04-23 NOTE — TELEPHONE ENCOUNTER
Given one month- no further refills without labs and follow up with us - please assist with scheduling - was sent letter 4/16/2020

## 2020-06-08 ENCOUNTER — TELEPHONE (OUTPATIENT)
Dept: FAMILY MEDICINE | Facility: CLINIC | Age: 40
End: 2020-06-08

## 2020-06-08 DIAGNOSIS — E87.6 HYPOKALEMIA: ICD-10-CM

## 2020-06-08 NOTE — TELEPHONE ENCOUNTER
"Requested Prescriptions   Pending Prescriptions Disp Refills     potassium chloride ER (K-TAB/KLOR-CON) 10 MEQ CR tablet 30 tablet 0     Sig: Take 1 tablet (10 mEq) by mouth daily +++NO FURTHER REFILLS without FOLLOW UP WITH US+++       Potassium Supplements Protocol Failed - 6/8/2020  9:36 AM        Failed - Normal serum potassium in past 12 months     Recent Labs   Lab Test 09/05/19  1004   POTASSIUM 3.2*                    Passed - Recent (12 mo) or future (30 days) visit within the authorizing provider's department     Patient has had an office visit with the authorizing provider or a provider within the authorizing providers department within the previous 12 mos or has a future within next 30 days. See \"Patient Info\" tab in inbasket, or \"Choose Columns\" in Meds & Orders section of the refill encounter.              Passed - Medication is active on med list        Passed - Patient is age 18 or older           potassium chloride ER (K-TAB/KLOR-CON) 10 MEQ CR tablet  Last Written Prescription Date:  4/23/2020  Last Fill Quantity: 30,  # refills: 0   Last office visit: 9/5/2019 with prescribing provider:  Ly Means   Future Office Visit:            "

## 2020-06-08 NOTE — LETTER
[address]  [address]  [phone]    Ophelia 10, 2020      David Roberts  [address]   [facility] [address]      Dear David,  We have refilled your potassium chloride for 15 days.   We will need to see you for a virtual visit and labs before any additional refills can be given.  Please call [phone] to schedule this appointment.      Thank you,    [facility]

## 2020-06-09 NOTE — TELEPHONE ENCOUNTER
Routing refill request to provider for review/approval because:  Phyllis given x1 and patient did not follow up, please advise  Rose Marie Segura RN  [facility]

## 2020-06-10 RX ORDER — POTASSIUM CHLORIDE 750 MG/1
10 TABLET, EXTENDED RELEASE ORAL DAILY
Qty: 15 TABLET | Refills: 0 | Status: SHIPPED | OUTPATIENT
Start: 2020-06-10 | End: 2020-07-01

## 2020-06-10 NOTE — TELEPHONE ENCOUNTER
Given 15 tablets - assist with scheduling lab only appointment as well as virtual visit   No further refills without follow up

## 2020-06-10 NOTE — TELEPHONE ENCOUNTER
Reminder letter to schedule needed appt for further refills mailed to pt's home address.      Maryjane GOULD (R))

## 2020-06-30 ENCOUNTER — TELEPHONE (OUTPATIENT)
Dept: FAMILY MEDICINE | Facility: CLINIC | Age: 40
End: 2020-06-30

## 2020-06-30 DIAGNOSIS — E11.65 TYPE 2 DIABETES MELLITUS WITH HYPERGLYCEMIA, WITH LONG-TERM CURRENT USE OF INSULIN (H): ICD-10-CM

## 2020-06-30 DIAGNOSIS — Z79.4 TYPE 2 DIABETES MELLITUS WITH HYPERGLYCEMIA, WITH LONG-TERM CURRENT USE OF INSULIN (H): ICD-10-CM

## 2020-06-30 NOTE — LETTER
July 7, 2020      David Roberts  [address]   [facility] [address]        Dear David,     Please schedule a fasting lab appointment ( nothing to eat or drink for 9 hours except water and/or medications), medical assistant visit (to check your blood pressure)  and a virtual visit with me a few days later.  We have been attempting to reach you by phone. We cannot refill your medications without scheduled appointments.   Please call or MyChart my office with any questions or concerns.      Sincerely,        Ly Means PA-C

## 2020-07-02 RX ORDER — LISINOPRIL 2.5 MG/1
2.5 TABLET ORAL DAILY
Qty: 10 TABLET | Refills: 0 | Status: SHIPPED | OUTPATIENT
Start: 2020-07-02 | End: 2020-12-02

## 2020-07-02 NOTE — TELEPHONE ENCOUNTER
Gabriela Blanton MA    Medical Assistant       Telephone Encounter    Signed    Encounter Date:  6/29/2020                     []Hide copied text    []Hover for details  This writer attempted to contact Patient on 07/02/20        Reason for call needs visits-10 tablets given  and left message.        If patient calls back:              Schedule Needs a Fasting lab only appt and a few days later a Virtual  appointment within 2 weeks. Postponing message.           Gabriela Blanton MA

## 2020-07-02 NOTE — TELEPHONE ENCOUNTER
Given 10 tablets - assist with scheduling labs and virtual visit with me - no refills without follow up with us

## 2020-07-02 NOTE — TELEPHONE ENCOUNTER
Routing refill request to provider for review/approval because:  Labs not current:  Potassium    Charla Brown RN, [facility] Triage

## 2020-07-06 NOTE — TELEPHONE ENCOUNTER
This writer attempted to contact pt on 07/06/20      Reason for call schedule labs and virtual visit and left message.      If patient calls back:   Schedule Lab and virtual appointment within 2 weeks with primary care and lab, document that pt called and close encounter         Minal Giron MA

## 2020-07-08 DIAGNOSIS — E87.6 HYPOKALEMIA: ICD-10-CM

## 2020-07-09 NOTE — TELEPHONE ENCOUNTER
Routing refill request to provider for review/approval because:  Phyllis given x1 and patient did not follow up, please advise    Tati Romo RN

## 2020-07-10 RX ORDER — POTASSIUM CHLORIDE 750 MG/1
10 TABLET, EXTENDED RELEASE ORAL DAILY
Qty: 10 TABLET | Refills: 0 | Status: SHIPPED | OUTPATIENT
Start: 2020-07-10 | End: 2020-12-02

## 2020-07-10 NOTE — TELEPHONE ENCOUNTER
Letter was sent 7/7/2020- given 10 tablets -no further refills without follow up - please call and assist with scheduling lab, MA blood pressure check and virtual visit

## 2020-07-10 NOTE — TELEPHONE ENCOUNTER
This writer attempted to contact pt on 07/10/20      Reason for call scheduling lab, MA blood pressure check and virtual visit and left message.      If patient calls back:   Schedule virtual visit appointment within 1 month with PCP, also schedule lab and BP check , document that pt called and close encounter         Maryjane Mayes

## 2020-07-14 NOTE — TELEPHONE ENCOUNTER
This writer attempted to contact pt on 07/14/20      Reason for call schedule MA BP check and virtual visit and left message.      If patient calls back:   Schedule MA BP appointment and virtual visit within 2 weeks with primary care and ancillary, document that pt called and close encounter         Minal Giron MA

## 2020-08-01 ENCOUNTER — TELEPHONE (OUTPATIENT)
Dept: FAMILY MEDICINE | Facility: CLINIC | Age: 40
End: 2020-08-01

## 2020-08-01 DIAGNOSIS — Z79.4 TYPE 2 DIABETES MELLITUS WITH HYPERGLYCEMIA, WITH LONG-TERM CURRENT USE OF INSULIN (H): ICD-10-CM

## 2020-08-01 DIAGNOSIS — E11.65 TYPE 2 DIABETES MELLITUS WITH HYPERGLYCEMIA, WITH LONG-TERM CURRENT USE OF INSULIN (H): ICD-10-CM

## 2020-08-01 NOTE — LETTER
August 7, 2020      David Roberts  [address]   [place] [facility] [address]        Dear David,     We have been attempting to reach you by phone.  We are unable to refill your medications without a face to face visit and labs.  Please schedule at your earliest convenience.  If you are getting care elsewhere please let us know.   Please call or MyChart my office with any questions or concerns.        Sincerely,        Ly Means PA-C

## 2020-08-04 RX ORDER — METFORMIN HCL 500 MG
1000 TABLET, EXTENDED RELEASE 24 HR ORAL 2 TIMES DAILY WITH MEALS
Qty: 360 TABLET | Refills: 0 | OUTPATIENT
Start: 2020-08-04

## 2020-08-04 NOTE — TELEPHONE ENCOUNTER
Needs face to face appointment -not prescribed since 9/2019- refill declined.  Please call and inform -may be seeing a different provider

## 2020-08-04 NOTE — TELEPHONE ENCOUNTER
This writer attempted to contact pt on 08/04/20      Reason for call schedule virtual appt for refill and left message.      If patient calls back:   Schedule virtual appointment within 2 weeks with PCP, document that pt called and close encounter         Minal Giron MA

## 2020-08-06 NOTE — TELEPHONE ENCOUNTER
This writer attempted to contact pt on 08/06/20        Reason for call schedule virtual appt for refill and left message.        If patient calls back:              Schedule virtual appointment within 2 weeks with PCP, document that pt called and close encounter            Minal Giron MA

## 2020-09-10 DIAGNOSIS — Z79.4 TYPE 2 DIABETES MELLITUS WITH HYPERGLYCEMIA, WITH LONG-TERM CURRENT USE OF INSULIN (H): ICD-10-CM

## 2020-09-10 DIAGNOSIS — E11.65 TYPE 2 DIABETES MELLITUS WITH HYPERGLYCEMIA, WITH LONG-TERM CURRENT USE OF INSULIN (H): ICD-10-CM

## 2020-09-10 NOTE — LETTER
September 17, 2020      David Roberts  [address]   [place] [facility] [address]        Dear David,     We have been attempting to reach you by phone. It has been more than a year since your last visit.  Please schedule a face to face appointment.  We are unable to refill your medications without an appointment. Please call or MyChart my office with any questions or concerns.        Sincerely,        Ly Means PA-C

## 2020-09-14 NOTE — TELEPHONE ENCOUNTER
"Routing refill request to provider for review/approval because:  Labs not current:  Serum creatinine, A1C      Requested Prescriptions   Pending Prescriptions Disp Refills     insulin detemir (LEVEMIR FLEXPEN/FLEXTOUCH) 100 UNIT/ML pen 15 mL 0     Sig: Inject 20 Units Subcutaneous every morning Take at same time each day.       Long Acting Insulin Protocol Failed - 9/10/2020 10:18 AM        Failed - Serum creatinine on file in past 12 months     Recent Labs   Lab Test 09/05/19  1004   CR 1.09       Ok to refill medication if creatinine is low          Failed - HgbA1C in past 3 or 6 months     If HgbA1C is 8 or greater, it needs to be on file within the past 3 months.  If less than 8, must be on file within the past 6 months.     Recent Labs   Lab Test 09/05/19  1004   A1C 8.6*             Failed - Recent (6 mo) or future (30 days) visit within the authorizing provider's specialty     Patient had office visit in the last 6 months or has a visit in the next 30 days with authorizing provider or within the authorizing provider's specialty.  See \"Patient Info\" tab in inbasket, or \"Choose Columns\" in Meds & Orders section of the refill encounter.            Passed - Medication is active on med list        Passed - Patient is age 18 or older               Talib An RN, BSN, PHN    "

## 2020-09-15 NOTE — TELEPHONE ENCOUNTER
This writer attempted to contact pt on 09/15/20      Reason for call schedule Office Visit and left message.      If patient calls back:   Schedule Office Visit appointment within 1 week with primary care, document that pt called and close encounter         Minal Giron MA

## 2020-09-16 NOTE — TELEPHONE ENCOUNTER
This writer attempted to contact pt on 09/16/20        Reason for call schedule Office Visit and left message.        If patient calls back:              Schedule Office Visit appointment within 1 week with primary care, document that pt called and close encounter            Minal Giron MA

## 2020-10-28 NOTE — TELEPHONE ENCOUNTER
Appointment made. Loni Flores LPN Staff Nurse     Arterial Line Placement Start time: 10/28/2020 7:19 AM 
End time: 10/28/2020 7:26 AM 
Performed by: Mare Samson MD 
Authorized by: Mare Samson MD  
 
Pre-Procedure Indications:  Arterial pressure monitoring and blood sampling Preanesthetic Checklist: patient identified, risks and benefits discussed, anesthesia consent, site marked, patient being monitored, timeout performed and patient being monitored Procedure:  
Prep:  Chlorhexidine Seldinger Technique?: Yes Orientation:  Right Location:  Radial artery Catheter size:  20 G Number of attempts:  1 Assessment:  
Post-procedure:  Line secured and sterile dressing applied Patient Tolerance:  Patient tolerated the procedure well with no immediate complications

## 2020-12-04 ENCOUNTER — VIRTUAL VISIT (OUTPATIENT)
Dept: FAMILY MEDICINE | Facility: CLINIC | Age: 40
End: 2020-12-04
Payer: COMMERCIAL

## 2020-12-04 DIAGNOSIS — E11.65 TYPE 2 DIABETES MELLITUS WITH HYPERGLYCEMIA, WITH LONG-TERM CURRENT USE OF INSULIN (H): Primary | ICD-10-CM

## 2020-12-04 DIAGNOSIS — Z79.4 TYPE 2 DIABETES MELLITUS WITH HYPERGLYCEMIA, WITH LONG-TERM CURRENT USE OF INSULIN (H): ICD-10-CM

## 2020-12-04 DIAGNOSIS — E87.6 HYPOKALEMIA: ICD-10-CM

## 2020-12-04 DIAGNOSIS — Z79.4 TYPE 2 DIABETES MELLITUS WITH HYPERGLYCEMIA, WITH LONG-TERM CURRENT USE OF INSULIN (H): Primary | ICD-10-CM

## 2020-12-04 DIAGNOSIS — E11.65 TYPE 2 DIABETES MELLITUS WITH HYPERGLYCEMIA, WITH LONG-TERM CURRENT USE OF INSULIN (H): ICD-10-CM

## 2020-12-04 PROCEDURE — 99214 OFFICE O/P EST MOD 30 MIN: CPT | Mod: 95 | Performed by: PHYSICIAN ASSISTANT

## 2020-12-04 RX ORDER — ATORVASTATIN CALCIUM 10 MG/1
10 TABLET, FILM COATED ORAL DAILY
Qty: 30 TABLET | Refills: 0 | Status: SHIPPED | OUTPATIENT
Start: 2020-12-04 | End: 2021-01-05

## 2020-12-04 RX ORDER — FLURBIPROFEN SODIUM 0.3 MG/ML
SOLUTION/ DROPS OPHTHALMIC
Qty: 100 EACH | Refills: 11 | Status: SHIPPED | OUTPATIENT
Start: 2020-12-04 | End: 2022-05-03

## 2020-12-04 RX ORDER — POTASSIUM CHLORIDE 750 MG/1
10 TABLET, EXTENDED RELEASE ORAL DAILY
Qty: 30 TABLET | Refills: 0 | Status: SHIPPED | OUTPATIENT
Start: 2020-12-04 | End: 2021-01-04

## 2020-12-04 RX ORDER — METFORMIN HCL 500 MG
1000 TABLET, EXTENDED RELEASE 24 HR ORAL 2 TIMES DAILY WITH MEALS
Qty: 120 TABLET | Refills: 0 | Status: SHIPPED | OUTPATIENT
Start: 2020-12-04 | End: 2021-01-04

## 2020-12-04 RX ORDER — LISINOPRIL 2.5 MG/1
2.5 TABLET ORAL DAILY
Qty: 30 TABLET | Refills: 0 | Status: SHIPPED | OUTPATIENT
Start: 2020-12-04 | End: 2021-01-04

## 2020-12-04 NOTE — PROGRESS NOTES
"David Roberts is a 40 year old male who is being evaluated via a billable telephone visit.      The patient has been notified of following:     \"This telephone visit will be conducted via a call between you and your physician/provider. We have found that certain health care needs can be provided without the need for a physical exam.  This service lets us provide the care you need with a short phone conversation.  If a prescription is necessary we can send it directly to your pharmacy.  If lab work is needed we can place an order for that and you can then stop by our lab to have the test done at a later time.    Telephone visits are billed at different rates depending on your insurance coverage. During this emergency period, for some insurers they may be billed the same as an in-person visit.  Please reach out to your insurance provider with any questions.    If during the course of the call the physician/provider feels a telephone visit is not appropriate, you will not be charged for this service.\"    Patient has given verbal consent for Telephone visit?  Yes    What phone number would you like to be contacted at?     How would you like to obtain your AVS? Mail a copy    Subjective     David Roberts is a 40 year old male who presents via phone visit today for the following health issues:    HPI     Diabetes Follow-up    How often are you checking your blood sugar? A few times a week  What time of day are you checking your blood sugars (select all that apply)?  Before meals  Have you had any blood sugars above 200?  Yes 353 this AM   Have you had any blood sugars below 70?  No    What symptoms do you notice when your blood sugar is low?  None and Not applicablethis morning 353     Urinating every 45 minutes     Recently high- 2 weeks     What concerns do you have today about your diabetes? Blood sugar is often over 200     Do you have any of these symptoms? (Select all that apply)  Burning in feet, " Excessive thirst and Weight loss     Lost 5 pounds -nonintentional    Have you had a diabetic eye exam in the last 12 months? No                Hyperlipidemia Follow-Up      Are you regularly taking any medication or supplement to lower your cholesterol?   Yes- lipitor 10 mg     Are you having muscle aches or other side effects that you think could be caused by your cholesterol lowering medication?  No    Hypertension Follow-up      Do you check your blood pressure regularly outside of the clinic? No     Are you following a low salt diet? Yes    Are your blood pressures ever more than 140 on the top number (systolic) OR more   than 90 on the bottom number (diastolic), for example 140/90? Not checking blood pressure     BP Readings from Last 2 Encounters:   09/05/19 121/80   08/29/18 119/75     Hemoglobin A1C (%)   Date Value   09/05/2019 8.6 (H)   08/29/2018 8.4 (H)     LDL Cholesterol Calculated (mg/dL)   Date Value   09/05/2019 101 (H)   05/30/2018 90         How many servings of fruits and vegetables do you eat daily?  0-1    On average, how many sweetened beverages do you drink each day (Examples: soda, juice, sweet tea, etc.  Do NOT count diet or artificially sweetened beverages)?   3apple juice- once in awhile - drink cocacola sometimes one or two a day    How many days per week do you exercise enough to make your heart beat faster? 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 No exercise outside of work     How many days per week do you miss taking your medication?   Lately levemir ran out- waited for some time - has been without medications- wasn't aware limited supply sent to pharmacy    Complains of difficulty maintaining an erection- over a year              Review of Systems   Constitutional, HEENT, cardiovascular, pulmonary, gi and gu systems are negative, except as otherwise noted.       Objective           Vitals:  No vitals were obtained today due to virtual visit.    healthy, alert and no distress  PSYCH: Alert and oriented times 3; coherent speech, normal   rate and volume, able to articulate logical thoughts, able   to abstract reason, no tangential thoughts, no hallucinations   or delusions  His affect is normal and pleasant  RESP: No cough, no audible wheezing, able to talk in full sentences  Remainder of exam unable to be completed due to telephone visits            Assessment/Plan:    Assessment & Plan     Type 2 diabetes mellitus with hyperglycemia, with long-term current use of insulin (H)  Has been more than a year since I have seen patient .  Diabetes is out of control given symptoms and lack of followup and previous A1C >8- needs labs ASAP- given one month of medications -will not give any addiitonal refills without labs and follow up with   - TSH with free T4 reflex  - atorvastatin (LIPITOR) 10 MG tablet  Dispense: 30 tablet; Refill: 0  - blood glucose (NO BRAND SPECIFIED) lancets standard  Dispense: 150 each; Refill: 11  - blood glucose (NO BRAND SPECIFIED) test strip  Dispense: 150 strip; Refill: 11  - blood glucose monitoring (NO BRAND SPECIFIED) meter device kit  Dispense: 1 kit; Refill: 0  - insulin detemir (LEVEMIR FLEXPEN/FLEXTOUCH) 100 UNIT/ML pen  Dispense: 15 mL; Refill: 0  - insulin pen needle (B-D U/F) 31G X 5 MM miscellaneous  Dispense: 100 each; Refill: 11  - lisinopril (ZESTRIL) 2.5 MG tablet  Dispense: 30 tablet; Refill: 0  - metFORMIN (GLUCOPHAGE-XR) 500 MG 24 hr tablet  Dispense: 120 tablet; Refill: 0  - AMBULATORY ADULT DIABETES EDUCATOR REFERRAL    Hypokalemia  I have no idea where potassium is at- needs labs ASAP  - potassium chloride ER (K-TAB/KLOR-CON) 10 MEQ CR tablet  Dispense: 30 tablet; Refill: 0          Patient Instructions   Schedule a fasting lab only appointment at your earliest convenience  (nothint to eat or drink for 9 hours prior except water and/or  medications).   No further refills will be granted without a lab appointment.   Follow up with diabetes educator as soon as possible .    Follow up with me in clinic in approximately 3 months.           Return in about 3 months (around 3/4/2021), or if symptoms worsen or fail to improve, for in person.    Ly Means PA-C  [facility]    Phone call duration:  12 minutes

## 2020-12-05 NOTE — PATIENT INSTRUCTIONS
Schedule a fasting lab only appointment at your earliest convenience  (nothint to eat or drink for 9 hours prior except water and/or medications).   No further refills will be granted without a lab appointment.   Follow up with diabetes educator as soon as possible .    Follow up with me in clinic in approximately 3 months.

## 2020-12-07 NOTE — PROGRESS NOTES
Mailing AVS to patient's address listed in the chart. This will go out in tomorrow's mail.  Gabriela Blanton [facility]  2nd Floor  Primary Care

## 2020-12-08 ENCOUNTER — TELEPHONE (OUTPATIENT)
Dept: FAMILY MEDICINE | Facility: CLINIC | Age: 40
End: 2020-12-08

## 2020-12-08 DIAGNOSIS — E11.65 TYPE 2 DIABETES MELLITUS WITH HYPERGLYCEMIA, WITH LONG-TERM CURRENT USE OF INSULIN (H): ICD-10-CM

## 2020-12-08 DIAGNOSIS — E11.65 TYPE 2 DIABETES MELLITUS WITH HYPERGLYCEMIA, WITH LONG-TERM CURRENT USE OF INSULIN (H): Primary | ICD-10-CM

## 2020-12-08 DIAGNOSIS — Z79.4 TYPE 2 DIABETES MELLITUS WITH HYPERGLYCEMIA, WITH LONG-TERM CURRENT USE OF INSULIN (H): ICD-10-CM

## 2020-12-08 DIAGNOSIS — Z79.4 TYPE 2 DIABETES MELLITUS WITH HYPERGLYCEMIA, WITH LONG-TERM CURRENT USE OF INSULIN (H): Primary | ICD-10-CM

## 2020-12-08 DIAGNOSIS — Z13.220 SCREENING FOR HYPERLIPIDEMIA: ICD-10-CM

## 2020-12-08 LAB
ALBUMIN SERPL-MCNC: 3.8 G/DL (ref 3.4–5)
ALP SERPL-CCNC: 79 U/L (ref 40–150)
ALT SERPL W P-5'-P-CCNC: 34 U/L (ref 0–70)
ANION GAP SERPL CALCULATED.3IONS-SCNC: 5 MMOL/L (ref 3–14)
AST SERPL W P-5'-P-CCNC: 19 U/L (ref 0–45)
BILIRUB SERPL-MCNC: 1 MG/DL (ref 0.2–1.3)
BUN SERPL-MCNC: 11 MG/DL (ref 7–30)
CALCIUM SERPL-MCNC: 8.9 MG/DL (ref 8.5–10.1)
CHLORIDE SERPL-SCNC: 104 MMOL/L (ref 94–109)
CHOLEST SERPL-MCNC: 179 MG/DL
CO2 SERPL-SCNC: 28 MMOL/L (ref 20–32)
CREAT SERPL-MCNC: 1.03 MG/DL (ref 0.66–1.25)
CREAT UR-MCNC: 238 MG/DL
GFR SERPL CREATININE-BSD FRML MDRD: >90 ML/MIN/{1.73_M2}
GLUCOSE SERPL-MCNC: 186 MG/DL (ref 70–99)
HBA1C MFR BLD: 10.9 % (ref 0–5.6)
HDLC SERPL-MCNC: 37 MG/DL
LDLC SERPL CALC-MCNC: 125 MG/DL
MICROALBUMIN UR-MCNC: 12 MG/L
MICROALBUMIN/CREAT UR: 4.87 MG/G CR (ref 0–17)
NONHDLC SERPL-MCNC: 142 MG/DL
POTASSIUM SERPL-SCNC: 3.8 MMOL/L (ref 3.4–5.3)
PROT SERPL-MCNC: 6.9 G/DL (ref 6.8–8.8)
SODIUM SERPL-SCNC: 137 MMOL/L (ref 133–144)
TRIGL SERPL-MCNC: 87 MG/DL
TSH SERPL DL<=0.005 MIU/L-ACNC: 1.02 MU/L (ref 0.4–4)

## 2020-12-08 PROCEDURE — 36415 COLL VENOUS BLD VENIPUNCTURE: CPT | Performed by: PHYSICIAN ASSISTANT

## 2020-12-08 PROCEDURE — 84443 ASSAY THYROID STIM HORMONE: CPT | Performed by: PHYSICIAN ASSISTANT

## 2020-12-08 PROCEDURE — 80061 LIPID PANEL: CPT | Performed by: PHYSICIAN ASSISTANT

## 2020-12-08 PROCEDURE — 80053 COMPREHEN METABOLIC PANEL: CPT | Performed by: PHYSICIAN ASSISTANT

## 2020-12-08 PROCEDURE — 83036 HEMOGLOBIN GLYCOSYLATED A1C: CPT | Performed by: PHYSICIAN ASSISTANT

## 2020-12-08 PROCEDURE — 82043 UR ALBUMIN QUANTITATIVE: CPT | Performed by: PHYSICIAN ASSISTANT

## 2020-12-08 RX ORDER — GLIPIZIDE 5 MG/1
5 TABLET, FILM COATED, EXTENDED RELEASE ORAL DAILY
Qty: 30 TABLET | Refills: 0 | Status: SHIPPED | OUTPATIENT
Start: 2020-12-08 | End: 2021-02-04

## 2020-12-08 NOTE — RESULT ENCOUNTER NOTE
Please call and advise that diabetes is out of control with an A1C of 10.9 when this should be 7.0 or lower.  Check blood sugars 4 times a day.  Call with any lows.  Add glipizide 5 mg daily- Increase insulin by 2 units every 7 days until more than 1/2 of fasting blood sugars less than 200.  Follow up with diabetes education ASAP!!

## 2020-12-08 NOTE — TELEPHONE ENCOUNTER
This writer attempted to contact [facility] on 12/08/20      Reason for call results and left message.      If patient calls back:   Registered Nurse called. Follow Triage Call workflow        NARCISO Robledo Christine E, PA-C   12/8/2020  1:56 PM CST      Please call and advise that diabetes is out of control with an A1C of 10.9 when this should be 7.0 or lower.  Check blood sugars 4 times a day.  Call with any lows.  Add glipizide 5 mg daily- Increase insulin by 2 units every 7 days until more than 1/2 of fasting blood sugars less than 200.  Follow up with diabetes education ASAP!!

## 2020-12-08 NOTE — LETTER
December 10, 2020      David Roberts  [address]   [place] [facility] [address]        Dear David,     We have been attempting to reach you by phone.  Your diabetes is out of control with an A1C of 10.9 when this should be 7.0 or lower.  Check blood sugars 4 times a day.  Call with any lows.  Add glipizide 5 mg daily- Increase insulin by 2 units every 7 days until more than 1/2 of fasting blood sugars less than 200.  Follow up with diabetes education ASAP!!  Please call or MyChart my office with any questions or concerns.         Sincerely,        Ly Means PA-C

## 2020-12-09 ENCOUNTER — TELEPHONE (OUTPATIENT)
Dept: FAMILY MEDICINE | Facility: CLINIC | Age: 40
End: 2020-12-09

## 2020-12-09 NOTE — TELEPHONE ENCOUNTER
This writer attempted to contact [facility] on 12/09/20      Reason for call lab results and left message.      If patient calls back:   Registered Nurse called. Follow Triage Call workflow        Talib An RN, BSN, PHN

## 2020-12-09 NOTE — TELEPHONE ENCOUNTER
Diabetes Education Scheduling Outreach #1:    Call to patient to schedule. Left message with phone number to call to schedule.    Plan for 2nd outreach attempt within 2 business days.    [facility]  Diabetes and Nutrition Scheduling

## 2020-12-10 NOTE — TELEPHONE ENCOUNTER
3 unsuccessful attempts at trying to reach patient via phone. Requesting to have letter sent with results.        This writer attempted to contact [place] on 12/10/20      Reason for call results and left message.      If patient calls back:   Registered Nurse called. Follow Triage Call workflow        Tati Romo RN

## 2020-12-11 NOTE — TELEPHONE ENCOUNTER
Diabetes Education Scheduling Outreach #2:    Call to patient to schedule. Left message with phone number to call to schedule.    Jo Philippe  [facility]  Diabetes and Nutrition Scheduling

## 2020-12-28 DIAGNOSIS — E11.65 TYPE 2 DIABETES MELLITUS WITH HYPERGLYCEMIA, WITH LONG-TERM CURRENT USE OF INSULIN (H): ICD-10-CM

## 2020-12-28 DIAGNOSIS — Z79.4 TYPE 2 DIABETES MELLITUS WITH HYPERGLYCEMIA, WITH LONG-TERM CURRENT USE OF INSULIN (H): ICD-10-CM

## 2021-01-02 DIAGNOSIS — Z79.4 TYPE 2 DIABETES MELLITUS WITH HYPERGLYCEMIA, WITH LONG-TERM CURRENT USE OF INSULIN (H): ICD-10-CM

## 2021-01-02 DIAGNOSIS — E11.65 TYPE 2 DIABETES MELLITUS WITH HYPERGLYCEMIA, WITH LONG-TERM CURRENT USE OF INSULIN (H): ICD-10-CM

## 2021-01-05 RX ORDER — ATORVASTATIN CALCIUM 10 MG/1
10 TABLET, FILM COATED ORAL DAILY
Qty: 30 TABLET | Refills: 0 | Status: SHIPPED | OUTPATIENT
Start: 2021-01-05 | End: 2021-02-04

## 2021-02-04 ENCOUNTER — TELEPHONE (OUTPATIENT)
Dept: FAMILY MEDICINE | Facility: CLINIC | Age: 41
End: 2021-02-04

## 2021-02-04 DIAGNOSIS — E11.65 TYPE 2 DIABETES MELLITUS WITH HYPERGLYCEMIA, WITH LONG-TERM CURRENT USE OF INSULIN (H): ICD-10-CM

## 2021-02-04 DIAGNOSIS — Z79.4 TYPE 2 DIABETES MELLITUS WITH HYPERGLYCEMIA, WITH LONG-TERM CURRENT USE OF INSULIN (H): ICD-10-CM

## 2021-02-04 RX ORDER — ATORVASTATIN CALCIUM 10 MG/1
10 TABLET, FILM COATED ORAL DAILY
Qty: 90 TABLET | Refills: 3 | Status: SHIPPED | OUTPATIENT
Start: 2021-02-04 | End: 2021-06-10

## 2021-02-04 RX ORDER — LISINOPRIL 2.5 MG/1
2.5 TABLET ORAL DAILY
Qty: 30 TABLET | Refills: 0 | Status: SHIPPED | OUTPATIENT
Start: 2021-02-04 | End: 2021-03-26

## 2021-02-04 RX ORDER — GLIPIZIDE 5 MG/1
5 TABLET, FILM COATED, EXTENDED RELEASE ORAL DAILY
Qty: 90 TABLET | Refills: 1 | Status: SHIPPED | OUTPATIENT
Start: 2021-02-04 | End: 2021-03-26

## 2021-02-04 NOTE — TELEPHONE ENCOUNTER
Given one month- no further refills without face to face visit- please call and assist with scheduling

## 2021-02-04 NOTE — LETTER
[address]  [address]  [phone]    February 5, 2021      David Roberts  [address]   [facility] [address]      Dear David,    We have refilled your lisinopril.   We will need to see you for an office visit, before any additional refills can be given.  Please call [phone] to schedule this appointment.      Thank you,    [facility]

## 2021-02-04 NOTE — TELEPHONE ENCOUNTER
Routing refill request to provider for review/approval because:  No recent BP's on file        Talib An RN, BSN, PHN

## 2021-02-18 DIAGNOSIS — Z79.4 TYPE 2 DIABETES MELLITUS WITH HYPERGLYCEMIA, WITH LONG-TERM CURRENT USE OF INSULIN (H): ICD-10-CM

## 2021-02-18 DIAGNOSIS — E11.65 TYPE 2 DIABETES MELLITUS WITH HYPERGLYCEMIA, WITH LONG-TERM CURRENT USE OF INSULIN (H): ICD-10-CM

## 2021-02-18 RX ORDER — METFORMIN HCL 500 MG
1000 TABLET, EXTENDED RELEASE 24 HR ORAL 2 TIMES DAILY WITH MEALS
Qty: 120 TABLET | Refills: 1 | OUTPATIENT
Start: 2021-02-18

## 2021-02-18 NOTE — TELEPHONE ENCOUNTER
Metformin refill request  Per 2/4/21 telephone encounter was given one month-no further refills without face to face visit.  Letter was sent to patient.   No appointment pended.    To provider to advise.  Sarah DALE RN

## 2021-02-18 NOTE — TELEPHONE ENCOUNTER
This writer attempted to contact pt on 02/18/21      Reason for call OV and left message.      If patient calls back:   Schedule Office Visit appointment within 2 weeks with PCP, document that pt called and close encounter         Minal Giron MA

## 2021-02-18 NOTE — LETTER
February 23, 2021      David Roberts  [address]   [place] [facility] [address]        Dear David,       We have been attempting to reach you by phone.  Please schedule an office visit as soon as possible.  We are unable to fill your medication without an office visit. If you schedule an office visit we can get you enough medication to make it to your appointment.   Please call or MyChart my office with any questions or concerns.      Sincerely,        Ly Means PA-C

## 2021-02-18 NOTE — TELEPHONE ENCOUNTER
Prescription for one month with one refill given 1/4/21- should have been able to fill prescription on 2/4/21.  Needs face to face appointment for any further refills - please call and assist with scheduling

## 2021-02-19 NOTE — TELEPHONE ENCOUNTER
This writer attempted to contact pt on 02/19/21      Reason for call schedule FTF OV visit and left message.      If patient calls back:   Schedule Office Visit appointment within 2 weeks with PCP, document that pt called and close encounter         Maryjane Mayse

## 2021-03-10 DIAGNOSIS — E11.65 TYPE 2 DIABETES MELLITUS WITH HYPERGLYCEMIA, WITH LONG-TERM CURRENT USE OF INSULIN (H): ICD-10-CM

## 2021-03-10 DIAGNOSIS — Z79.4 TYPE 2 DIABETES MELLITUS WITH HYPERGLYCEMIA, WITH LONG-TERM CURRENT USE OF INSULIN (H): ICD-10-CM

## 2021-03-10 NOTE — TELEPHONE ENCOUNTER
Refill declined.  Assist with scheduling face to face visit.  Was sent letter 2/23/21 advising needs office visit

## 2021-03-10 NOTE — LETTER
March 12, 2021      David Roberst  [address]   [place] [address]      Dear David,       We have been attempting to reach you by phone.  Please schedule an office visit as soon as possible.  We are unable to fill your medication without an office visit. If you schedule an office visit we can get you enough medication to make it to your appointment.   Please call or MyChart my office with any questions or concerns.      Sincerely,        Ly Means PA-C

## 2021-03-10 NOTE — TELEPHONE ENCOUNTER
This writer attempted to contact pt on 03/10/21      Reason for call schedule and left message.      If patient calls back:   Schedule Office Visit appointment within 2 weeks with PCP, document that pt called and close encounter         Maryjane Mayes

## 2021-03-10 NOTE — TELEPHONE ENCOUNTER
Routing refill request to provider for review/approval because:  Labs not current:  A1C    Annalee PINEDON, RN

## 2021-03-11 NOTE — TELEPHONE ENCOUNTER
This writer attempted to contact pt on 03/11/21      Reason for call schedule OV and left message.      If patient calls back:   Schedule Office Visit appointment within 2 weeks with PCP, document that pt called and close encounter         Minal Giron MA

## 2021-03-17 DIAGNOSIS — Z79.4 TYPE 2 DIABETES MELLITUS WITH HYPERGLYCEMIA, WITH LONG-TERM CURRENT USE OF INSULIN (H): ICD-10-CM

## 2021-03-17 DIAGNOSIS — E11.65 TYPE 2 DIABETES MELLITUS WITH HYPERGLYCEMIA, WITH LONG-TERM CURRENT USE OF INSULIN (H): ICD-10-CM

## 2021-03-19 RX ORDER — METFORMIN HCL 500 MG
1000 TABLET, EXTENDED RELEASE 24 HR ORAL 2 TIMES DAILY WITH MEALS
Qty: 120 TABLET | Refills: 1 | OUTPATIENT
Start: 2021-03-19

## 2021-03-19 NOTE — TELEPHONE ENCOUNTER
Routing refill request to provider for review/approval because:  Labs not current:    Lab Results   Component Value Date    A1C 10.9 12/08/2020    A1C 8.6 09/05/2019    A1C 8.4 08/29/2018    A1C 13.2 04/26/2018    A1C 6.8 07/26/2016     Demetria Elise RN

## 2021-03-26 ENCOUNTER — OFFICE VISIT (OUTPATIENT)
Dept: FAMILY MEDICINE | Facility: CLINIC | Age: 41
End: 2021-03-26
Payer: COMMERCIAL

## 2021-03-26 VITALS
TEMPERATURE: 98.7 F | WEIGHT: 181 LBS | RESPIRATION RATE: 16 BRPM | SYSTOLIC BLOOD PRESSURE: 120 MMHG | BODY MASS INDEX: 24.89 KG/M2 | OXYGEN SATURATION: 99 % | HEART RATE: 67 BPM | DIASTOLIC BLOOD PRESSURE: 64 MMHG

## 2021-03-26 DIAGNOSIS — Z79.4 TYPE 2 DIABETES MELLITUS WITH HYPERGLYCEMIA, WITH LONG-TERM CURRENT USE OF INSULIN (H): Primary | ICD-10-CM

## 2021-03-26 DIAGNOSIS — E11.65 TYPE 2 DIABETES MELLITUS WITH HYPERGLYCEMIA, WITH LONG-TERM CURRENT USE OF INSULIN (H): Primary | ICD-10-CM

## 2021-03-26 LAB
ALBUMIN SERPL-MCNC: 3.9 G/DL (ref 3.4–5)
ALP SERPL-CCNC: 53 U/L (ref 40–150)
ALT SERPL W P-5'-P-CCNC: 49 U/L (ref 0–70)
ANION GAP SERPL CALCULATED.3IONS-SCNC: 5 MMOL/L (ref 3–14)
AST SERPL W P-5'-P-CCNC: 39 U/L (ref 0–45)
BILIRUB SERPL-MCNC: 0.7 MG/DL (ref 0.2–1.3)
BUN SERPL-MCNC: 11 MG/DL (ref 7–30)
CALCIUM SERPL-MCNC: 8.9 MG/DL (ref 8.5–10.1)
CHLORIDE SERPL-SCNC: 102 MMOL/L (ref 94–109)
CO2 SERPL-SCNC: 30 MMOL/L (ref 20–32)
CREAT SERPL-MCNC: 1.1 MG/DL (ref 0.66–1.25)
GFR SERPL CREATININE-BSD FRML MDRD: 83 ML/MIN/{1.73_M2}
GLUCOSE SERPL-MCNC: 100 MG/DL (ref 70–99)
HBA1C MFR BLD: 6.7 % (ref 0–5.6)
LDLC SERPL DIRECT ASSAY-MCNC: 68 MG/DL
POTASSIUM SERPL-SCNC: 3.5 MMOL/L (ref 3.4–5.3)
PROT SERPL-MCNC: 7 G/DL (ref 6.8–8.8)
SODIUM SERPL-SCNC: 137 MMOL/L (ref 133–144)

## 2021-03-26 PROCEDURE — 99214 OFFICE O/P EST MOD 30 MIN: CPT | Performed by: PHYSICIAN ASSISTANT

## 2021-03-26 PROCEDURE — 83036 HEMOGLOBIN GLYCOSYLATED A1C: CPT | Performed by: PHYSICIAN ASSISTANT

## 2021-03-26 PROCEDURE — 83721 ASSAY OF BLOOD LIPOPROTEIN: CPT | Performed by: PHYSICIAN ASSISTANT

## 2021-03-26 PROCEDURE — 99207 PR FOOT EXAM NO CHARGE: CPT | Performed by: PHYSICIAN ASSISTANT

## 2021-03-26 PROCEDURE — 80053 COMPREHEN METABOLIC PANEL: CPT | Performed by: PHYSICIAN ASSISTANT

## 2021-03-26 PROCEDURE — 36415 COLL VENOUS BLD VENIPUNCTURE: CPT | Performed by: PHYSICIAN ASSISTANT

## 2021-03-26 RX ORDER — LISINOPRIL 2.5 MG/1
2.5 TABLET ORAL DAILY
Qty: 90 TABLET | Refills: 0 | Status: SHIPPED | OUTPATIENT
Start: 2021-03-26 | End: 2021-06-23

## 2021-03-26 RX ORDER — GLIPIZIDE 5 MG/1
5 TABLET, FILM COATED, EXTENDED RELEASE ORAL DAILY
Qty: 90 TABLET | Refills: 0 | Status: SHIPPED | OUTPATIENT
Start: 2021-03-26 | End: 2021-06-23

## 2021-03-26 RX ORDER — METFORMIN HCL 500 MG
1000 TABLET, EXTENDED RELEASE 24 HR ORAL 2 TIMES DAILY WITH MEALS
Qty: 360 TABLET | Refills: 0 | Status: SHIPPED | OUTPATIENT
Start: 2021-03-26 | End: 2021-06-10

## 2021-03-26 NOTE — PROGRESS NOTES
Assessment & Plan     Type 2 diabetes mellitus with hyperglycemia, with long-term current use of insulin (H)  A1C  6.7- I am concerned we are running sugars a bit too low and encouraged to back off of the insulin.  We did add glipizide last labs 3 months ago but I cannot ascertain otherwise why blood sugars so high 3 months ago and low now  Encouraged to schedule eye exam  He declines diabetes education  LDL at goal with lipitor 10 mg daily   Follow up with us in clinic in 3 months- encouraged to get labs prior     - LDL cholesterol direct  - Comprehensive metabolic panel (BMP + Alb, Alk Phos, ALT, AST, Total. Bili, TP)  - Hemoglobin A1c  - glipiZIDE (GLUCOTROL XL) 5 MG 24 hr tablet  Dispense: 90 tablet; Refill: 0  - insulin detemir (LEVEMIR FLEXPEN/FLEXTOUCH) 100 UNIT/ML pen  Dispense: 15 mL; Refill: 0  - lisinopril (ZESTRIL) 2.5 MG tablet  Dispense: 90 tablet; Refill: 0  - metFORMIN (GLUCOPHAGE-XR) 500 MG 24 hr tablet  Dispense: 360 tablet; Refill: 0  - EYE ADULT REFERRAL               Patient Instructions   Schedule a dilated eye exam as soon as possible.    Back off of insulin a bit- back down to 20 units daily and let us know by phone if still getting lows.   Follow up with us in clinic in 3  Months.   Return urgently if any change in symptoms or concerns  Continue glipizide 5 mg daily  Continue metformin 1000 mg twice a day   Continue lisinopril 2.5 mg daily  Continue lipitor 10 mg daily- we will let you know if we need to increase this.   Continue potassium unless you hear differently from us   Continue to check your blood sugars 3-4 times a day      Return in about 3 months (around 6/26/2021), or if symptoms worsen or fail to improve, for in person.    Ly Means PA-C  [facility]    Tyson Hernandez is a 40 year old who presents for the following health issues     HPI     Diabetes Follow-up      How often are you checking your blood sugar? 3 times    What concerns  do you have today about your diabetes? None     Do you have any of these symptoms? (Select all that apply)  burning    Have you had a diabetic eye exam in the last 12 months? No    Checked blood sugar and 66 this morning.  Drank luke warm water.   Checking blood sugars- but hardly check during the day because at work  Sometimes 112 in AM.  Have been Below 125  burning sometimes in feet.   No frequent thirst or urination  Count goes low feels dizzy  22 units insulin - since December December weighed 175 and has had some modest weight gain  Last A1C in December was 10.7 - he cannot tell me why so high then and low today    Wt Readings from Last 4 Encounters:   03/26/21 82.1 kg (181 lb)   09/05/19 83 kg (183 lb)   08/29/18 83 kg (183 lb)   07/05/18 78.9 kg (174 lb)     Works in [employer]  Lightheaded with low blood sugars   Sleepless - woke up at night couldn't sleep and blood sugar was low   BP Readings from Last 2 Encounters:   03/26/21 120/64   09/05/19 121/80     Hemoglobin A1C (%)   Date Value   03/26/2021 6.7 (H)   12/08/2020 10.9 (H)     LDL Cholesterol Calculated (mg/dL)   Date Value   12/08/2020 125 (H)   09/05/2019 101 (H)     LDL Cholesterol Direct (mg/dL)   Date Value   03/26/2021 68   reports to me diet unchanged        Hyperlipidemia Follow-Up      Are you regularly taking any medication or supplement to lower your cholesterol?   Yes- atorvastatin    Are you having muscle aches or other side effects that you think could be caused by your cholesterol lowering medication?  No      How many servings of fruits and vegetables do you eat daily?  0-1    On average, how many sweetened beverages do you drink each day (Examples: soda, juice, sweet tea, etc.  Do NOT count diet or artificially sweetened beverages)?   0    How many days per week do you exercise enough to make your heart beat faster? 3 or less    How many minutes a day do you exercise enough to make your heart beat faster? 30 -  60    How many days per week do you miss taking your medication? 0    If blood sugar down to 80 doesn't take insulin  Takes Insulin at night.   Diet - is ok .   Some fruits - not much for veggies   Doesn't want to eat with diabetes educoator  I know what to eat  Lows at least once every couple weeks         Review of Systems   Constitutional, HEENT, cardiovascular, pulmonary, gi and gu systems are negative, except as otherwise noted.      Objective    /64   Pulse 67   Temp 98.7  F (37.1  C) (Oral)   Resp 16   Wt 82.1 kg (181 lb)   SpO2 99%   BMI 24.89 kg/m    Body mass index is 24.89 kg/m .  Physical Exam   GENERAL: healthy, alert and no distress  NECK: no adenopathy, no asymmetry, masses, or scars and thyroid normal to palpation  RESP: lungs clear to auscultation - no rales, rhonchi or wheezes  CV: regular rate and rhythm, normal S1 S2, no S3 or S4, no murmur, click or rub, no peripheral edema and peripheral pulses strong  ABDOMEN: soft, nontender, no hepatosplenomegaly, no masses and bowel sounds normal  MS: no gross musculoskeletal defects noted, no edema  Diabetic foot exam: normal DP and PT pulses, no trophic changes or ulcerative lesions, normal sensory exam and normal monofilament exam    Results for orders placed or performed in visit on 03/26/21   LDL cholesterol direct     Status: None   Result Value Ref Range    LDL Cholesterol Direct 68 <100 mg/dL   Comprehensive metabolic panel (BMP + Alb, Alk Phos, ALT, AST, Total. Bili, TP)     Status: Abnormal   Result Value Ref Range    Sodium 137 133 - 144 mmol/L    Potassium 3.5 3.4 - 5.3 mmol/L    Chloride 102 94 - 109 mmol/L    Carbon Dioxide 30 20 - 32 mmol/L    Anion Gap 5 3 - 14 mmol/L    Glucose 100 (H) 70 - 99 mg/dL    Urea Nitrogen 11 7 - 30 mg/dL    Creatinine 1.10 0.66 - 1.25 mg/dL    GFR Estimate 83 >60 mL/min/[1.73_m2]    GFR Estimate If Black >90 >60 mL/min/[1.73_m2]    Calcium 8.9 8.5 - 10.1 mg/dL    Bilirubin Total 0.7 0.2 - 1.3 mg/dL     Albumin 3.9 3.4 - 5.0 g/dL    Protein Total 7.0 6.8 - 8.8 g/dL    Alkaline Phosphatase 53 40 - 150 U/L    ALT 49 0 - 70 U/L    AST 39 0 - 45 U/L   Hemoglobin A1c     Status: Abnormal   Result Value Ref Range    Hemoglobin A1C 6.7 (H) 0 - 5.6 %

## 2021-03-26 NOTE — PATIENT INSTRUCTIONS
Schedule a dilated eye exam as soon as possible.    Back off of insulin a bit- back down to 20 units daily and let us know by phone if still getting lows.   Follow up with us in clinic in 3  Months.   Return urgently if any change in symptoms or concerns  Continue glipizide 5 mg daily  Continue metformin 1000 mg twice a day   Continue lisinopril 2.5 mg daily  Continue lipitor 10 mg daily- we will let you know if we need to increase this.   Continue potassium unless you hear differently from us   Continue to check your blood sugars 3-4 times a day

## 2021-03-27 NOTE — RESULT ENCOUNTER NOTE
Please send letter with lab results     Dear David  Your electrolytes, blood sugar, kidney function and liver function were normal.    Your LDL cholesterol is excellent!  Your A1C shows that we may be running your blood sugar too low.  We do not want to see any blood sugars below 80.  So back off on the insulin a bit and follow up with us in clinic in 3 months.   Please call or MyChart my office with any questions or concerns.    Ly Means, PAC

## 2021-04-30 ENCOUNTER — IMMUNIZATION (OUTPATIENT)
Dept: PEDIATRICS | Facility: CLINIC | Age: 41
End: 2021-04-30
Payer: COMMERCIAL

## 2021-04-30 PROCEDURE — 91300 PR COVID VAC PFIZER DIL RECON 30 MCG/0.3 ML IM: CPT

## 2021-04-30 PROCEDURE — 0001A PR COVID VAC PFIZER DIL RECON 30 MCG/0.3 ML IM: CPT

## 2021-05-21 ENCOUNTER — IMMUNIZATION (OUTPATIENT)
Dept: PEDIATRICS | Facility: CLINIC | Age: 41
End: 2021-05-21
Attending: INTERNAL MEDICINE
Payer: COMMERCIAL

## 2021-05-21 PROCEDURE — 91300 PR COVID VAC PFIZER DIL RECON 30 MCG/0.3 ML IM: CPT

## 2021-05-21 PROCEDURE — 0002A PR COVID VAC PFIZER DIL RECON 30 MCG/0.3 ML IM: CPT

## 2021-06-01 DIAGNOSIS — Z79.4 TYPE 2 DIABETES MELLITUS WITH HYPERGLYCEMIA, WITH LONG-TERM CURRENT USE OF INSULIN (H): ICD-10-CM

## 2021-06-01 DIAGNOSIS — E11.65 TYPE 2 DIABETES MELLITUS WITH HYPERGLYCEMIA, WITH LONG-TERM CURRENT USE OF INSULIN (H): ICD-10-CM

## 2021-06-02 NOTE — TELEPHONE ENCOUNTER
"Requested Prescriptions   Signed Prescriptions Disp Refills    insulin detemir (LEVEMIR FLEXPEN/FLEXTOUCH) 100 UNIT/ML pen 30 mL 0     Sig: Inject 20 Units Subcutaneous every morning Take at same time each day.       Long Acting Insulin Protocol Passed - 6/1/2021 11:22 AM        Passed - Serum creatinine on file in past 12 months     Recent Labs   Lab Test 03/26/21  1400   CR 1.10       Ok to refill medication if creatinine is low          Passed - HgbA1C in past 3 or 6 months     If HgbA1C is 8 or greater, it needs to be on file within the past 3 months.  If less than 8, must be on file within the past 6 months.     Recent Labs   Lab Test 03/26/21  1400   A1C 6.7*             Passed - Medication is active on med list        Passed - Patient is age 18 or older        Passed - Recent (6 mo) or future (30 days) visit within the authorizing provider's specialty     Patient had office visit in the last 6 months or has a visit in the next 30 days with authorizing provider or within the authorizing provider's specialty.  See \"Patient Info\" tab in inbasket, or \"Choose Columns\" in Meds & Orders section of the refill encounter.               "

## 2021-06-10 ENCOUNTER — OFFICE VISIT (OUTPATIENT)
Dept: OPTOMETRY | Facility: CLINIC | Age: 41
End: 2021-06-10
Payer: COMMERCIAL

## 2021-06-10 DIAGNOSIS — H52.03 HYPEROPIA, BILATERAL: ICD-10-CM

## 2021-06-10 DIAGNOSIS — H52.4 PRESBYOPIA: ICD-10-CM

## 2021-06-10 DIAGNOSIS — E11.9 TYPE 2 DIABETES MELLITUS WITHOUT COMPLICATION, WITHOUT LONG-TERM CURRENT USE OF INSULIN (H): Primary | ICD-10-CM

## 2021-06-10 DIAGNOSIS — H52.223 REGULAR ASTIGMATISM OF BOTH EYES: ICD-10-CM

## 2021-06-10 PROCEDURE — 92015 DETERMINE REFRACTIVE STATE: CPT | Performed by: OPTOMETRIST

## 2021-06-10 PROCEDURE — 92004 COMPRE OPH EXAM NEW PT 1/>: CPT | Performed by: OPTOMETRIST

## 2021-06-10 ASSESSMENT — REFRACTION_WEARINGRX
OD_SPHERE: +0.75
OD_CYLINDER: SPHERE
OD_ADD: +1.50
OS_ADD: +1.50
SPECS_TYPE: PAL
OS_SPHERE: +0.75
OS_CYLINDER: SPHERE

## 2021-06-10 ASSESSMENT — VISUAL ACUITY
OS_CC+: -2
OS_SC: 20/25
OS_CC: 20/20
OS_SC+: -1
OD_CC: 20/20
OS_CC: 20/40
OD_CC: 20/60
METHOD: SNELLEN - LINEAR
OD_CC+: -3
CORRECTION_TYPE: GLASSES
OD_SC: 20/25

## 2021-06-10 ASSESSMENT — EXTERNAL EXAM - LEFT EYE: OS_EXAM: NORMAL

## 2021-06-10 ASSESSMENT — REFRACTION_MANIFEST
OD_CYLINDER: +0.25
OS_CYLINDER: +0.25
OD_SPHERE: +0.75
OS_AXIS: 169
OD_ADD: +1.50
OS_ADD: +1.50
OS_SPHERE: +0.75
OD_AXIS: 016

## 2021-06-10 ASSESSMENT — CONF VISUAL FIELD
OS_NORMAL: 1
OD_NORMAL: 1

## 2021-06-10 ASSESSMENT — SLIT LAMP EXAM - LIDS
COMMENTS: NORMAL
COMMENTS: NORMAL

## 2021-06-10 ASSESSMENT — EXTERNAL EXAM - RIGHT EYE: OD_EXAM: NORMAL

## 2021-06-10 ASSESSMENT — TONOMETRY
IOP_METHOD: TONOPEN
OS_IOP_MMHG: 19
OD_IOP_MMHG: 19

## 2021-06-10 ASSESSMENT — CUP TO DISC RATIO
OS_RATIO: 0.3
OD_RATIO: 0.3

## 2021-06-10 NOTE — PATIENT INSTRUCTIONS
There are not any signs of the diabetes affecting the eyes today.  It is important that you get your eyes dilated once yearly and keep good control of your diabetes.    Eyeglass prescription given.  Ok to use OTC readers -+2.00.    Return in 1 year for a complete eye exam or sooner if needed.    Chris Calle, GARY    The affects of the dilating drops last for 4- 6 hours.  You will be more sensitive to light and vision will be blurry up close.  Do not drive if you do not feel comfortable.  Mydriatic sunglasses were given if needed.    Patient Education   Diabetes weakens the blood vessels all over the body, including the eyes. Damage to the blood vessels in the eyes can cause swelling or bleeding into part of the eye (called the retina). This is called diabetic retinopathy (ART-tin-AH-puh-thee). If not treated, this disease can cause vision loss or blindness.   Symptoms may include blurred or distorted vision, but many people have no symptoms. It's important to see your eye doctor regularly to check for problems.   Early treatment and good control can help protect your vision. Here are the things you can do to help prevent vision loss:      1. Keep your blood sugar levels under tight control.      2. Bring high blood pressure under control.      3. No smoking.      4. Have yearly dilated eye exams.       Optometry Providers       Clinic Locations                                 Telephone Number   Dr. Sayra [place] [phone]     [facility] Hours:                [facility] Hours:       [facility] Hours:   [address]   [address]     [address] [place] [address]   [place] [address]    [place] [address]  Phone: [phone]                    Phone: [phone]     Phone: [phone]                      Monday 8:00-7:00                          Monday 8:00-7:00                           Monday 8:00-7:00              Tuesday 8:00-6:00                          Tuesday 8:00-7:00                          Tuesday 8:00-7:00              Wednesday 8:00-6:00                  Wednesday 8:00-7:00                   Wednesday 8:00-7:00      Thursday 8:00-6:00                        Thursday 8:00-7:00                         Thursday 8:00-7:00            Friday 8:00-5:00 Friday 8:00-5:00 Friday 8:00-5:00    [facility] Hours:   [address] [place] [address]  [phone]    Monday 8:00-7:00  Tuesday 8:00-7:00  Wednesday 8:00-7:00  Thursday 8:00-7:00  Friday 8:00-5:00  Please log on to [email or web address] to order your contact lenses.  The link is found on the [facility] page.  As always, Thank you for trusting us with your health care needs!

## 2021-06-10 NOTE — LETTER
6/10/2021         RE: David GARCIA Armando  [address]  [place] [address]        Dear Colleague,    Thank you for referring your patient, David Roberts, to the [facility]. Please see a copy of my visit note below.    Chief Complaint   Patient presents with     Diabetic Eye Exam     Vision is stable. Diabetes characteristics include Type 2, on insulin, and taking oral medications. Duration of 10 years. Blood sugar level is controlled.  Accompanied by self  Hemoglobin A1C   Date Value Ref Range Status   03/26/2021 6.7 (H) 0 - 5.6 % Final     Comment:     Normal <5.7% Prediabetes 5.7-6.4%  Diabetes 6.5% or higher - adopted from ADA   consensus guidelines.     12/08/2020 10.9 (H) 0 - 5.6 % Final     Comment:     Normal <5.7% Prediabetes 5.7-6.4%  Diabetes 6.5% or higher - adopted from ADA   consensus guidelines.     09/05/2019 8.6 (H) 0 - 5.6 % Final     Comment:     Normal <5.7% Prediabetes 5.7-6.4%  Diabetes 6.5% or higher - adopted from ADA   consensus guidelines.           Last Eye Exam: 2019  Dilated Previously: Yes    What are you currently using to see?  glasses    Distance Vision Acuity: Satisfied with vision    Near Vision Acuity: Satisfied with vision while reading  with glasses    Eye Comfort: good  Do you use eye drops? : No  Occupation or Hobbies: [employer]     Vy Galvez Optometric Assistant, A.B.O.C.     Medical, surgical and family histories reviewed and updated 6/10/2021.       OBJECTIVE: See Ophthalmology exam    ASSESSMENT:    ICD-10-CM    1. Type 2 diabetes mellitus without complication, without long-term current use of insulin (H)  E11.9 EYE EXAM (SIMPLE-NONBILLABLE)   2. Presbyopia  H52.4 REFRACTION   3. Hyperopia, bilateral  H52.03 REFRACTION   4. Regular astigmatism of both eyes  H52.223 REFRACTION      PLAN:    David Roberts aware  eye exam results will be sent to Ly Means  Patient Instructions   There are not any  signs of the diabetes affecting the eyes today.  It is important that you get your eyes dilated once yearly and keep good control of your diabetes.    Eyeglass prescription given.    Return in 1 year for a complete eye exam or sooner if needed.    Chris Calle, OD               Again, thank you for allowing me to participate in the care of your patient.        Sincerely,        Chris Calle, OD

## 2021-06-10 NOTE — PROGRESS NOTES
Chief Complaint   Patient presents with     Diabetic Eye Exam     Vision is stable. Diabetes characteristics include Type 2, on insulin, and taking oral medications. Duration of 10 years. Blood sugar level is controlled.  Accompanied by self  Hemoglobin A1C   Date Value Ref Range Status   03/26/2021 6.7 (H) 0 - 5.6 % Final     Comment:     Normal <5.7% Prediabetes 5.7-6.4%  Diabetes 6.5% or higher - adopted from ADA   consensus guidelines.     12/08/2020 10.9 (H) 0 - 5.6 % Final     Comment:     Normal <5.7% Prediabetes 5.7-6.4%  Diabetes 6.5% or higher - adopted from ADA   consensus guidelines.     09/05/2019 8.6 (H) 0 - 5.6 % Final     Comment:     Normal <5.7% Prediabetes 5.7-6.4%  Diabetes 6.5% or higher - adopted from ADA   consensus guidelines.           Last Eye Exam: 2019  Dilated Previously: Yes    What are you currently using to see?  glasses    Distance Vision Acuity: Satisfied with vision    Near Vision Acuity: Satisfied with vision while reading  with glasses    Eye Comfort: good  Do you use eye drops? 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     Medical, surgical and family histories reviewed and updated 6/10/2021.       OBJECTIVE: See Ophthalmology exam    ASSESSMENT:    ICD-10-CM    1. Type 2 diabetes mellitus without complication, without long-term current use of insulin (H)  E11.9 EYE EXAM (SIMPLE-NONBILLABLE)   2. Presbyopia  H52.4 REFRACTION   3. Hyperopia, bilateral  H52.03 REFRACTION   4. Regular astigmatism of both eyes  H52.223 REFRACTION      PLAN:    David euceda  eye exam results will be sent to Ly Means  Patient Instructions   There are not any signs of the diabetes affecting the eyes today.  It is important that you get your eyes dilated once yearly and keep good control of your diabetes.    Eyeglass prescription given.    Return in 1 year for a complete eye exam or sooner if needed.    Chris Calle, GARY

## 2021-06-23 DIAGNOSIS — Z79.4 TYPE 2 DIABETES MELLITUS WITH HYPERGLYCEMIA, WITH LONG-TERM CURRENT USE OF INSULIN (H): ICD-10-CM

## 2021-06-23 DIAGNOSIS — E11.65 TYPE 2 DIABETES MELLITUS WITH HYPERGLYCEMIA, WITH LONG-TERM CURRENT USE OF INSULIN (H): ICD-10-CM

## 2021-06-23 RX ORDER — GLIPIZIDE 5 MG/1
5 TABLET, FILM COATED, EXTENDED RELEASE ORAL DAILY
Qty: 90 TABLET | Refills: 0 | Status: SHIPPED | OUTPATIENT
Start: 2021-06-23 | End: 2022-05-03

## 2021-09-27 ENCOUNTER — TELEPHONE (OUTPATIENT)
Dept: FAMILY MEDICINE | Facility: CLINIC | Age: 41
End: 2021-09-27

## 2021-09-28 NOTE — TELEPHONE ENCOUNTER
Called pt. We only do chest xray on pts that had been positive in the past for TB. I scheduled him for mantoux as he has never been positive for TB and this is for school.

## 2021-10-05 ENCOUNTER — ALLIED HEALTH/NURSE VISIT (OUTPATIENT)
Dept: FAMILY MEDICINE | Facility: CLINIC | Age: 41
End: 2021-10-05
Payer: COMMERCIAL

## 2021-10-05 DIAGNOSIS — Z11.1 SCREENING EXAMINATION FOR PULMONARY TUBERCULOSIS: Primary | ICD-10-CM

## 2021-10-05 PROCEDURE — 86580 TB INTRADERMAL TEST: CPT

## 2021-10-05 PROCEDURE — 99207 PR NO CHARGE NURSE ONLY: CPT

## 2021-10-07 ENCOUNTER — ALLIED HEALTH/NURSE VISIT (OUTPATIENT)
Dept: NURSING | Facility: CLINIC | Age: 41
End: 2021-10-07
Payer: COMMERCIAL

## 2021-10-07 ENCOUNTER — TELEPHONE (OUTPATIENT)
Dept: FAMILY MEDICINE | Facility: CLINIC | Age: 41
End: 2021-10-07

## 2021-10-07 DIAGNOSIS — R76.11 MANTOUX: POSITIVE: Primary | ICD-10-CM

## 2021-10-07 LAB
PPDINDURATION: 19 MM (ref 0–5)
PPDREDNESS: 19 MM

## 2021-10-07 PROCEDURE — 99207 PR NO CHARGE NURSE ONLY: CPT

## 2021-10-07 NOTE — TELEPHONE ENCOUNTER
See previous phone note routed to the team.  He will need lab to confirm positive mantoux and chest xray and discussion. May schedule lab and xray and follow up with me virtually if desires or face to face visit.    Please assist with arranging appointments

## 2021-10-07 NOTE — RESULT ENCOUNTER NOTE
Please assist with scheduling follow up with me in clinic for positive mantoux- will need chest xray and discussion of positive results

## 2021-10-08 DIAGNOSIS — R76.11 MANTOUX: POSITIVE: Primary | ICD-10-CM

## 2021-10-12 ENCOUNTER — ANCILLARY PROCEDURE (OUTPATIENT)
Dept: GENERAL RADIOLOGY | Facility: CLINIC | Age: 41
End: 2021-10-12
Attending: PHYSICIAN ASSISTANT
Payer: COMMERCIAL

## 2021-10-12 DIAGNOSIS — R76.11 MANTOUX: POSITIVE: ICD-10-CM

## 2021-10-12 PROCEDURE — 71046 X-RAY EXAM CHEST 2 VIEWS: CPT | Mod: GC | Performed by: RADIOLOGY

## 2021-10-18 ENCOUNTER — TELEPHONE (OUTPATIENT)
Dept: FAMILY MEDICINE | Facility: CLINIC | Age: 41
End: 2021-10-18

## 2021-10-19 ENCOUNTER — TELEPHONE (OUTPATIENT)
Dept: FAMILY MEDICINE | Facility: CLINIC | Age: 41
End: 2021-10-19

## 2021-10-19 ENCOUNTER — VIRTUAL VISIT (OUTPATIENT)
Dept: FAMILY MEDICINE | Facility: CLINIC | Age: 41
End: 2021-10-19
Payer: COMMERCIAL

## 2021-10-19 DIAGNOSIS — R76.11 MANTOUX: POSITIVE: Primary | ICD-10-CM

## 2021-10-19 DIAGNOSIS — Z79.4 TYPE 2 DIABETES MELLITUS WITH HYPERGLYCEMIA, WITH LONG-TERM CURRENT USE OF INSULIN (H): ICD-10-CM

## 2021-10-19 DIAGNOSIS — Z13.220 SCREENING FOR HYPERLIPIDEMIA: ICD-10-CM

## 2021-10-19 DIAGNOSIS — E11.65 TYPE 2 DIABETES MELLITUS WITH HYPERGLYCEMIA, WITH LONG-TERM CURRENT USE OF INSULIN (H): ICD-10-CM

## 2021-10-19 PROCEDURE — 99213 OFFICE O/P EST LOW 20 MIN: CPT | Mod: 95 | Performed by: PHYSICIAN ASSISTANT

## 2021-10-19 NOTE — PROGRESS NOTES
David is a 41 year old who is being evaluated via a billable video visit.      How would you like to obtain your AVS? Mail a copy  If the video visit is dropped, the invitation should be resent by: anyone else be joining your video visit? No      Video Start Time: 1141      ICD-10-CM    1. Mantoux: positive  R76.11    2. Type 2 diabetes mellitus with hyperglycemia, with long-term current use of insulin (H)  E11.65 Lipid panel reflex to direct LDL Fasting    Z79.4 Hemoglobin A1c     Comprehensive metabolic panel (BMP + Alb, Alk Phos, ALT, AST, Total. Bili, TP)   3. Screening for hyperlipidemia  Z13.220 Lipid panel reflex to direct LDL Fasting   reports treated for latent tb 2000  No mantoux tests should be placed in the future- annual chest xrays are recommended  Due for diabetes check and fasting labs  Letter for employer signed and left at  for patient to       Subjective   David is a 41 year old who presents for the following health issues     HPI         Discus positive mantoux. 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      Objective           Vitals:  No vitals were obtained today due to virtual visit.    Physical Exam   GENERAL: Healthy, alert and no distress  EYES: Eyes grossly normal to inspection.  No discharge or erythema, or obvious scleral/conjunctival abnormalities.  RESP: No audible wheeze, cough, or visible cyanosis.  No visible retractions or increased work of breathing.    SKIN: Visible skin clear. No  significant rash, abnormal pigmentation or lesions.  NEURO: Cranial nerves grossly intact.  Mentation and speech appropriate for age.  PSYCH: Mentation appears normal, affect normal/bright, judgement and insight intact, normal speech and appearance well-groomed.    Allied Health/Nurse Visit on 10/05/2021   Component Date Value Ref Range Status     PPD Induration 10/07/2021 19* 0 - 5 mm Final     PPD Redness 10/07/2021 19  mm Final     nORMAL chest xray             Video-Visit Details    Type of service:  Video Visit    Video End Time:1150    Originating Location (pt. Location): Home    Distant Location (provider location):  [facility]     Platform used for Video Visit: [employer]

## 2021-10-19 NOTE — LETTER
October 19, 2021      David Roberts  [address] [address]        To Whom It May Concern,     Whigham's skin TB test will always be positive.  His chest xray was normal with no evidence of tuberculosis.        Sincerely,        Ly Means PA-C

## 2021-10-20 PROBLEM — R76.11 MANTOUX: POSITIVE: Status: ACTIVE | Noted: 2021-10-20

## 2021-10-20 NOTE — PATIENT INSTRUCTIONS
You should not have a skin Tuberculosis test in the future.  You will need annual chest xrays to rule out active TB  Follow up with me in clinic for a diabetic check and schedule a fasting lab appointment (nothing to eat or drink 9 hours prior except water and/or your medications) at your earliest convenience.  Please do this a couple days prior to follow up with me in clinic

## 2022-01-19 ENCOUNTER — IMMUNIZATION (OUTPATIENT)
Dept: NURSING | Facility: CLINIC | Age: 42
End: 2022-01-19
Payer: COMMERCIAL

## 2022-01-19 PROCEDURE — 0054A COVID-19,PF,PFIZER (12+ YRS): CPT

## 2022-01-19 PROCEDURE — 91305 COVID-19,PF,PFIZER (12+ YRS): CPT

## 2022-05-01 ENCOUNTER — TELEPHONE (OUTPATIENT)
Dept: FAMILY MEDICINE | Facility: CLINIC | Age: 42
End: 2022-05-01

## 2022-05-01 DIAGNOSIS — E11.65 TYPE 2 DIABETES MELLITUS WITH HYPERGLYCEMIA, WITH LONG-TERM CURRENT USE OF INSULIN (H): ICD-10-CM

## 2022-05-01 DIAGNOSIS — Z79.4 TYPE 2 DIABETES MELLITUS WITH HYPERGLYCEMIA, WITH LONG-TERM CURRENT USE OF INSULIN (H): ICD-10-CM

## 2022-05-03 RX ORDER — LISINOPRIL 2.5 MG/1
2.5 TABLET ORAL DAILY
Qty: 30 TABLET | Refills: 0 | Status: SHIPPED | OUTPATIENT
Start: 2022-05-03 | End: 2022-06-30 | Stop reason: DRUGHIGH

## 2022-05-03 RX ORDER — FLURBIPROFEN SODIUM 0.3 MG/ML
SOLUTION/ DROPS OPHTHALMIC
Qty: 30 EACH | Refills: 0 | Status: SHIPPED | OUTPATIENT
Start: 2022-05-03 | End: 2022-06-30

## 2022-05-03 RX ORDER — GLIPIZIDE 5 MG/1
5 TABLET, FILM COATED, EXTENDED RELEASE ORAL DAILY
Qty: 30 TABLET | Refills: 0 | Status: SHIPPED | OUTPATIENT
Start: 2022-05-03 | End: 2022-06-30

## 2022-05-03 RX ORDER — ATORVASTATIN CALCIUM 10 MG/1
10 TABLET, FILM COATED ORAL DAILY
Qty: 30 TABLET | Refills: 0 | Status: SHIPPED | OUTPATIENT
Start: 2022-05-03 | End: 2022-06-30

## 2022-05-03 NOTE — TELEPHONE ENCOUNTER
Routing refill request to provider for review/approval because:  Labs not current:  creatinine, a1c  Patient needs to be seen because it has been more than 6 months since last office visit.  Atorvastatin was discontinued on 4/21/2020, please advise if refill is appropriate.     Marcelle Campos RN, BSN  [facility]

## 2022-05-03 NOTE — TELEPHONE ENCOUNTER
Medications refilled for one month- assist with scheduling fasting labs and follow up with me - no further refills without appointments scheduled

## 2022-05-04 NOTE — TELEPHONE ENCOUNTER
Called pt and LVM asking him to call back to schedule appt with CB prior to any additional refills.

## 2022-06-30 ENCOUNTER — TELEPHONE (OUTPATIENT)
Dept: FAMILY MEDICINE | Facility: CLINIC | Age: 42
End: 2022-06-30

## 2022-06-30 ENCOUNTER — VIRTUAL VISIT (OUTPATIENT)
Dept: FAMILY MEDICINE | Facility: CLINIC | Age: 42
End: 2022-06-30
Payer: COMMERCIAL

## 2022-06-30 DIAGNOSIS — E11.65 TYPE 2 DIABETES MELLITUS WITH HYPERGLYCEMIA, WITH LONG-TERM CURRENT USE OF INSULIN (H): Primary | ICD-10-CM

## 2022-06-30 DIAGNOSIS — E87.6 HYPOKALEMIA: ICD-10-CM

## 2022-06-30 DIAGNOSIS — Z79.4 TYPE 2 DIABETES MELLITUS WITH HYPERGLYCEMIA, WITH LONG-TERM CURRENT USE OF INSULIN (H): Primary | ICD-10-CM

## 2022-06-30 DIAGNOSIS — Z11.4 SCREENING FOR HIV (HUMAN IMMUNODEFICIENCY VIRUS): ICD-10-CM

## 2022-06-30 DIAGNOSIS — Z13.220 SCREENING FOR HYPERLIPIDEMIA: ICD-10-CM

## 2022-06-30 DIAGNOSIS — Z11.59 NEED FOR HEPATITIS C SCREENING TEST: ICD-10-CM

## 2022-06-30 PROCEDURE — 99214 OFFICE O/P EST MOD 30 MIN: CPT | Mod: 95 | Performed by: PHYSICIAN ASSISTANT

## 2022-06-30 RX ORDER — LISINOPRIL 10 MG/1
10 TABLET ORAL DAILY
Qty: 30 TABLET | Refills: 0 | Status: SHIPPED | OUTPATIENT
Start: 2022-06-30 | End: 2022-07-07

## 2022-06-30 RX ORDER — FLURBIPROFEN SODIUM 0.3 MG/ML
SOLUTION/ DROPS OPHTHALMIC
Qty: 30 EACH | Refills: 1 | Status: SHIPPED | OUTPATIENT
Start: 2022-06-30

## 2022-06-30 RX ORDER — GLIPIZIDE 5 MG/1
5 TABLET, FILM COATED, EXTENDED RELEASE ORAL DAILY
Qty: 30 TABLET | Refills: 0 | Status: SHIPPED | OUTPATIENT
Start: 2022-06-30

## 2022-06-30 RX ORDER — ATORVASTATIN CALCIUM 10 MG/1
10 TABLET, FILM COATED ORAL DAILY
Qty: 30 TABLET | Refills: 0 | Status: SHIPPED | OUTPATIENT
Start: 2022-06-30 | End: 2022-07-07

## 2022-06-30 RX ORDER — POTASSIUM CHLORIDE 750 MG/1
10 TABLET, EXTENDED RELEASE ORAL DAILY
Qty: 30 TABLET | Refills: 0 | Status: SHIPPED | OUTPATIENT
Start: 2022-06-30 | End: 2022-07-07

## 2022-06-30 NOTE — PROGRESS NOTES
David is a 42 year old who is being evaluated via a billable video visit.      How would you like to obtain your AVS? Mail a copy  If the video visit is dropped, the invitation should be resent by: Text to cell phone: [phone]  Will anyone else be joining your video visit? No          Assessment & Plan     Type 2 diabetes mellitus with hyperglycemia, with long-term current use of insulin (H)  I do not have an A1C since 3/26/21 and at that time diabetes was at goal  He states he has not been taking the lipitor but taking other medications as listed below  I have given one month for fasting labs.  I will not refill medications without fasting labs  Increased lisinopril from 2.5 mg to 10 mg daily to protect kidneys    - OPTOMETRY REFERRAL  - Hemoglobin A1c  - Comprehensive metabolic panel (BMP + Alb, Alk Phos, ALT, AST, Total. Bili, TP)  - Albumin Random Urine Quantitative with Creat Ratio  - lisinopril (ZESTRIL) 10 MG tablet  Dispense: 30 tablet; Refill: 0  - atorvastatin (LIPITOR) 10 MG tablet  Dispense: 30 tablet; Refill: 0  - insulin pen needle (B-D U/F) 31G X 5 MM miscellaneous  Dispense: 30 each; Refill: 1  - glipiZIDE (GLUCOTROL XL) 5 MG 24 hr tablet  Dispense: 30 tablet; Refill: 0  - insulin detemir (LEVEMIR FLEXPEN/FLEXTOUCH) 100 UNIT/ML pen  Dispense: 15 mL; Refill: 0    Screening for hyperlipidemia  He states he has not been taking lipitor as prescribed  - Lipid panel reflex to direct LDL Fasting    Hypokalemia  He states he has been taking potassium   - potassium chloride ER (K-TAB/KLOR-CON) 10 MEQ CR tablet  Dispense: 30 tablet; Refill: 0    Screening for HIV (human immunodeficiency virus)  He is undecided if he wants this done- advised to notify lab if doesn't want this test done  - HIV Antigen Antibody Combo    Need for hepatitis C screening test  Definitely wants this testing   - Hepatitis C Screen Reflex to HCV RNA Quant and Genotype      Ordering of each unique test  Prescription drug  management  33 minutes spent on the date of the encounter doing chart review, history and exam, documentation and further activities per the note       Patient Instructions     I have refilled your medications for one month- I cannot refill your medications without labs done    Please schedule a fasting lab appointment (nothing to eat or drink 9 hours prior except water and/or your medications) at your earliest convenience along with a MA (MEDICAL ASSISTANT) visit at that time to check your blood pressure and make sure your diabetes is at goal     Schedule your eye exam at [facility]    As soon as possible- it has been more than a year since you have seen the eye doctor  [facility]   [address] [address] [address]   Phone: [phone]     Schedule a face to face visit with me in clinic in 3 months       Return in about 3 months (around 9/30/2022), or if symptoms worsen or fail to improve, for in person.    Ly Means PA-C  [facility] [place]    Subjective   Ingalls is a 42 year old, presenting for the following health issues:  No chief complaint on file.      HPI       Diabetes Follow-up    How often are you checking your blood sugar? A few times a week  What time of day are you checking your blood sugars (select all that apply)?  Before meals  Have you had any blood sugars above 200?  No  Have you had any blood sugars below 70?  No    What symptoms do you notice when your blood sugar is low?  None and Not applicable    What concerns do you have today about your diabetes?          Do you have any of these symptoms? (Select all that apply)  No numbness or tingling in feet.  No redness, sores or blisters on feet.  No complaints of excessive thirst.  No reports of blurry vision.  No significant changes to weight.    Have you had a diabetic eye exam in the last 12 months? Yes-      Last eye exam 6/10/21-has not been seen by optometry since                    BP Readings from Last 2 Encounters:   03/26/21 120/64   09/05/19 121/80     Hemoglobin A1C POCT (%)   Date Value   03/26/2021 6.7 (H)   12/08/2020 10.9 (H)     LDL Cholesterol Calculated (mg/dL)   Date Value   12/08/2020 125 (H)   09/05/2019 101 (H)     LDL Cholesterol Direct (mg/dL)   Date Value   03/26/2021 68       Diabetes Follow-up          BP Readings from Last 2 Encounters:   03/26/21 120/64   09/05/19 121/80     Hemoglobin A1C POCT (%)   Date Value   03/26/2021 6.7 (H)   12/08/2020 10.9 (H)     LDL Cholesterol Calculated (mg/dL)   Date Value   12/08/2020 125 (H)   09/05/2019 101 (H)     LDL Cholesterol Direct (mg/dL)   Date Value   03/26/2021 68                 Takes 20 units insulin at night   Blood sugars 135, 120  Feesl ok  Still working- [employer] -    Reports has not been taking lipitor 10 mg   Glipizide 5 mg  Insulin 20 units   Is taking lisinopril 2.5 mg and potassium       Review of Systems   Constitutional, HEENT, cardiovascular, pulmonary, gi and gu systems are negative, except as otherwise noted.      Objective           Vitals:  No vitals were obtained today due to virtual visit.    Physical Exam   GENERAL: Healthy, alert and no distress  EYES: Eyes grossly normal to inspection.  No discharge or erythema, or obvious scleral/conjunctival abnormalities.  RESP: No audible wheeze, cough, or visible cyanosis.  No visible retractions or increased work of breathing.    SKIN: Visible skin clear. No significant rash, abnormal pigmentation or lesions.  NEURO: Cranial nerves grossly intact.  Mentation and speech appropriate for age.  PSYCH: Mentation appears normal, affect normal/bright, judgement and insight intact, normal speech and appearance well-groomed.                Video-Visit Details    Video Start Time: 327 pm     Type of service:  Video Visit    Video End Time:3:36 PM    Originating Location (pt. Location): Other work    Distant Location  (provider location):  [facility]     Platform used for Video Visit: [employer]

## 2022-06-30 NOTE — PATIENT INSTRUCTIONS
I have refilled your medications for one month- I cannot refill your medications without labs done    Please schedule a fasting lab appointment (nothing to eat or drink 9 hours prior except water and/or your medications) at your earliest convenience along with a MA (MEDICAL ASSISTANT) visit at that time to check your blood pressure and make sure your diabetes is at goal     Schedule your eye exam at [facility]    As soon as possible- it has been more than a year since you have seen the eye doctor  [facility]   [address] [facility] [address]   Phone: [phone]     Schedule a face to face visit with me in clinic in 3 months

## 2022-06-30 NOTE — TELEPHONE ENCOUNTER
LVM for pt to schedule, per provider request    assist with scheduling fasting labs and advise to fu eye doctor and face to face visit with me in clinic in 3 months- 40 minute same day ok - CB

## 2022-07-06 ENCOUNTER — OFFICE VISIT (OUTPATIENT)
Dept: OPTOMETRY | Facility: CLINIC | Age: 42
End: 2022-07-06
Payer: COMMERCIAL

## 2022-07-06 DIAGNOSIS — E11.9 TYPE 2 DIABETES MELLITUS WITHOUT COMPLICATION, WITHOUT LONG-TERM CURRENT USE OF INSULIN (H): Primary | ICD-10-CM

## 2022-07-06 DIAGNOSIS — H52.4 PRESBYOPIA: ICD-10-CM

## 2022-07-06 DIAGNOSIS — H52.223 REGULAR ASTIGMATISM OF BOTH EYES: ICD-10-CM

## 2022-07-06 DIAGNOSIS — H52.03 HYPEROPIA, BILATERAL: ICD-10-CM

## 2022-07-06 PROCEDURE — 92015 DETERMINE REFRACTIVE STATE: CPT | Performed by: OPTOMETRIST

## 2022-07-06 PROCEDURE — 92014 COMPRE OPH EXAM EST PT 1/>: CPT | Performed by: OPTOMETRIST

## 2022-07-06 ASSESSMENT — REFRACTION_WEARINGRX
OD_CYLINDER: +0.25
OD_ADD: +1.50
OS_CYLINDER: SPHERE
OD_ADD: +1.50
OS_ADD: +1.50
OD_SPHERE: +0.75
OS_AXIS: 169
OD_SPHERE: +0.75
OD_CYLINDER: SPHERE
OS_SPHERE: +0.75
OD_AXIS: 016
OS_CYLINDER: +0.25
OS_ADD: +1.50
OS_SPHERE: +0.75

## 2022-07-06 ASSESSMENT — VISUAL ACUITY
CORRECTION_TYPE: GLASSES
OS_SC: 20/50
OS_CC+: -3
OS_CC: 20/25
OD_SC+: -1
METHOD: SNELLEN - LINEAR
OD_CC: 20/100
OD_CC: 20/30
OS_CC: 20/70
OS_SC+: -2
OD_CC+: -1
OD_SC: 20/80

## 2022-07-06 ASSESSMENT — REFRACTION_MANIFEST
OD_SPHERE: +1.75
OS_SPHERE: +1.50
OD_ADD: +1.50
OD_CYLINDER: SPHERE
OS_ADD: +1.50
OS_CYLINDER: SPHERE

## 2022-07-06 ASSESSMENT — CUP TO DISC RATIO
OD_RATIO: 0.3
OS_RATIO: 0.3

## 2022-07-06 ASSESSMENT — TONOMETRY
OS_IOP_MMHG: 16
OD_IOP_MMHG: 14
IOP_METHOD: TONOPEN

## 2022-07-06 ASSESSMENT — KERATOMETRY
OD_K1POWER_DIOPTERS: 40.75
OD_AXISANGLE2_DEGREES: 180
OD_AXISANGLE_DEGREES: 090
OS_K2POWER_DIOPTERS: 40.75
OD_K2POWER_DIOPTERS: 40.75
OS_K1POWER_DIOPTERS: 40.75
OS_AXISANGLE2_DEGREES: 180
OS_AXISANGLE_DEGREES: 090

## 2022-07-06 ASSESSMENT — SLIT LAMP EXAM - LIDS
COMMENTS: NORMAL
COMMENTS: NORMAL

## 2022-07-06 ASSESSMENT — EXTERNAL EXAM - LEFT EYE: OS_EXAM: NORMAL

## 2022-07-06 ASSESSMENT — CONF VISUAL FIELD
OD_NORMAL: 1
OS_NORMAL: 1

## 2022-07-06 ASSESSMENT — EXTERNAL EXAM - RIGHT EYE: OD_EXAM: NORMAL

## 2022-07-06 NOTE — LETTER
7/6/2022         RE: David Roberts  [address]  [facility] [address]        Dear Colleague,    Thank you for referring your patient, David Roberts, to the [facility]. Please see a copy of my visit note below.    Chief Complaint   Patient presents with     Diabetic Eye Exam     Accompanied by self  Chief Complaint(s) and History of Present Illness(es)     Diabetic Eye Exam     Vision: is stable    Diabetes Type: Type 2, on insulin and taking oral medications    Duration: 10 years    Blood Sugars: fluctuates               Lab Results   Component Value Date    A1C 6.7 03/26/2021    A1C 10.9 12/08/2020    A1C 8.6 09/05/2019    A1C 8.4 08/29/2018    A1C 13.2 04/26/2018            Last Eye Exam: 6-  Dilated Previously: Yes    What are you currently using to see?  glasses    Distance Vision Acuity: Satisfied with vision    Near Vision Acuity: Satisfied with vision while reading  with glasses    Eye Comfort: aching x 1 week- due to blurry vision- maybe glasses have changed  Do you use eye drops? 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     Medical, surgical and family histories reviewed and updated 7/6/2022.       OBJECTIVE: See Ophthalmology exam    ASSESSMENT:    ICD-10-CM    1. Type 2 diabetes mellitus without complication, without long-term current use of insulin (H)  E11.9 EYE EXAM (SIMPLE-NONBILLABLE)    Negative diabetic retinopathy   2. Presbyopia  H52.4 REFRACTION   3. Hyperopia, bilateral  H52.03 REFRACTION   4. Regular astigmatism of both eyes  H52.223 REFRACTION       PLAN:    David JOSE Armando aware  eye exam results will be sent to Ly Means  Patient Instructions   There are not any signs of the diabetes affecting the eyes today.  It is important that you get your eyes dilated once yearly and keep good control of your diabetes.    Recommend new glasses.    Return in 1 year for a complete  eye exam or sooner if needed.    Chris Calle, OD               Again, thank you for allowing me to participate in the care of your patient.        Sincerely,        Chris Calle, OD

## 2022-07-06 NOTE — PROGRESS NOTES
Chief Complaint   Patient presents with     Diabetic Eye Exam     Accompanied by self  Chief Complaint(s) and History of Present Illness(es)     Diabetic Eye Exam     Vision: is stable    Diabetes Type: Type 2, on insulin and taking oral medications    Duration: 10 years    Blood Sugars: fluctuates               Lab Results   Component Value Date    A1C 6.7 03/26/2021    A1C 10.9 12/08/2020    A1C 8.6 09/05/2019    A1C 8.4 08/29/2018    A1C 13.2 04/26/2018            Last Eye Exam: 6-  Dilated Previously: Yes    What are you currently using to see?  glasses    Distance Vision Acuity: Satisfied with vision    Near Vision Acuity: Satisfied with vision while reading  with glasses    Eye Comfort: aching x 1 week- due to blurry vision- maybe glasses have changed  Do you use eye drops? 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     Medical, surgical and family histories reviewed and updated 7/6/2022.       OBJECTIVE: See Ophthalmology exam    ASSESSMENT:    ICD-10-CM    1. Type 2 diabetes mellitus without complication, without long-term current use of insulin (H)  E11.9 EYE EXAM (SIMPLE-NONBILLABLE)    Negative diabetic retinopathy   2. Presbyopia  H52.4 REFRACTION   3. Hyperopia, bilateral  H52.03 REFRACTION   4. Regular astigmatism of both eyes  H52.223 REFRACTION       PLAN:    David Roberts aware  eye exam results will be sent to Ly Means  Patient Instructions   There are not any signs of the diabetes affecting the eyes today.  It is important that you get your eyes dilated once yearly and keep good control of your diabetes.    Recommend new glasses.    Return in 1 year for a complete eye exam or sooner if needed.    Chris Calle, OD

## 2022-07-06 NOTE — PATIENT INSTRUCTIONS
There are not any signs of the diabetes affecting the eyes today.  It is important that you get your eyes dilated once yearly and keep good control of your diabetes.    Recommend new glasses.    Return in 1 year for a complete eye exam or sooner if needed.    Chris Calle, GARY    The affects of the dilating drops last for 4- 6 hours.  You will be more sensitive to light and vision will be blurry up close.  Do not drive if you do not feel comfortable.  Mydriatic sunglasses were given if needed.    Patient Education   Diabetes weakens the blood vessels all over the body, including the eyes. Damage to the blood vessels in the eyes can cause swelling or bleeding into part of the eye (called the retina). This is called diabetic retinopathy (ART-tin-AH-puh-thee). If not treated, this disease can cause vision loss or blindness.   Symptoms may include blurred or distorted vision, but many people have no symptoms. It's important to see your eye doctor regularly to check for problems.   Early treatment and good control can help protect your vision. Here are the things you can do to help prevent vision loss:      1. Keep your blood sugar levels under tight control.      2. Bring high blood pressure under control.      3. No smoking.      4. Have yearly dilated eye exams.         Optometry Providers       Clinic Locations                                 Telephone Number   Dr. Sayra Aaronlyn Park/Shan[place] [phone]     [facility] Hours:                [facility] Hours:       [facility] Hours:   [address]   [address]     [address] [place] [address]   [place] [address]    [place] [address]  Phone: [phone]                    Phone: [phone]     Phone: [phone]                      Monday 8:00-6:00                          Monday 8:00-6:00                           Monday 8:00-6:00              Tuesday 8:00-6:00                          Tuesday 8:00-6:00                          Tuesday 8:00-6:00              Wednesday 8:00-6:00                  Wednesday 8:00-6:00                   Wednesday 8:00-6:00      Thursday 8:00-6:00                        Thursday 8:00-6:00                         Thursday 8:00-6:00            Friday 8:00-5:00                              Friday 8:00-5:00                              Friday 8:00-5:00    [facility] Hours:   [address] [place] [address]  [phone]    Monday 9:00-6:00  Tuesday 9:00-6:00  Wednesday 9:00-6:00  Thursday 9:00-6:00  Friday 9:00-5:00  Please log on to [email or web address] to order your contact lenses.  The link is found on the [facility] page.  As always, Thank you for trusting us with your health care needs!

## 2022-07-07 ENCOUNTER — LAB (OUTPATIENT)
Dept: LAB | Facility: CLINIC | Age: 42
End: 2022-07-07
Payer: COMMERCIAL

## 2022-07-07 DIAGNOSIS — E87.6 HYPOKALEMIA: ICD-10-CM

## 2022-07-07 DIAGNOSIS — Z79.4 TYPE 2 DIABETES MELLITUS WITH HYPERGLYCEMIA, WITH LONG-TERM CURRENT USE OF INSULIN (H): ICD-10-CM

## 2022-07-07 DIAGNOSIS — Z13.220 SCREENING FOR HYPERLIPIDEMIA: ICD-10-CM

## 2022-07-07 DIAGNOSIS — E11.65 TYPE 2 DIABETES MELLITUS WITH HYPERGLYCEMIA, WITH LONG-TERM CURRENT USE OF INSULIN (H): ICD-10-CM

## 2022-07-07 DIAGNOSIS — Z11.4 SCREENING FOR HIV (HUMAN IMMUNODEFICIENCY VIRUS): ICD-10-CM

## 2022-07-07 DIAGNOSIS — Z11.59 NEED FOR HEPATITIS C SCREENING TEST: ICD-10-CM

## 2022-07-07 LAB
ALBUMIN SERPL-MCNC: 4 G/DL (ref 3.4–5)
ALP SERPL-CCNC: 69 U/L (ref 40–150)
ALT SERPL W P-5'-P-CCNC: 30 U/L (ref 0–70)
ANION GAP SERPL CALCULATED.3IONS-SCNC: 3 MMOL/L (ref 3–14)
AST SERPL W P-5'-P-CCNC: 20 U/L (ref 0–45)
BILIRUB SERPL-MCNC: 0.7 MG/DL (ref 0.2–1.3)
BUN SERPL-MCNC: 11 MG/DL (ref 7–30)
CALCIUM SERPL-MCNC: 9.5 MG/DL (ref 8.5–10.1)
CHLORIDE BLD-SCNC: 106 MMOL/L (ref 94–109)
CHOLEST SERPL-MCNC: 146 MG/DL
CO2 SERPL-SCNC: 32 MMOL/L (ref 20–32)
CREAT SERPL-MCNC: 1.35 MG/DL (ref 0.66–1.25)
CREAT UR-MCNC: 250 MG/DL
FASTING STATUS PATIENT QL REPORTED: YES
GFR SERPL CREATININE-BSD FRML MDRD: 67 ML/MIN/1.73M2
GLUCOSE BLD-MCNC: 88 MG/DL (ref 70–99)
HBA1C MFR BLD: 10 % (ref 0–5.6)
HDLC SERPL-MCNC: 42 MG/DL
LDLC SERPL CALC-MCNC: 87 MG/DL
MICROALBUMIN UR-MCNC: 8 MG/L
MICROALBUMIN/CREAT UR: 3.2 MG/G CR (ref 0–17)
NONHDLC SERPL-MCNC: 104 MG/DL
POTASSIUM BLD-SCNC: 4.2 MMOL/L (ref 3.4–5.3)
PROT SERPL-MCNC: 7 G/DL (ref 6.8–8.8)
SODIUM SERPL-SCNC: 141 MMOL/L (ref 133–144)
TRIGL SERPL-MCNC: 85 MG/DL

## 2022-07-07 PROCEDURE — 80053 COMPREHEN METABOLIC PANEL: CPT

## 2022-07-07 PROCEDURE — 82043 UR ALBUMIN QUANTITATIVE: CPT

## 2022-07-07 PROCEDURE — 83036 HEMOGLOBIN GLYCOSYLATED A1C: CPT

## 2022-07-07 PROCEDURE — 86803 HEPATITIS C AB TEST: CPT

## 2022-07-07 PROCEDURE — 80061 LIPID PANEL: CPT

## 2022-07-07 PROCEDURE — 36415 COLL VENOUS BLD VENIPUNCTURE: CPT

## 2022-07-07 PROCEDURE — 87389 HIV-1 AG W/HIV-1&-2 AB AG IA: CPT

## 2022-07-07 RX ORDER — LISINOPRIL 10 MG/1
10 TABLET ORAL DAILY
Qty: 90 TABLET | Refills: 0 | Status: SHIPPED | OUTPATIENT
Start: 2022-07-07 | End: 2023-09-18

## 2022-07-07 RX ORDER — POTASSIUM CHLORIDE 750 MG/1
10 TABLET, EXTENDED RELEASE ORAL DAILY
Qty: 90 TABLET | Refills: 0 | Status: SHIPPED | OUTPATIENT
Start: 2022-07-07

## 2022-07-07 RX ORDER — GLIPIZIDE 10 MG/1
10 TABLET, FILM COATED, EXTENDED RELEASE ORAL DAILY
Qty: 90 TABLET | Refills: 0 | Status: SHIPPED | OUTPATIENT
Start: 2022-07-07 | End: 2022-10-06

## 2022-07-07 RX ORDER — ATORVASTATIN CALCIUM 10 MG/1
10 TABLET, FILM COATED ORAL DAILY
Qty: 90 TABLET | Refills: 3 | Status: SHIPPED | OUTPATIENT
Start: 2022-07-07 | End: 2023-09-18

## 2022-07-08 ENCOUNTER — TELEPHONE (OUTPATIENT)
Dept: FAMILY MEDICINE | Facility: CLINIC | Age: 42
End: 2022-07-08

## 2022-07-08 LAB
HCV AB SERPL QL IA: NONREACTIVE
HIV 1+2 AB+HIV1 P24 AG SERPL QL IA: NONREACTIVE

## 2022-07-08 NOTE — TELEPHONE ENCOUNTER
----- Message from Celeste Fisher sent at 7/8/2022  7:49 AM CDT -----    ----- Message -----  From: Ly Means PA-C  Sent: 7/7/2022   4:13 PM CDT  To: [facility]    Please call and advise that A1C was 10.0- diabetes not controlled at all   I have sent over refills of medications- Schedule follow up with diabetes educator as soon as possible as well as increase glipizide from 5 mg to 10 mg daily - schedule MA (MEDICAL ASSISTANT) blood pressure check and follow up with me in clinic in 3 months  I do not want to increase insulin since blood sugar was 88 at the time we checked it.   Kidney function is a bit decreased- make sure drinking plenty of water and avoid nsaids like ibuprofen and aleve.

## 2022-07-08 NOTE — TELEPHONE ENCOUNTER
Patient called back, gave message per Ly Means PA-C.  Patient verbalized understanding and agreement to plan.     Offered to schedule appointment with MA for BP check and Ly Means PA-C however patient declined and said he would call back later.      Ioana Peace RN, [facility]

## 2022-07-08 NOTE — TELEPHONE ENCOUNTER
Left message for patient to return call.     RN please give provider message as written.    Genevieve Cummings RN

## 2022-07-11 ENCOUNTER — TELEPHONE (OUTPATIENT)
Dept: FAMILY MEDICINE | Facility: CLINIC | Age: 42
End: 2022-07-11

## 2022-07-18 NOTE — TELEPHONE ENCOUNTER
Diabetes Education Scheduling Outreach #2:    Call to patient to schedule. Left message with phone number to call to schedule.    Jo Philippe  [facility]  Diabetes and Nutrition Scheduling

## 2022-08-04 DIAGNOSIS — E11.65 TYPE 2 DIABETES MELLITUS WITH HYPERGLYCEMIA, WITH LONG-TERM CURRENT USE OF INSULIN (H): ICD-10-CM

## 2022-08-04 DIAGNOSIS — Z79.4 TYPE 2 DIABETES MELLITUS WITH HYPERGLYCEMIA, WITH LONG-TERM CURRENT USE OF INSULIN (H): ICD-10-CM

## 2022-08-04 RX ORDER — LISINOPRIL 2.5 MG/1
TABLET ORAL
Qty: 90 TABLET | Refills: 2 | OUTPATIENT
Start: 2022-08-04

## 2022-10-06 ENCOUNTER — TELEPHONE (OUTPATIENT)
Dept: FAMILY MEDICINE | Facility: CLINIC | Age: 42
End: 2022-10-06

## 2022-10-06 DIAGNOSIS — E11.65 TYPE 2 DIABETES MELLITUS WITH HYPERGLYCEMIA, WITH LONG-TERM CURRENT USE OF INSULIN (H): ICD-10-CM

## 2022-10-06 DIAGNOSIS — Z79.4 TYPE 2 DIABETES MELLITUS WITH HYPERGLYCEMIA, WITH LONG-TERM CURRENT USE OF INSULIN (H): ICD-10-CM

## 2022-10-06 RX ORDER — GLIPIZIDE 10 MG/1
TABLET, FILM COATED, EXTENDED RELEASE ORAL
Qty: 90 TABLET | OUTPATIENT
Start: 2022-10-06

## 2022-10-06 NOTE — TELEPHONE ENCOUNTER
No I will not federico a 90 day - call pharmacy and only federico 30 day supply last A1C of 10.0 and no followup scheduled

## 2022-10-06 NOTE — TELEPHONE ENCOUNTER
Staff: Please call pt and assist scheduling appointment per provider documentation within additional 10/6/22 encounter:  Ly Means PA-C    10/6/22 11:52 AM  Note  Given one month- no further refills face to face visit - assist with scheduling - same day ok             RN contacted pharmacy and notified pharmacy that pt needs to be seen and no additional refills or larger quantity will be given until pt is seen for appointment.    glipiZIDE (GLUCOTROL XL) 10 MG 24 hr tablet 30 tablet 0 10/6/2022  No   Sig - Route: Take 1 tablet (10 mg) by mouth daily ++NO FURTHER REFILLS WITHOUT FACE TO FACE VISIT++     Ioana Williamson, KHRISN, RN

## 2022-11-08 NOTE — TELEPHONE ENCOUNTER
Provider:  Patient had a CXR last week.  Looking for results. Thank you. Carlene Cano R.N.    
RN:  Please relay message from the provider below and assist in scheduling a virtual appointment.  Thank you. Carlene Cano R.N.    
This writer attempted to contact [employer] on 10/25/21      Reason for call results and virtual appt and left message to give the clinic a return call and ask to speak to a nurse.       If patient calls back:   Relay message from provider, (read verbatim), document that pt called and close encounter        Abbey Alexis RN    
Upon looking at chart review tab, patient had a virtual appointment already, this line of messaging to reach patient about needing an appointment was from 10/18 and he had a virtual appointment on 10/19. Will close encounter as this was already completed.     Abbey Alexis RN    
Xray was normal - needs a virtual visit to discuss and next steps   
Written order for extubation verified. The patient was identified by full name and birth date compared to the identification band. Present during the procedure was Terrell BARLOW

## 2023-02-13 ENCOUNTER — OFFICE VISIT (OUTPATIENT)
Dept: URGENT CARE | Facility: URGENT CARE | Age: 43
End: 2023-02-13
Payer: COMMERCIAL

## 2023-02-13 VITALS
SYSTOLIC BLOOD PRESSURE: 121 MMHG | OXYGEN SATURATION: 99 % | HEART RATE: 72 BPM | BODY MASS INDEX: 22.6 KG/M2 | TEMPERATURE: 97.8 F | DIASTOLIC BLOOD PRESSURE: 74 MMHG | WEIGHT: 164.3 LBS

## 2023-02-13 DIAGNOSIS — R35.0 URINARY FREQUENCY: Primary | ICD-10-CM

## 2023-02-13 DIAGNOSIS — E11.00 DM HYPEROSMOLARITY TYPE II, UNCONTROLLED (H): ICD-10-CM

## 2023-02-13 LAB
ALBUMIN UR-MCNC: NEGATIVE MG/DL
ANION GAP SERPL CALCULATED.3IONS-SCNC: 10 MMOL/L (ref 3–14)
APPEARANCE UR: CLEAR
BILIRUB UR QL STRIP: NEGATIVE
BUN SERPL-MCNC: 15 MG/DL (ref 7–30)
CALCIUM SERPL-MCNC: 9.4 MG/DL (ref 8.5–10.1)
CHLORIDE BLD-SCNC: 97 MMOL/L (ref 94–109)
CO2 SERPL-SCNC: 33 MMOL/L (ref 20–32)
COLOR UR AUTO: YELLOW
CREAT SERPL-MCNC: 0.9 MG/DL (ref 0.66–1.25)
ERYTHROCYTE [DISTWIDTH] IN BLOOD BY AUTOMATED COUNT: 12.2 % (ref 10–15)
GFR SERPL CREATININE-BSD FRML MDRD: >90 ML/MIN/1.73M2
GLUCOSE BLD-MCNC: 377 MG/DL (ref 60–99)
GLUCOSE BLD-MCNC: 380 MG/DL (ref 70–99)
GLUCOSE UR STRIP-MCNC: >=1000 MG/DL
HBA1C MFR BLD: >15 % (ref 0–5.6)
HCT VFR BLD AUTO: 44.7 % (ref 40–53)
HGB BLD-MCNC: 15.4 G/DL (ref 13.3–17.7)
HGB UR QL STRIP: NEGATIVE
KETONES UR STRIP-MCNC: NEGATIVE MG/DL
LEUKOCYTE ESTERASE UR QL STRIP: NEGATIVE
MCH RBC QN AUTO: 26.3 PG (ref 26.5–33)
MCHC RBC AUTO-ENTMCNC: 34.5 G/DL (ref 31.5–36.5)
MCV RBC AUTO: 76 FL (ref 78–100)
NITRATE UR QL: NEGATIVE
PH UR STRIP: 6 [PH] (ref 5–7)
PLATELET # BLD AUTO: 213 10E3/UL (ref 150–450)
POTASSIUM BLD-SCNC: 4.5 MMOL/L (ref 3.4–5.3)
RBC # BLD AUTO: 5.86 10E6/UL (ref 4.4–5.9)
SODIUM SERPL-SCNC: 140 MMOL/L (ref 133–144)
SP GR UR STRIP: <=1.005 (ref 1–1.03)
UROBILINOGEN UR STRIP-ACNC: 0.2 E.U./DL
WBC # BLD AUTO: 3.8 10E3/UL (ref 4–11)

## 2023-02-13 PROCEDURE — 83036 HEMOGLOBIN GLYCOSYLATED A1C: CPT | Performed by: EMERGENCY MEDICINE

## 2023-02-13 PROCEDURE — 80048 BASIC METABOLIC PNL TOTAL CA: CPT | Performed by: EMERGENCY MEDICINE

## 2023-02-13 PROCEDURE — 85027 COMPLETE CBC AUTOMATED: CPT | Performed by: EMERGENCY MEDICINE

## 2023-02-13 PROCEDURE — 99214 OFFICE O/P EST MOD 30 MIN: CPT | Performed by: EMERGENCY MEDICINE

## 2023-02-13 PROCEDURE — 82947 ASSAY GLUCOSE BLOOD QUANT: CPT | Mod: 59 | Performed by: EMERGENCY MEDICINE

## 2023-02-13 PROCEDURE — 81003 URINALYSIS AUTO W/O SCOPE: CPT | Performed by: EMERGENCY MEDICINE

## 2023-02-13 PROCEDURE — 36415 COLL VENOUS BLD VENIPUNCTURE: CPT | Performed by: EMERGENCY MEDICINE

## 2023-02-13 NOTE — PROGRESS NOTES
Assessment & Plan     Diagnosis:  (R35.0) Urinary frequency  (primary encounter diagnosis)    (E11.00,  E11.65) DM hyperosmolarity type II, uncontrolled (H)  Plan: Glucose, whole blood, Basic metabolic panel          (Ca, Cl, CO2, Creat, Gluc, K, Na, BUN), CBC         with platelets, Hemoglobin A1c, metFORMIN         (GLUCOPHAGE) 500 MG tablet, Basic metabolic         panel      Medical Decision Making:  David Roberts is a 42 year old male who presents for evaluation of urinary ferquency and concerns for diabetes over the past 3 months. Patient has not been taking his diabetes medications or checking his blood glucose levels.  Patient is overall well-appearing here, he has no other complaints other than urinary frequency.  UA shows no signs of infection, there is over thousand glucose noted but no ketones.  Patient has a benign abdominal exam, no complaints of chest pain, shortness of breath, vision changes, severe headaches, lightheadedness, near syncope or other concerns for DKA.  Blood sugar is elevated at 377, hemoglobin A1c is over 15.  Patient has not been on diabetes medications including insulin and glipizide for the past 3 months.  BMP demonstrates normal kidney function and normal anion gap.    Given symptoms have been ongoing for months and there is no clinical concerns for DKA, nefarious intra-abdominal process or UTI/pyelonephritis, I discussed re-starting Metformin, (states he tolerated this better in the past), and will follow up with PCP at the end of the week for diabetes management.    Patient voices understanding and agreement with the plan including reasons to go to the ER immediately as well as to be seen by a more consistent care-giver, such as their PCP, if the symptoms persist more than 2 days.     Chon You PA-C  [facility]    Subjective     David Roberts is a 42 year old male who presents to clinic today for the following health issues:  Chief Complaint  Left a message for patient to call back.    "  Patient presents with     Urgent Care     Urinary Frequency     Since December        HPI  Patient states that has been experiencing urinary frequency for the last 3 months which has been a constant issue for him.  He does have a history of type 2 diabetes, is no longer taking any of his medications including insulin, metformin or glipizide.  States that he was not tolerating them, metformin was \"okay.\"  He has had some slight abdominal pains intermittently over the months, but nothing notable.  He states that he does not have any pain urinating, no dark or bloody urine, diarrhea, constipation, back or flank pain, vision changes, headache, lightheadedness, dizziness, chest pain, shortness of breath or syncopal episodes.    Review of Systems    See HPI    Objective      Vitals: /74 (BP Location: Left arm, Patient Position: Sitting, Cuff Size: Adult Regular)   Pulse 72   Temp 97.8  F (36.6  C) (Tympanic)   Wt 74.5 kg (164 lb 4.8 oz)   SpO2 99%   BMI 22.60 kg/m    Resp: 18    Patient Vitals for the past 24 hrs:   BP Temp Temp src Pulse SpO2 Weight   02/13/23 1045 121/74 97.8  F (36.6  C) Tympanic 72 99 % 74.5 kg (164 lb 4.8 oz)       Vital signs reviewed by: Chon You PA-C    Physical Exam   Constitutional: Patient is alert and cooperative. No acute distress.  Mouth: Mucous membranes are moist. Normal tongue and tonsil. Posterior oropharynx is clear.  Eyes: Conjunctivae, EOMI and lids are normal.  Cardiovascular: Regular rate and rhythm  Pulmonary/Chest: Lungs are clear to auscultation throughout. Effort normal. No respiratory distress. No wheezes, rales or rhonchi.  GI: Abdomen is soft and non-tender throughout. No CVA tenderness bilaterally. Normoactive bowel sounds.   Neurological: Alert and oriented x3. CN 3-7 and 9-12 intact.  Skin: No rash noted on visualized skin.  Psychiatric:The patient has a normal mood and affect.       Labs/Imaging:  Results for orders placed or performed in visit on " 02/13/23   UA Macro with Reflex to Micro and Culture - lab collect     Status: Abnormal    Specimen: Urine, Clean Catch   Result Value Ref Range    Color Urine Yellow Colorless, Straw, Light Yellow, Yellow    Appearance Urine Clear Clear    Glucose Urine >=1000 (A) Negative mg/dL    Bilirubin Urine Negative Negative    Ketones Urine Negative Negative mg/dL    Specific Gravity Urine <=1.005 1.003 - 1.035    Blood Urine Negative Negative    pH Urine 6.0 5.0 - 7.0    Protein Albumin Urine Negative Negative mg/dL    Urobilinogen Urine 0.2 0.2, 1.0 E.U./dL    Nitrite Urine Negative Negative    Leukocyte Esterase Urine Negative Negative    Narrative    Microscopic not indicated   Glucose, whole blood     Status: Abnormal   Result Value Ref Range    Glucose Whole Blood 377 (H) 60 - 99 mg/dL   CBC with platelets     Status: Abnormal   Result Value Ref Range    WBC Count 3.8 (L) 4.0 - 11.0 10e3/uL    RBC Count 5.86 4.40 - 5.90 10e6/uL    Hemoglobin 15.4 13.3 - 17.7 g/dL    Hematocrit 44.7 40.0 - 53.0 %    MCV 76 (L) 78 - 100 fL    MCH 26.3 (L) 26.5 - 33.0 pg    MCHC 34.5 31.5 - 36.5 g/dL    RDW 12.2 10.0 - 15.0 %    Platelet Count 213 150 - 450 10e3/uL   Hemoglobin A1c     Status: Abnormal   Result Value Ref Range    Hemoglobin A1C >15.0 (H) 0.0 - 5.6 %   Basic metabolic panel     Status: Abnormal   Result Value Ref Range    Sodium 140 133 - 144 mmol/L    Potassium 4.5 3.4 - 5.3 mmol/L    Chloride 97 94 - 109 mmol/L    Carbon Dioxide (CO2) 33 (H) 20 - 32 mmol/L    Anion Gap 10 3 - 14 mmol/L    Urea Nitrogen 15 7 - 30 mg/dL    Creatinine 0.90 0.66 - 1.25 mg/dL    Calcium 9.4 8.5 - 10.1 mg/dL    Glucose 380 (H) 70 - 99 mg/dL    GFR Estimate >90 >60 mL/min/1.73m2           Chon You PA-C, February 13, 2023

## 2023-02-13 NOTE — PROGRESS NOTES
"Assessment & Plan     Diagnosis:  (R35.0) Urinary frequency  (primary encounter diagnosis)  Comment: ***  Plan: UA Macro with Reflex to Micro and Culture - lab        collect, Glucose, whole blood    (E11.00,  E11.65) DM hyperosmolarity type II, uncontrolled (H)  Comment: ***  Plan: Glucose, whole blood, Basic metabolic panel          (Ca, Cl, CO2, Creat, Gluc, K, Na, BUN), CBC         with platelets, Hemoglobin A1c        Medical Decision Making:  David Roberts is a 42 year old male who presents for evaluation of ***.     Patient voices understanding and agreement with the plan including reasons to go to the ER immediately as well as to be seen by a more consistent care-giver, such as their PCP, if the symptoms persist more than *** days.       {2021 E&M time (Optional):519885}      Chon You PA-C  [facility]    Subjective     David Roberts is a 42 year old male who presents to clinic today for the following health issues:  Chief Complaint   Patient presents with     Urgent Care     Urinary Frequency     Since December        HPI    {UC Conditions (Optional):432197}    Patient describes the symptoms as \"***\"     Patient denies any ***.     Review of Systems    See HPI    Objective      Vitals: /74 (BP Location: Left arm, Patient Position: Sitting, Cuff Size: Adult Regular)   Pulse 72   Temp 97.8  F (36.6  C) (Tympanic)   Wt 74.5 kg (164 lb 4.8 oz)   SpO2 99%   BMI 22.60 kg/m    Resp: ***    Patient Vitals for the past 24 hrs:   BP Temp Temp src Pulse SpO2 Weight   02/13/23 1045 121/74 97.8  F (36.6  C) Tympanic 72 99 % 74.5 kg (164 lb 4.8 oz)       Vital signs reviewed by: Chon You PA-C    Physical Exam   Constitutional: Patient is alert and cooperative. No acute distress***.  Ears: Right TM is ***. Left TM is ***. External ear canals are normal.  Mouth: Mucous membranes are moist. Normal tongue and tonsil. Posterior oropharynx is clear.  Eyes: Conjunctivae, " EOMI and lids are normal. PERRL.  Cardiovascular: Regular rate and rhythm***  Pulmonary/Chest: Lungs are clear to auscultation throughout. Effort normal. No respiratory distress. No wheezes, rales or rhonchi.  GI: Abdomen is soft and non-tender throughout.*** No CVA tenderness bilaterally***.  Neurological: Alert and oriented x3. CN 3-7 and 9-12 intact. Strength and sensation are intact and symmetric in the bilateral upper and lower extremities.   Skin: No rash noted on visualized skin.  Psychiatric:The patient has a normal mood and affect.     Labs/Imaging:  Results for orders placed or performed in visit on 02/13/23   UA Macro with Reflex to Micro and Culture - lab collect     Status: Abnormal    Specimen: Urine, Clean Catch   Result Value Ref Range    Color Urine Yellow Colorless, Straw, Light Yellow, Yellow    Appearance Urine Clear Clear    Glucose Urine >=1000 (A) Negative mg/dL    Bilirubin Urine Negative Negative    Ketones Urine Negative Negative mg/dL    Specific Gravity Urine <=1.005 1.003 - 1.035    Blood Urine Negative Negative    pH Urine 6.0 5.0 - 7.0    Protein Albumin Urine Negative Negative mg/dL    Urobilinogen Urine 0.2 0.2, 1.0 E.U./dL    Nitrite Urine Negative Negative    Leukocyte Esterase Urine Negative Negative    Narrative    Microscopic not indicated       Interventions:    ***    Chon You PA-C, February 13, 2023

## 2023-02-22 NOTE — TELEPHONE ENCOUNTER
"Requested Prescriptions   Pending Prescriptions Disp Refills     atorvastatin (LIPITOR) 10 MG tablet [Pharmacy Med Name: ATORVASTATIN 10MG TABLETS]  Last Written Prescription Date:  9/5/19  Last Fill Quantity: 90 tablet,  # refills: 0   Last office visit: 9/5/2019 with prescribing provider:  Ly CRISTINA   Future Office Visit:     30 tablet      Sig: TAKE 1 TABLET BY MOUTH DAILY       Statins Protocol Passed - 4/16/2020  4:25 PM        Passed - LDL on file in past 12 months     Recent Labs   Lab Test 09/05/19  1004   *             Passed - No abnormal creatine kinase in past 12 months     No lab results found.             Passed - Recent (12 mo) or future (30 days) visit within the authorizing provider's specialty     Patient has had an office visit with the authorizing provider or a provider within the authorizing providers department within the previous 12 mos or has a future within next 30 days. See \"Patient Info\" tab in inbasket, or \"Choose Columns\" in Meds & Orders section of the refill encounter.              Passed - Medication is active on med list        Passed - Patient is age 18 or older             "
Letter sent to ester BENITES, Patient Care     
Med refilled. Due for diabetes check and labs- assist with scheduling virtual visit and fasting lab appointment   
Routing refill request to provider for review/approval because:  A break in medication  Rose Marie Segura RN  [facility]          
This writer attempted to contact pt on 04/21/20      Reason for call schedule virtual visit and fasting labs and left message.      If patient calls back:   Schedule Telephone Visit & fasting Lab appointment within 1 month with PCP, document that pt called and close encounter         Maryjane Mayes    
Additional Notes: Patient consent was obtained to proceed with the visit and recommended plan of care after discussion of all risks and benefits, including the risks of COVID-19 exposure.
Detail Level: Simple

## 2023-05-10 ENCOUNTER — PATIENT OUTREACH (OUTPATIENT)
Dept: FAMILY MEDICINE | Facility: CLINIC | Age: 43
End: 2023-05-10
Payer: COMMERCIAL

## 2023-05-10 NOTE — TELEPHONE ENCOUNTER
Patient Quality Outreach    Patient is due for the following:   Diabetes -  A1C and Foot Exam    Next Steps:   Schedule a office visit for Diabetes     Type of outreach:    Sent letter.      Questions for provider review:    None           Lenore Llanos MA

## 2023-05-10 NOTE — LETTER
[address] [address]  [phone]  Dept: [phone]    May 10, 2023    David Roberts  [facility] [address] [facility] [address]    Dear aDvid,    At [facility] we care about your health and are committed to providing quality patient care.     Here is a list of Health Maintenance topics that are due now or due soon:  Health Maintenance Due   Topic Date Due    ADVANCE CARE PLANNING  Never done    Pneumococcal Vaccine: Pediatrics (0 to 5 Years) and At-Risk Patients (6 to 64 Years) (2 - PCV) 02/14/2013    DTAP/TDAP/TD IMMUNIZATION (5 - Td or Tdap) 03/18/2019    YEARLY PREVENTIVE VISIT  04/26/2019    COVID-19 Vaccine (4 - Booster for Pfizer series) 03/16/2022    DIABETIC FOOT EXAM  03/27/2022    INFLUENZA VACCINE (1) 09/01/2022    PHQ-2 (once per calendar year)  01/01/2023        We are recommending that you:     Schedule a Diabetes Follow-Up Office Appointment: You are due to be seen with your primary care provider for a diabetes follow up office appointment. Please schedule this as soon as you are able.    To schedule an appointment or discuss this further, you may contact us by phone at the [facility] at [phone] or online through the patient [email or web address] @ [email or web address]    Thank you for trusting [facility] and we appreciate the opportunity to serve you.  We look forward to supporting your healthcare needs in the future.    Your partners in health,      Quality Committee at [facility]

## 2023-08-10 ENCOUNTER — TELEPHONE (OUTPATIENT)
Dept: FAMILY MEDICINE | Facility: CLINIC | Age: 43
End: 2023-08-10
Payer: COMMERCIAL

## 2023-08-10 NOTE — TELEPHONE ENCOUNTER
Patient Quality Outreach    Patient is due for the following:   Diabetes -  A1C, Eye Exam, Microalbumin, and Foot Exam      Topic Date Due    Pneumococcal Vaccine (2 - PCV) 02/14/2013    Diptheria Tetanus Pertussis (DTAP/TDAP/TD) Vaccine (5 - Td or Tdap) 03/18/2019    COVID-19 Vaccine (4 - Pfizer series) 03/16/2022       Next Steps:   Patient has upcoming appointment, these items will be addressed at that time.    Type of outreach:    Chart review performed, no outreach needed.      Questions for provider review:    None           Vero Weller MA

## 2023-08-21 ENCOUNTER — OFFICE VISIT (OUTPATIENT)
Dept: URGENT CARE | Facility: URGENT CARE | Age: 43
End: 2023-08-21
Payer: COMMERCIAL

## 2023-08-21 VITALS
SYSTOLIC BLOOD PRESSURE: 128 MMHG | OXYGEN SATURATION: 99 % | BODY MASS INDEX: 22.94 KG/M2 | TEMPERATURE: 97.7 F | WEIGHT: 166.8 LBS | DIASTOLIC BLOOD PRESSURE: 83 MMHG | HEART RATE: 67 BPM

## 2023-08-21 DIAGNOSIS — E11.9 TYPE 2 DIABETES MELLITUS WITHOUT COMPLICATION, UNSPECIFIED WHETHER LONG TERM INSULIN USE (H): Primary | ICD-10-CM

## 2023-08-21 LAB
ALBUMIN SERPL-MCNC: 3.9 G/DL (ref 3.4–5)
ALBUMIN UR-MCNC: NEGATIVE MG/DL
ALP SERPL-CCNC: 86 U/L (ref 40–150)
ALT SERPL W P-5'-P-CCNC: 36 U/L (ref 0–70)
ANION GAP SERPL CALCULATED.3IONS-SCNC: 4 MMOL/L (ref 3–14)
APPEARANCE UR: CLEAR
AST SERPL W P-5'-P-CCNC: 30 U/L (ref 0–45)
BILIRUB SERPL-MCNC: 0.9 MG/DL (ref 0.2–1.3)
BILIRUB UR QL STRIP: NEGATIVE
BUN SERPL-MCNC: 11 MG/DL (ref 7–30)
CALCIUM SERPL-MCNC: 9.2 MG/DL (ref 8.5–10.1)
CHLORIDE BLD-SCNC: 99 MMOL/L (ref 94–109)
CO2 SERPL-SCNC: 33 MMOL/L (ref 20–32)
COLOR UR AUTO: YELLOW
CREAT SERPL-MCNC: 1.2 MG/DL (ref 0.66–1.25)
ERYTHROCYTE [DISTWIDTH] IN BLOOD BY AUTOMATED COUNT: 12.2 % (ref 10–15)
GFR SERPL CREATININE-BSD FRML MDRD: 77 ML/MIN/1.73M2
GLUCOSE BLD-MCNC: 294 MG/DL (ref 70–99)
GLUCOSE UR STRIP-MCNC: >=1000 MG/DL
HBA1C MFR BLD: >15 % (ref 0–5.6)
HCT VFR BLD AUTO: 42.3 % (ref 40–53)
HGB BLD-MCNC: 14.7 G/DL (ref 13.3–17.7)
HGB UR QL STRIP: NEGATIVE
KETONES UR STRIP-MCNC: ABNORMAL MG/DL
LEUKOCYTE ESTERASE UR QL STRIP: NEGATIVE
MCH RBC QN AUTO: 26.3 PG (ref 26.5–33)
MCHC RBC AUTO-ENTMCNC: 34.8 G/DL (ref 31.5–36.5)
MCV RBC AUTO: 76 FL (ref 78–100)
NITRATE UR QL: NEGATIVE
PH UR STRIP: 5.5 [PH] (ref 5–7)
PLATELET # BLD AUTO: 199 10E3/UL (ref 150–450)
POTASSIUM BLD-SCNC: 4 MMOL/L (ref 3.4–5.3)
PROT SERPL-MCNC: 6.6 G/DL (ref 6.8–8.8)
RBC # BLD AUTO: 5.59 10E6/UL (ref 4.4–5.9)
SODIUM SERPL-SCNC: 136 MMOL/L (ref 133–144)
SP GR UR STRIP: 1.01 (ref 1–1.03)
UROBILINOGEN UR STRIP-ACNC: 0.2 E.U./DL
WBC # BLD AUTO: 3.7 10E3/UL (ref 4–11)

## 2023-08-21 PROCEDURE — 80053 COMPREHEN METABOLIC PANEL: CPT

## 2023-08-21 PROCEDURE — 83036 HEMOGLOBIN GLYCOSYLATED A1C: CPT

## 2023-08-21 PROCEDURE — 36415 COLL VENOUS BLD VENIPUNCTURE: CPT

## 2023-08-21 PROCEDURE — 85027 COMPLETE CBC AUTOMATED: CPT

## 2023-08-21 PROCEDURE — 81003 URINALYSIS AUTO W/O SCOPE: CPT

## 2023-08-21 PROCEDURE — 99214 OFFICE O/P EST MOD 30 MIN: CPT

## 2023-08-21 RX ORDER — METFORMIN HCL 500 MG
1000 TABLET, EXTENDED RELEASE 24 HR ORAL
Qty: 60 TABLET | Refills: 0 | Status: SHIPPED | OUTPATIENT
Start: 2023-08-21 | End: 2023-09-18

## 2023-08-21 NOTE — PROGRESS NOTES
Assessment & Plan   (E11.9) Type 2 diabetes mellitus without complication, unspecified whether long term insulin use (H)  (primary encounter diagnosis)  Plan: Comprehensive metabolic panel, Hemoglobin A1c,         CBC with platelets, UA Macroscopic with reflex         to Microscopic and Culture, metFORMIN         (GLUCOPHAGE XR) 500 MG 24 hr tablet, Adult         Endocrinology  Referral, insulin         detemir (LEVEMIR PEN) 100 UNIT/ML pen    Within the clinic visit the patient and I discussed that his lab tests are pending and I will contact him with the results and neck steps.  We discussed starting to take metformin today and that any additional medication he might need we would evaluate based on his lab values.  Instructed him to begin checking his blood sugar levels 3 times a day starting today.  He indicated he has enough test strips and supplies to check his blood sugars.  We discussed aiming for 130 mg/dL or less before meals and 180 mg/dL or less after meals.  Instructed the patient to follow-up with his primary care provider next month as scheduled and go to the emergency department with any new or worsening symptoms.  Patient acknowledged his understanding of the above plan.    Contacted the patient via phone after the test results were back.  Informed the patient of the test results and the need to start insulin today.  We also discussed the need to follow-up with endocrinology.  Reiterated the need to go to the emergency department with any new or worsening symptoms.  Patient acknowledged his understanding of this plan over the phone.    The use of Dragon/Proxio dictation services may have been used to construct the content in this note; any grammatical or spelling errors are non-intentional. Please contact the author of this note directly if you are in need of any clarification.      ÁLVARO Worrell [facility]    Tyson Hernandez is a  43 year old male who presents to clinic today for the following health issues:  Chief Complaint   Patient presents with    Diabetes     Frequent urination, no burning, no feeling of bladder is still full unable to empty, no pain when urinating, noticed it weeks ago      HPI  The patient reports urinary frequency, being thirsty all the time, intermittent lightheadedness, decreased appetite and unintentional weight loss over the past 4 weeks.  The patient reports being out of his medications for his diabetes since March due to a lack of insurance.  He also reports he does not check his blood sugar at home.  Patient denies any diarrhea, nausea, vomiting, blurred vision or chest discomfort.      ROS:  Negative except noted above.    Review of Systems        Objective    /83 (BP Location: Left arm, Patient Position: Sitting, Cuff Size: Adult Regular)   Pulse 67   Temp 97.7  F (36.5  C) (Tympanic)   Wt 75.7 kg (166 lb 12.8 oz)   SpO2 99%   BMI 22.94 kg/m    Physical Exam   GENERAL: healthy, alert and no distress  EYES: Eyes grossly normal to inspection, PERRL and conjunctivae and sclerae normal  HENT: nose and mouth without ulcers or lesions, oropharynx clear, and oral mucous membranes moist  RESP: lungs clear to auscultation - no rales, rhonchi or wheezes  CV: regular rate and rhythm, normal S1 S2, no S3 or S4, no murmur, click or rub, no peripheral edema and peripheral pulses strong  ABDOMEN: soft, nontender, no hepatosplenomegaly, no masses and bowel sounds normal  MS: no gross musculoskeletal defects noted, no edema  SKIN: no suspicious lesions or rashes  NEURO: Normal strength and tone, mentation intact and speech normal  PSYCH: mentation appears normal, affect normal/bright

## 2023-08-21 NOTE — PATIENT INSTRUCTIONS
Your lab tests are pending.  I will contact you to discuss the results and next steps.  Begin taking the metformin today.  We will discuss any additional medication you might need based on the lab values.  Also, check your blood sugar levels three times a day starting today.  Aim for 130 mg/dL or less before meals and 180 mg/dL or less after meals.  Follow up with your primary care provider next month as scheduled.  Go to the emergency department with any new or worsening symptoms.

## 2023-09-18 ENCOUNTER — OFFICE VISIT (OUTPATIENT)
Dept: FAMILY MEDICINE | Facility: CLINIC | Age: 43
End: 2023-09-18
Payer: COMMERCIAL

## 2023-09-18 VITALS
TEMPERATURE: 98.1 F | RESPIRATION RATE: 16 BRPM | OXYGEN SATURATION: 99 % | WEIGHT: 167.6 LBS | HEIGHT: 72 IN | BODY MASS INDEX: 22.7 KG/M2 | HEART RATE: 77 BPM | DIASTOLIC BLOOD PRESSURE: 76 MMHG | SYSTOLIC BLOOD PRESSURE: 122 MMHG

## 2023-09-18 DIAGNOSIS — E11.9 TYPE 2 DIABETES MELLITUS WITHOUT COMPLICATION, UNSPECIFIED WHETHER LONG TERM INSULIN USE (H): Primary | ICD-10-CM

## 2023-09-18 LAB
ANION GAP SERPL CALCULATED.3IONS-SCNC: 7 MMOL/L (ref 7–15)
BUN SERPL-MCNC: 12.1 MG/DL (ref 6–20)
CALCIUM SERPL-MCNC: 9.4 MG/DL (ref 8.6–10)
CHLORIDE SERPL-SCNC: 100 MMOL/L (ref 98–107)
CHOLEST SERPL-MCNC: 181 MG/DL
CREAT SERPL-MCNC: 1.07 MG/DL (ref 0.67–1.17)
CREAT UR-MCNC: 154 MG/DL
DEPRECATED HCO3 PLAS-SCNC: 32 MMOL/L (ref 22–29)
EGFRCR SERPLBLD CKD-EPI 2021: 88 ML/MIN/1.73M2
GLUCOSE SERPL-MCNC: 267 MG/DL (ref 70–99)
HBA1C MFR BLD: 14.8 % (ref 0–5.6)
HDLC SERPL-MCNC: 37 MG/DL
LDLC SERPL CALC-MCNC: 128 MG/DL
MICROALBUMIN UR-MCNC: 125 MG/L
MICROALBUMIN/CREAT UR: 81.17 MG/G CR (ref 0–17)
NONHDLC SERPL-MCNC: 144 MG/DL
POTASSIUM SERPL-SCNC: 4 MMOL/L (ref 3.4–5.3)
SODIUM SERPL-SCNC: 139 MMOL/L (ref 136–145)
TRIGL SERPL-MCNC: 79 MG/DL

## 2023-09-18 PROCEDURE — 90471 IMMUNIZATION ADMIN: CPT | Performed by: PHYSICIAN ASSISTANT

## 2023-09-18 PROCEDURE — 90686 IIV4 VACC NO PRSV 0.5 ML IM: CPT | Performed by: PHYSICIAN ASSISTANT

## 2023-09-18 PROCEDURE — 83036 HEMOGLOBIN GLYCOSYLATED A1C: CPT | Performed by: PHYSICIAN ASSISTANT

## 2023-09-18 PROCEDURE — 90677 PCV20 VACCINE IM: CPT | Performed by: PHYSICIAN ASSISTANT

## 2023-09-18 PROCEDURE — 36415 COLL VENOUS BLD VENIPUNCTURE: CPT | Performed by: PHYSICIAN ASSISTANT

## 2023-09-18 PROCEDURE — 80061 LIPID PANEL: CPT | Performed by: PHYSICIAN ASSISTANT

## 2023-09-18 PROCEDURE — 82043 UR ALBUMIN QUANTITATIVE: CPT | Performed by: PHYSICIAN ASSISTANT

## 2023-09-18 PROCEDURE — 99214 OFFICE O/P EST MOD 30 MIN: CPT | Mod: 25 | Performed by: PHYSICIAN ASSISTANT

## 2023-09-18 PROCEDURE — 90472 IMMUNIZATION ADMIN EACH ADD: CPT | Performed by: PHYSICIAN ASSISTANT

## 2023-09-18 PROCEDURE — 90715 TDAP VACCINE 7 YRS/> IM: CPT | Performed by: PHYSICIAN ASSISTANT

## 2023-09-18 PROCEDURE — 80048 BASIC METABOLIC PNL TOTAL CA: CPT | Performed by: PHYSICIAN ASSISTANT

## 2023-09-18 PROCEDURE — 82570 ASSAY OF URINE CREATININE: CPT | Performed by: PHYSICIAN ASSISTANT

## 2023-09-18 RX ORDER — GLIPIZIDE 10 MG/1
10 TABLET, FILM COATED, EXTENDED RELEASE ORAL DAILY
Qty: 90 TABLET | Refills: 1 | Status: SHIPPED | OUTPATIENT
Start: 2023-09-18 | End: 2024-03-19

## 2023-09-18 RX ORDER — METFORMIN HCL 500 MG
2000 TABLET, EXTENDED RELEASE 24 HR ORAL
Qty: 360 TABLET | Refills: 1 | Status: SHIPPED | OUTPATIENT
Start: 2023-09-18 | End: 2024-03-19

## 2023-09-18 RX ORDER — LISINOPRIL 2.5 MG/1
2.5 TABLET ORAL DAILY
Qty: 90 TABLET | Refills: 3 | Status: SHIPPED | OUTPATIENT
Start: 2023-09-18 | End: 2024-09-20

## 2023-09-18 RX ORDER — ATORVASTATIN CALCIUM 10 MG/1
10 TABLET, FILM COATED ORAL DAILY
Qty: 90 TABLET | Refills: 3 | Status: SHIPPED | OUTPATIENT
Start: 2023-09-18 | End: 2024-09-24

## 2023-09-18 NOTE — NURSING NOTE
Prior to immunization administration, verified patients identity using patient s name and date of birth. Please see Immunization Activity for additional information.     Screening Questionnaire for Adult Immunization    Are you sick today?   No   Do you have allergies to medications, food, a vaccine component or latex?   Yes   Have you ever had a serious reaction after receiving a vaccination?   No   Do you have a long-term health problem with heart, lung, kidney, or metabolic disease (e.g., diabetes), asthma, a blood disorder, no spleen, complement component deficiency, a cochlear implant, or a spinal fluid leak?  Are you on long-term aspirin therapy?   Yes   Do you have cancer, leukemia, HIV/AIDS, or any other immune system problem?   No   Do you have a parent, brother, or sister with an immune system problem?   No   In the past 3 months, have you taken medications that affect  your immune system, such as prednisone, other steroids, or anticancer drugs; drugs for the treatment of rheumatoid arthritis, Crohn s disease, or psoriasis; or have you had radiation treatments?   No   Have you had a seizure, or a brain or other nervous system problem?   No   During the past year, have you received a transfusion of blood or blood    products, or been given immune (gamma) globulin or antiviral drug?   No   For women: Are you pregnant or is there a chance you could become       pregnant during the next month?   No   Have you received any vaccinations in the past 4 weeks?   No     Immunization questionnaire was positive for at least one answer.  Notified bill.      Patient instructed to remain in clinic for 15 minutes afterwards, and to report any adverse reactions.     Screening performed by Evy Crouch MA on 9/18/2023 at 9:03 AM.

## 2023-09-18 NOTE — PROGRESS NOTES
Assessment & Plan   Problem List Items Addressed This Visit          Endocrine    Type 2 diabetes mellitus without complication (H) - Primary    Relevant Medications    metFORMIN (GLUCOPHAGE XR) 500 MG 24 hr tablet    glipiZIDE (GLUCOTROL XL) 10 MG 24 hr tablet    atorvastatin (LIPITOR) 10 MG tablet    lisinopril (ZESTRIL) 2.5 MG tablet    Other Relevant Orders    Adult Eye  Referral    Lipid panel reflex to direct LDL Non-fasting (Completed)    Albumin Random Urine Quantitative with Creat Ratio (Completed)    Hemoglobin A1c (Completed)    Basic metabolic panel  (Ca, Cl, CO2, Creat, Gluc, K, Na, BUN) (Completed)    AMB Adult Diabetes Educator Referral    Adult Endocrinology  Referral      Uncontrolled DM.  Pt is not compliant with medication treatment in terms of using insulin and prefers oral treatment.   Increase metformin to 4 tablet 2,000 mg once daily  Start Glipizide 10 mg daily   Follow up in 1 month . Discussed that if A1c is not lowering well in 1 month, he needs to start on insulin, other injectable medications.           25 minutes spent by me on the date of the encounter doing chart review, history and exam, documentation and further activities per the note           PATRICK Becerril [facility]    Tyson Hernandez is a 43 year old, presenting for the following health issues:  Diabetes        9/18/2023     8:08 AM   Additional Questions   Roomed by Ophelia       History of Present Illness       Diabetes:   He presents for follow up of diabetes.  He is checking home blood glucose a few times a week.   He checks blood glucose before meals.  Blood glucose is sometimes over 200 and never under 70. He is aware of hypoglycemia symptoms including none.    He has no concerns regarding his diabetes at this time.   He is not experiencing numbness or burning in feet, excessive thirst, blurry vision, weight changes or redness, sores or blisters on feet.  "The patient has not had a diabetic eye exam in the last 12 months.          He eats 0-1 servings of fruits and vegetables daily.He consumes 1 sweetened beverage(s) daily.He exercises with enough effort to increase his heart rate 10 to 19 minutes per day.  He exercises with enough effort to increase his heart rate 3 or less days per week.   He is taking medications regularly.       Diabetes Follow-up    How often are you checking your blood sugar? Not at all  What concerns do you have today about your diabetes? None   Do you have any of these symptoms? (Select all that apply)  No numbness or tingling in feet.  No redness, sores or blisters on feet.  No complaints of excessive thirst.  No reports of blurry vision.  No significant changes to weight.  Have you had a diabetic eye exam in the last 12 months? No    Pt is not taking insulin. Takes only metformin 1 tab daily .     BP Readings from Last 2 Encounters:   09/21/23 119/69   09/18/23 122/76     Hemoglobin A1C (%)   Date Value   09/18/2023 14.8 (H)   08/21/2023 >15.0 (H)   03/26/2021 6.7 (H)   12/08/2020 10.9 (H)     LDL Cholesterol Calculated (mg/dL)   Date Value   09/18/2023 128 (H)   07/07/2022 87   12/08/2020 125 (H)   09/05/2019 101 (H)     LDL Cholesterol Direct (mg/dL)   Date Value   03/26/2021 68               Review of Systems   Constitutional, HEENT, cardiovascular, pulmonary, gi and gu systems are negative, except as otherwise noted.      Objective    /76 (BP Location: Left arm, Patient Position: Sitting, Cuff Size: Adult Regular)   Pulse 77   Temp 98.1  F (36.7  C) (Oral)   Resp 16   Ht 1.816 m (5' 11.5\")   Wt 76 kg (167 lb 9.6 oz)   SpO2 99%   BMI 23.05 kg/m    Body mass index is 23.05 kg/m .  Physical Exam   GENERAL: healthy, alert and no distress  NECK: no adenopathy, no asymmetry, masses, or scars and thyroid normal to palpation  RESP: lungs clear to auscultation - no rales, rhonchi or wheezes  CV: regular rate and rhythm, normal S1 S2, " no S3 or S4, no murmur, click or rub, no peripheral edema and peripheral pulses strong  ABDOMEN: soft, nontender, no hepatosplenomegaly, no masses and bowel sounds normal  MS: no gross musculoskeletal defects noted, no edema    Results for orders placed or performed in visit on 09/18/23   Lipid panel reflex to direct LDL Non-fasting     Status: Abnormal   Result Value Ref Range    Cholesterol 181 <200 mg/dL    Triglycerides 79 <150 mg/dL    Direct Measure HDL 37 (L) >=40 mg/dL    LDL Cholesterol Calculated 128 (H) <=100 mg/dL    Non HDL Cholesterol 144 (H) <130 mg/dL    Narrative    Cholesterol  Desirable:  <200 mg/dL    Triglycerides  Normal:  Less than 150 mg/dL  Borderline High:  150-199 mg/dL  High:  200-499 mg/dL  Very High:  Greater than or equal to 500 mg/dL    Direct Measure HDL  Female:  Greater than or equal to 50 mg/dL   Male:  Greater than or equal to 40 mg/dL    LDL Cholesterol  Desirable:  <100mg/dL  Above Desirable:  100-129 mg/dL   Borderline High:  130-159 mg/dL   High:  160-189 mg/dL   Very High:  >= 190 mg/dL    Non HDL Cholesterol  Desirable:  130 mg/dL  Above Desirable:  130-159 mg/dL  Borderline High:  160-189 mg/dL  High:  190-219 mg/dL  Very High:  Greater than or equal to 220 mg/dL   Albumin Random Urine Quantitative with Creat Ratio     Status: Abnormal   Result Value Ref Range    Creatinine Urine mg/dL 154.0 mg/dL    Albumin Urine mg/L 125.0 mg/L    Albumin Urine mg/g Cr 81.17 (H) 0.00 - 17.00 mg/g Cr   Hemoglobin A1c     Status: Abnormal   Result Value Ref Range    Hemoglobin A1C 14.8 (H) 0.0 - 5.6 %   Basic metabolic panel  (Ca, Cl, CO2, Creat, Gluc, K, Na, BUN)     Status: Abnormal   Result Value Ref Range    Sodium 139 136 - 145 mmol/L    Potassium 4.0 3.4 - 5.3 mmol/L    Chloride 100 98 - 107 mmol/L    Carbon Dioxide (CO2) 32 (H) 22 - 29 mmol/L    Anion Gap 7 7 - 15 mmol/L    Urea Nitrogen 12.1 6.0 - 20.0 mg/dL    Creatinine 1.07 0.67 - 1.17 mg/dL    Calcium 9.4 8.6 - 10.0 mg/dL     Glucose 267 (H) 70 - 99 mg/dL    GFR Estimate 88 >60 mL/min/1.73m2

## 2023-09-21 ENCOUNTER — OFFICE VISIT (OUTPATIENT)
Dept: FAMILY MEDICINE | Facility: CLINIC | Age: 43
End: 2023-09-21
Payer: COMMERCIAL

## 2023-09-21 VITALS
RESPIRATION RATE: 14 BRPM | HEART RATE: 78 BPM | BODY MASS INDEX: 23.42 KG/M2 | OXYGEN SATURATION: 98 % | HEIGHT: 71 IN | WEIGHT: 167.3 LBS | SYSTOLIC BLOOD PRESSURE: 119 MMHG | DIASTOLIC BLOOD PRESSURE: 69 MMHG | TEMPERATURE: 97.3 F

## 2023-09-21 DIAGNOSIS — Z71.84 TRAVEL ADVICE ENCOUNTER: Primary | ICD-10-CM

## 2023-09-21 PROCEDURE — 90691 TYPHOID VACCINE IM: CPT | Mod: GA | Performed by: NURSE PRACTITIONER

## 2023-09-21 PROCEDURE — 90471 IMMUNIZATION ADMIN: CPT | Mod: GA | Performed by: NURSE PRACTITIONER

## 2023-09-21 PROCEDURE — 90717 YELLOW FEVER VACCINE SUBQ: CPT | Mod: GA | Performed by: NURSE PRACTITIONER

## 2023-09-21 PROCEDURE — 90472 IMMUNIZATION ADMIN EACH ADD: CPT | Mod: GA | Performed by: NURSE PRACTITIONER

## 2023-09-21 PROCEDURE — 99402 PREV MED CNSL INDIV APPRX 30: CPT | Mod: 25 | Performed by: NURSE PRACTITIONER

## 2023-09-21 RX ORDER — ATOVAQUONE AND PROGUANIL HYDROCHLORIDE 250; 100 MG/1; MG/1
1 TABLET, FILM COATED ORAL DAILY
Qty: 30 TABLET | Refills: 0 | Status: SHIPPED | OUTPATIENT
Start: 2023-09-21

## 2023-09-21 RX ORDER — AZITHROMYCIN 500 MG/1
500 TABLET, FILM COATED ORAL DAILY
Qty: 3 TABLET | Refills: 0 | Status: SHIPPED | OUTPATIENT
Start: 2023-09-21 | End: 2023-09-24

## 2023-09-21 ASSESSMENT — PAIN SCALES - GENERAL: PAINLEVEL: NO PAIN (0)

## 2023-09-21 NOTE — PATIENT INSTRUCTIONS
Thank you for visiting the [facility] : [phone]  Today September 21, 2023 you received the    Yellow Fever (YF)    Typhoid - injectable. This vaccine is valid for two years.     Follow up vaccine appointments can be made as a NURSE ONLY visit at the Travel Clinic, (BE PREPARED TO WAIT, ) or at designated [facility].    If you are receiving the Rabies vaccines series, it is important that you follow the exact schedule ordered.     Pre-travel     We recommend that you purchase Medical Evacuation Insurance prior to your departure.  Https://wwwnc.cdc.gov/travel/page/insurance    [place] your travel plans with the  Department of [employer] through STEP ( Smart Traveler Enrollment Program ) https://step.state.gov.  STEP is a free service to allow [place] citizens and nationals traveling and living abroad to enroll their trip with the nearest U.S. Embassy or Consulate.    Animal Exposure: Avoid all mammals even if they look healthy.  If there is a bite, scratch or even a lick, wash area immediately with soap and water for 15 minutes and seek medical care within 24 hours for evaluation of Rabies post exposure treatment.  Contact your Medical Evacuation Insurance.    Repiratory illness prevention strategies ( Covid and Influenza ) CDC recommendations:  Consider wearing a mask in crowded or poorly ventilated indoor areas, including on public transportation and in transportation hubs.  Hand washing: frequent, thorough handwashing with soap and water for 20 seconds. Use an alcohol-based hand  with at least 60% alcohol if soap and water are not readily available. Avoid touching face, nose, eyes, mouth unless you have done appropriate hand washing as above.  VACCINES: Staying up to date on COVID-19 vaccines significantly lowers the risk of getting very sick, being hospitalized, or dying from COVID-19. CDC recommends that everyone stay up to date on their COVID-19  vaccines, especially people with weakened immune systems.    Travel Covid 19 Testing:  updated 12/06/2021  International travelers: Pre-travel:  See country specific Embassy websites or airline websites.    Post travel: CDC recommends getting tested 3-5 days after your trip     Post-travel illness:  Contact your provider or [facility] if you develop a fever, rash, cough, diarrhea or other symptoms for up to 1 year after travel.  Inform your healthcare provider when and where you traveled to.    Please call the [place] with any questions [phone]  Or send your provider a 'My Chart' note.

## 2023-09-21 NOTE — PROGRESS NOTES
"Nurse Note ( Pre-Travel Consult)    Itinerary:  [place]    Departure Date: 11/14    Return Date: 11/29    Length of Trip 2 weeks    Reason for Travel: Visiting friends and relatives         Urban or rural: urban    Accommodations: Family home        IMMUNIZATION HISTORY  Have you received any immunizations within the past 4 weeks?  Yes  Have you ever fainted from having your blood drawn or from an injection?  No  Have you ever had a fever reaction to vaccination?  No  Have you ever had any bad reaction or side effect from any vaccination?  No  Have you ever had hepatitis A or B vaccine?  Yes  Do you live (or work closely) with anyone who has AIDS, an AIDS-like condition, any other immune disorder or who is on chemotherapy for cancer?  No  Do you have a family history of immunodeficiency?  No  Have you received any injection of immune globulin or any blood products during the past 12 months?  No    Patient roomed by Fabián GARCIA Armando is a 43 year old male seen today alone for counsultation for international travel.   Patient will be departing in  7 week(s) and  traveling alone.      Patient itinerary :  will be in the urban region [facility] which risk for Malaria and Yellow Fever. exposure.      Patient's activities will include visiting friends and relatives.    Patient's country [place] birth is [place]     Special medical concerns: Diabetes Not taking insulin  Pre-travel questionnaire was completed by patient and reviewed by provider.     Vitals: /69   Pulse 78   Temp 97.3  F (36.3  C) (Temporal)   Resp 14   Ht 1.81 m (5' 11.25\")   Wt 75.9 kg (167 lb 4.8 oz)   SpO2 98%   BMI 23.17 kg/m    BMI= Body mass index is 23.17 kg/m .    EXAM:  General:  Well-nourished, well-developed in no acute distress.  Appears to be stated age, interacts appropriately and expresses understanding of information given to patient.    Current Outpatient Medications   Medication Sig Dispense " Refill    atorvastatin (LIPITOR) 10 MG tablet Take 1 tablet (10 mg) by mouth daily 90 tablet 3    glipiZIDE (GLUCOTROL XL) 10 MG 24 hr tablet Take 1 tablet (10 mg) by mouth daily 90 tablet 1    lisinopril (ZESTRIL) 2.5 MG tablet Take 1 tablet (2.5 mg) by mouth daily 90 tablet 3    metFORMIN (GLUCOPHAGE XR) 500 MG 24 hr tablet Take 4 tablets (2,000 mg) by mouth daily (with dinner) 360 tablet 1    blood glucose (NO BRAND SPECIFIED) lancets standard Use to test blood sugar 4 times daily or as directed. (Patient not taking: Reported on 2/13/2023) 150 each 11    blood glucose (NO BRAND SPECIFIED) test strip Use to test blood sugars 4 times daily or as directed (Patient not taking: Reported on 2/13/2023) 150 strip 11    blood glucose monitoring (NO BRAND SPECIFIED) meter device kit Use to test blood sugar 4 times daily or as directed. (Patient not taking: Reported on 2/13/2023) 1 kit 0    glipiZIDE (GLUCOTROL XL) 5 MG 24 hr tablet Take 1 tablet (5 mg) by mouth daily ++NO FURTHER REFILL WITHOUT LABS++ (Patient not taking: Reported on 2/13/2023) 30 tablet 0    insulin detemir (LEVEMIR FLEXPEN/FLEXTOUCH) 100 UNIT/ML pen Inject 20 Units Subcutaneous every morning Take at same time each day (Patient not taking: Reported on 2/13/2023) 30 mL 0    insulin detemir (LEVEMIR PEN) 100 UNIT/ML pen Inject 15 Units Subcutaneous At Bedtime for 30 days (Patient not taking: Reported on 9/18/2023) 4.5 mL 0    insulin pen needle (B-D U/F) 31G X 5 MM miscellaneous Use 1 pen needles daily or as directed. (Patient not taking: Reported on 2/13/2023) 30 each 1    Multiple Vitamin (DAILY VITAMIN PO)  (Patient not taking: Reported on 8/21/2023)      potassium chloride ER (K-TAB/KLOR-CON) 10 MEQ CR tablet Take 1 tablet (10 mEq) by mouth daily (Patient not taking: Reported on 8/21/2023) 90 tablet 0     Patient Active Problem List   Diagnosis    Back pain, chronic    CARDIOVASCULAR SCREENING; LDL GOAL LESS THAN 100    S/P vasectomy    Type 2 diabetes  mellitus without complication (H)    Dyshidrotic eczema    Type 2 diabetes mellitus with hyperglycemia, with long-term current use of insulin (H)    Mantoux: positive     Allergies   Allergen Reactions    Chloroquine     Other [Seasonal Allergies]      NEVAQUINE         Immunizations discussed include:   Covid 19: Up to date and vaccine is not available  Hepatitis A:  Up to date  Hepatitis B: Up to date  Influenza: Up to date  Typhoid: Ordered/given today, risks, benefits and side effects reviewed  Rabies: Declined  reviewed managment of a animal bite or scratch (washing wound, seek medical care within 24 hours for post exposure prophylaxis )  Yellow Fever: Yellow Fever ordered/given today - side effects, precautions, allergies, risks discussed. Patient expressed understanding.  Croatian Encephalitis: Not indicated  Meningococcus: Not indicated  Tetanus/Diphtheria: Up to date  Measles/Mumps/Rubella: Up to date  Cholera: Not available  Polio: Not indicated  Pneumococcal: Up to date  Varicella: Up to date  Shingrix: Not indicated  HPV:  Not indicated     TB: low risk , short stay    Altitude Exposure on this trip: no  Past tolerance to Altitude: na    ASSESSMENT/PLAN:  David was seen today for travel clinic.    Diagnoses and all orders for this visit:    Travel advice encounter  -     atovaquone-proguanil (MALARONE) 250-100 MG tablet; Take 1 tablet by mouth daily Start 2 days before exposure to Malaria and continue daily till  7 days after exposure.  -     azithromycin (ZITHROMAX) 500 MG tablet; Take 1 tablet (500 mg) by mouth daily for 3 doses Take 1 tablet a day for up to 3 days for severe diarrhea    Other orders  -     TYPHOID VACCINE, IM  -     YELLOW FEVER, LIVE SQ      I have reviewed general recommendations for safe travel   including: food/water precautions, insect precautions, roadway safety. Educational materials and [employer] report provided.    Malaraia prophylaxis recommended: Malarone        Evacuation  insurance advised and resources were provided to patient.    Total visit time 30 minutes  with over 50% of time spent counseling patient and shared decision making as detailed above.    Jo Castillo CNP  Certificate in Travel Health

## 2023-09-21 NOTE — NURSING NOTE
Per orders of Jo Castillo, injection of YF, Typhoid given by Carmelita Dotson RN. Prior to immunization administration, verified patients identity using patient s name and date of birth. Patient instructed to remain in clinic for 15 minutes afterwards, and to report any adverse reaction to me or clinic staff immediately.    Carmelita Dotson RN on 9/21/2023 at 9:56 AM.    Please see Immunization Activity for additional information.

## 2023-10-26 ENCOUNTER — IMMUNIZATION (OUTPATIENT)
Dept: NURSING | Facility: CLINIC | Age: 43
End: 2023-10-26
Payer: COMMERCIAL

## 2023-10-26 PROCEDURE — 91320 SARSCV2 VAC 30MCG TRS-SUC IM: CPT

## 2023-10-26 PROCEDURE — 90480 ADMN SARSCOV2 VAC 1/ONLY CMP: CPT

## 2024-01-01 NOTE — TELEPHONE ENCOUNTER
Diabetes Education Scheduling Outreach #1:    Call to patient to schedule. Left message with phone number to call to schedule.    Plan for 2nd outreach attempt within 1 week.    Jo Philippe  [facility]  Diabetes and Nutrition Scheduling       Statement Selected

## 2024-03-15 DIAGNOSIS — E11.9 TYPE 2 DIABETES MELLITUS WITHOUT COMPLICATION, UNSPECIFIED WHETHER LONG TERM INSULIN USE (H): ICD-10-CM

## 2024-03-19 RX ORDER — GLIPIZIDE 10 MG/1
10 TABLET, FILM COATED, EXTENDED RELEASE ORAL DAILY
Qty: 30 TABLET | Refills: 0 | Status: SHIPPED | OUTPATIENT
Start: 2024-03-19 | End: 2024-04-09

## 2024-03-19 RX ORDER — METFORMIN HCL 500 MG
2000 TABLET, EXTENDED RELEASE 24 HR ORAL
Qty: 120 TABLET | Refills: 0 | Status: SHIPPED | OUTPATIENT
Start: 2024-03-19 | End: 2024-04-09

## 2024-04-09 ENCOUNTER — OFFICE VISIT (OUTPATIENT)
Dept: FAMILY MEDICINE | Facility: CLINIC | Age: 44
End: 2024-04-09
Payer: COMMERCIAL

## 2024-04-09 ENCOUNTER — TELEPHONE (OUTPATIENT)
Dept: FAMILY MEDICINE | Facility: CLINIC | Age: 44
End: 2024-04-09

## 2024-04-09 VITALS
OXYGEN SATURATION: 100 % | DIASTOLIC BLOOD PRESSURE: 80 MMHG | HEIGHT: 71 IN | TEMPERATURE: 98 F | BODY MASS INDEX: 23.74 KG/M2 | SYSTOLIC BLOOD PRESSURE: 121 MMHG | HEART RATE: 92 BPM | RESPIRATION RATE: 16 BRPM | WEIGHT: 169.6 LBS

## 2024-04-09 DIAGNOSIS — R01.1 UNDIAGNOSED CARDIAC MURMURS: ICD-10-CM

## 2024-04-09 DIAGNOSIS — Z79.4 TYPE 2 DIABETES MELLITUS WITH HYPERGLYCEMIA, WITH LONG-TERM CURRENT USE OF INSULIN (H): Primary | ICD-10-CM

## 2024-04-09 DIAGNOSIS — E11.65 TYPE 2 DIABETES MELLITUS WITH HYPERGLYCEMIA, WITH LONG-TERM CURRENT USE OF INSULIN (H): Primary | ICD-10-CM

## 2024-04-09 DIAGNOSIS — E11.9 TYPE 2 DIABETES MELLITUS WITHOUT COMPLICATION, UNSPECIFIED WHETHER LONG TERM INSULIN USE (H): ICD-10-CM

## 2024-04-09 DIAGNOSIS — Z91.199 NON COMPLIANCE WITH MEDICAL TREATMENT: ICD-10-CM

## 2024-04-09 LAB
ANION GAP SERPL CALCULATED.3IONS-SCNC: 10 MMOL/L (ref 7–15)
BUN SERPL-MCNC: 7.4 MG/DL (ref 6–20)
CALCIUM SERPL-MCNC: 9.4 MG/DL (ref 8.6–10)
CHLORIDE SERPL-SCNC: 98 MMOL/L (ref 98–107)
CREAT SERPL-MCNC: 1.27 MG/DL (ref 0.67–1.17)
DEPRECATED HCO3 PLAS-SCNC: 30 MMOL/L (ref 22–29)
EGFRCR SERPLBLD CKD-EPI 2021: 72 ML/MIN/1.73M2
GLUCOSE SERPL-MCNC: 176 MG/DL (ref 70–99)
HBA1C MFR BLD: 10.8 % (ref 0–5.6)
POTASSIUM SERPL-SCNC: 4 MMOL/L (ref 3.4–5.3)
SODIUM SERPL-SCNC: 138 MMOL/L (ref 135–145)

## 2024-04-09 PROCEDURE — 99214 OFFICE O/P EST MOD 30 MIN: CPT | Performed by: PHYSICIAN ASSISTANT

## 2024-04-09 PROCEDURE — 83036 HEMOGLOBIN GLYCOSYLATED A1C: CPT | Performed by: PHYSICIAN ASSISTANT

## 2024-04-09 PROCEDURE — 80048 BASIC METABOLIC PNL TOTAL CA: CPT | Performed by: PHYSICIAN ASSISTANT

## 2024-04-09 PROCEDURE — 36415 COLL VENOUS BLD VENIPUNCTURE: CPT | Performed by: PHYSICIAN ASSISTANT

## 2024-04-09 RX ORDER — GLIPIZIDE 10 MG/1
20 TABLET, FILM COATED, EXTENDED RELEASE ORAL DAILY
Qty: 180 TABLET | Refills: 1 | Status: SHIPPED | OUTPATIENT
Start: 2024-04-09

## 2024-04-09 RX ORDER — METFORMIN HCL 500 MG
2000 TABLET, EXTENDED RELEASE 24 HR ORAL
Qty: 360 TABLET | Refills: 1 | Status: SHIPPED | OUTPATIENT
Start: 2024-04-09

## 2024-04-09 ASSESSMENT — PATIENT HEALTH QUESTIONNAIRE - PHQ9
10. IF YOU CHECKED OFF ANY PROBLEMS, HOW DIFFICULT HAVE THESE PROBLEMS MADE IT FOR YOU TO DO YOUR WORK, TAKE CARE OF THINGS AT HOME, OR GET ALONG WITH OTHER PEOPLE: NOT DIFFICULT AT ALL
SUM OF ALL RESPONSES TO PHQ QUESTIONS 1-9: 3
SUM OF ALL RESPONSES TO PHQ QUESTIONS 1-9: 3

## 2024-04-09 NOTE — TELEPHONE ENCOUNTER
This writer attempted to contact patient on 04/09/24      Reason for call relay message below and left message.      If patient calls back:   Please relay message from provider below. Prescriptions sent to [facility].         Ioana Braun RN      ----- Message from Rose Haddad PA-C sent at 4/9/2024  3:00 PM CDT -----      Please call the patient with these results:      A1C has improved, but still very high. Take Metformin 4 tablets once daily with dinner  Take Glipizide 2 tablets once daily with lunch  Follow up in 3 months     Rose Haddad PAC

## 2024-04-09 NOTE — PROGRESS NOTES
Assessment & Plan   Problem List Items Addressed This Visit          Endocrine    Type 2 diabetes mellitus without complication (H)    Relevant Medications    metFORMIN (GLUCOPHAGE XR) 500 MG 24 hr tablet    glipiZIDE (GLUCOTROL XL) 10 MG 24 hr tablet    Type 2 diabetes mellitus with hyperglycemia, with long-term current use of insulin (H) - Primary    Relevant Medications    metFORMIN (GLUCOPHAGE XR) 500 MG 24 hr tablet    glipiZIDE (GLUCOTROL XL) 10 MG 24 hr tablet    Other Relevant Orders    Adult Eye  Referral    HEMOGLOBIN A1C (Completed)    Basic metabolic panel  (Ca, Cl, CO2, Creat, Gluc, K, Na, BUN)       Behavioral    Non compliance with medical treatment     Other Visit Diagnoses       Undiagnosed cardiac murmurs        Relevant Orders    Echocardiogram Complete         A1C has improved, but still very high. Patient does not take medication as prescribed and skips doses. Discussed health risks associated with untreated DM.   Take Metformin 4 tablets once daily with dinner  Take Glipizide 2 tablets once daily with lunch  Follow up in 3 months     Schedule echo to investigate on heart murmur      30 minutes spent by me on the date of the encounter doing chart review, history and exam, documentation and further activities per the note           Tyson Hernandez is a 43 year old, presenting for the following health issues:  Diabetes        4/9/2024     8:20 AM   Additional Questions   Roomed by jaxon   Accompanied by self         4/9/2024     8:20 AM   Patient Reported Additional Medications   Patient reports taking the following new medications No     History of Present Illness       Diabetes:   He presents for follow up of diabetes.  He is checking home blood glucose a few times a week.   He checks blood glucose before meals.  Blood glucose is never over 200 and never under 70.     He has no concerns regarding his diabetes at this time.   He is not experiencing numbness or burning in feet,  "excessive thirst, blurry vision, weight changes or redness, sores or blisters on feet. The patient has not had a diabetic eye exam in the last 12 months.          He eats 0-1 servings of fruits and vegetables daily.He consumes 1 sweetened beverage(s) daily.He exercises with enough effort to increase his heart rate 30 to 60 minutes per day.  He exercises with enough effort to increase his heart rate 3 or less days per week.   He is taking medications regularly.         Diabetes Follow-up    Patient only takes 2 tablet of metformin daily   How often are you checking your blood sugar? A few times a month  What time of day are you checking your blood sugars (select all that apply)?  Before meals  Have you had any blood sugars above 200?  Yes   Have you had any blood sugars below 70?  No  What symptoms do you notice when your blood sugar is low?  None  What concerns do you have today about your diabetes? None   Do you have any of these symptoms? (Select all that apply)  No numbness or tingling in feet.  No redness, sores or blisters on feet.  No complaints of excessive thirst.  No reports of blurry vision.  No significant changes to weight.  Have you had a diabetic eye exam in the last 12 months? No        BP Readings from Last 2 Encounters:   04/09/24 121/80   09/21/23 119/69     Hemoglobin A1C (%)   Date Value   04/09/2024 10.8 (H)   09/18/2023 14.8 (H)   03/26/2021 6.7 (H)   12/08/2020 10.9 (H)     LDL Cholesterol Calculated (mg/dL)   Date Value   09/18/2023 128 (H)   07/07/2022 87   12/08/2020 125 (H)   09/05/2019 101 (H)     LDL Cholesterol Direct (mg/dL)   Date Value   03/26/2021 68           Review of Systems  Constitutional, HEENT, cardiovascular, pulmonary, gi and gu systems are negative, except as otherwise noted.      Objective    /80 (BP Location: Left arm, Patient Position: Sitting, Cuff Size: Adult Regular)   Pulse 92   Temp 98  F (36.7  C) (Oral)   Resp 16   Ht 1.803 m (5' 11\")   Wt 76.9 kg (169 " lb 9.6 oz)   SpO2 100%   BMI 23.65 kg/m    Body mass index is 23.65 kg/m .  Physical Exam   GENERAL: alert and no distress  EYES: Eyes grossly normal to inspection, PERRL and conjunctivae and sclerae normal  HENT: ear canals and TM's normal, nose and mouth without ulcers or lesions  NECK: no adenopathy, no asymmetry, masses, or scars  RESP: lungs clear to auscultation - no rales, rhonchi or wheezes  CV: regular rates and rhythm, normal S1 S2, no S3 or S4, mid systolic mechanical whistle is present, peripheral pulses strong, and no peripheral edema  ABDOMEN: soft, nontender, no hepatosplenomegaly, no masses and bowel sounds normal  MS: no gross musculoskeletal defects noted, no edema  SKIN: no suspicious lesions or rashes  NEURO: Normal strength and tone, mentation intact and speech normal  PSYCH: mentation appears normal, affect normal/bright    Results for orders placed or performed in visit on 04/09/24 (from the past 24 hour(s))   HEMOGLOBIN A1C   Result Value Ref Range    Hemoglobin A1C 10.8 (H) 0.0 - 5.6 %    Narrative    Results confirmed by repeat test.             Signed Electronically by: Rose Haddad PA-C

## 2024-04-09 NOTE — RESULT ENCOUNTER NOTE
Please call the patient with these results:      A1C has improved, but still very high. Take Metformin 4 tablets once daily with dinner  Take Glipizide 2 tablets once daily with lunch  Follow up in 3 months     Rose Haddad PAC

## 2024-04-10 NOTE — TELEPHONE ENCOUNTER
This writer attempted to contact patient on 04/10/24      Reason for call results and left message.      If patient calls back:   Registered Nurse called.       KHRIS OrdoñezN, RN  [facility]

## 2024-04-11 NOTE — TELEPHONE ENCOUNTER
This writer attempted to contact patient on 04/11/24      Reason for call results and left message.      If patient calls back:   Relay message below, (read verbatim), document that pt called and close encounter        Chanelle Ramirez RN

## 2024-04-12 ENCOUNTER — TELEPHONE (OUTPATIENT)
Dept: FAMILY MEDICINE | Facility: CLINIC | Age: 44
End: 2024-04-12
Payer: COMMERCIAL

## 2024-04-12 NOTE — TELEPHONE ENCOUNTER
3 attempts have been made to contact patient.     Routing to provider. If you would like letter sent, pend letter and route to  pool to send.     Chanelle Ramirez RN

## 2024-04-12 NOTE — TELEPHONE ENCOUNTER
[facility]    Phone Message    May a detailed message be left on voicemail: yes     Reason for Call: Requesting Results     Name/type of test: lab  Date of test: 04-  Was test done at a location other than [facility] (Please fill in the location if not [facility])?: No    Action Taken: Message routed to:  Other:  primary care clinic pool   please call [phone]    Travel Screening: Not Applicable

## 2024-04-15 NOTE — TELEPHONE ENCOUNTER
"RN left detailed message with provider results note below. RN advised pt to return clinic call with any questions.     Chanelle Ramirez, RN    \"Please call the patient with these results:        A1C has improved, but still very high. Take Metformin 4 tablets once daily with dinner  Take Glipizide 2 tablets once daily with lunch  Follow up in 3 months      Rose Haddad PAC\"  "

## 2024-04-21 NOTE — TELEPHONE ENCOUNTER
Prescription refilled. Please call and assist with scheduling follow up for diaetes.  No further refills without follow up with us   -3

## 2024-09-20 DIAGNOSIS — E11.9 TYPE 2 DIABETES MELLITUS WITHOUT COMPLICATION, UNSPECIFIED WHETHER LONG TERM INSULIN USE (H): ICD-10-CM

## 2024-09-20 RX ORDER — LISINOPRIL 2.5 MG/1
2.5 TABLET ORAL DAILY
Qty: 90 TABLET | Refills: 1 | Status: SHIPPED | OUTPATIENT
Start: 2024-09-20

## 2024-09-24 RX ORDER — ATORVASTATIN CALCIUM 10 MG/1
10 TABLET, FILM COATED ORAL DAILY
Qty: 90 TABLET | Refills: 1 | Status: SHIPPED | OUTPATIENT
Start: 2024-09-24

## 2024-10-18 ENCOUNTER — TELEPHONE (OUTPATIENT)
Dept: FAMILY MEDICINE | Facility: CLINIC | Age: 44
End: 2024-10-18
Payer: COMMERCIAL

## 2024-10-18 NOTE — LETTER
October 18, 2024    David Roberts  [address]  [place] [address]    Dear David Roberts,     At [facility] we care about your health and are committed to providing quality patient care.    Which includes staying current on preventive cancer screenings.  You can increase your chances of finding and treating cancers through regular screenings.      Our records indicate you may be due for the following preventive screening(s):  Diabetes -  A1C, Eye Exam, Microalbumin, and Foot Exam  Physical Preventive Adult Physical      Topic Date Due    Flu Vaccine (1) 09/01/2024    COVID-19 Vaccine (5 - 2024-25 season) 09/01/2024       To schedule an appointment or discuss this screening further, you may contact us by phone at the [facility] at [phone] or online through the patient [email or web address] @ [email or web address]    If you have had any of the screenings listed above at another facility, please call us so that we may update your chart.      Your partners in health,      Quality Committee at [facility]

## 2024-10-18 NOTE — TELEPHONE ENCOUNTER
Patient Quality Outreach    Patient is due for the following:   Diabetes -  A1C, Eye Exam, Microalbumin, and Foot Exam  Physical Preventive Adult Physical      Topic Date Due    Flu Vaccine (1) 09/01/2024    COVID-19 Vaccine (5 - 2024-25 season) 09/01/2024       Next Steps:   Schedule a Adult Preventative    Type of outreach:    Sent letter.      Questions for provider review:    None           Vero Weller MA

## 2024-11-18 DIAGNOSIS — Z71.84 TRAVEL ADVICE ENCOUNTER: ICD-10-CM

## 2024-11-19 RX ORDER — ATOVAQUONE AND PROGUANIL HYDROCHLORIDE 250; 100 MG/1; MG/1
TABLET, FILM COATED ORAL
Qty: 30 TABLET | Refills: 0 | OUTPATIENT
Start: 2024-11-19

## 2024-11-19 NOTE — TELEPHONE ENCOUNTER
Called pt and lvm to call back and schedule an appt to get medication refilled. Pt Is overdue for an OV.

## 2025-02-15 DIAGNOSIS — E11.9 TYPE 2 DIABETES MELLITUS WITHOUT COMPLICATION, UNSPECIFIED WHETHER LONG TERM INSULIN USE (H): ICD-10-CM

## 2025-02-17 NOTE — TELEPHONE ENCOUNTER
Clinic RN: Please investigate patient's chart or contact patient if the information cannot be found because patient should have run out of this medication on 10/9/2025. Confirm patient is taking this medication as prescribed. Document findings and route refill encounter to provider for approval or denial.

## 2025-02-17 NOTE — TELEPHONE ENCOUNTER
This writer attempted to contact patient on 02/17/25      Reason for call gap in fill and left message.      If patient calls back:   Clarify gap in filling medications.         Chanelle Ramirez RN

## 2025-02-18 NOTE — TELEPHONE ENCOUNTER
Patient calling back in, writer relayed message below. Patient states he has been taking meds as prescribed, has enough to last until about next week, so is looking for refill. Confirmed correct dosages.    Writer scheduled patient for DM follow up next month per patient request, patient aware of appt time.      Routing to provider to advise on maryam refill until appt next month      Elva Parkinson RN, BSN  [facility]  [place], [place] and [place]

## 2025-02-18 NOTE — TELEPHONE ENCOUNTER
This writer attempted to contact patient on 02/18/25      Reason for call refill request and left message.      If patient calls back:   Route to RN line, clarify gap in refill request, check to see if patient takings meds as prescribed. If so, please route to provider to advise. Of note, patient overdue for yearly physical, also overdue for diabetes follow up - likely needs this scheduled prior to fills, please assist in scheduling if needed      Elva Parkinson, RN, BSN  [facility]  [place], [place] and [place]

## 2025-02-19 RX ORDER — METFORMIN HYDROCHLORIDE 500 MG/1
2000 TABLET, EXTENDED RELEASE ORAL
Qty: 120 TABLET | Refills: 0 | Status: SHIPPED | OUTPATIENT
Start: 2025-02-19

## 2025-02-19 RX ORDER — GLIPIZIDE 10 MG/1
20 TABLET, FILM COATED, EXTENDED RELEASE ORAL DAILY
Qty: 60 TABLET | Refills: 0 | Status: SHIPPED | OUTPATIENT
Start: 2025-02-19

## 2025-03-18 ENCOUNTER — OFFICE VISIT (OUTPATIENT)
Dept: FAMILY MEDICINE | Facility: CLINIC | Age: 45
End: 2025-03-18
Payer: COMMERCIAL

## 2025-03-18 VITALS
RESPIRATION RATE: 16 BRPM | WEIGHT: 159.8 LBS | HEART RATE: 78 BPM | DIASTOLIC BLOOD PRESSURE: 71 MMHG | HEIGHT: 71 IN | BODY MASS INDEX: 22.37 KG/M2 | TEMPERATURE: 98.2 F | SYSTOLIC BLOOD PRESSURE: 125 MMHG | OXYGEN SATURATION: 97 %

## 2025-03-18 DIAGNOSIS — E11.65 TYPE 2 DIABETES MELLITUS WITH HYPERGLYCEMIA, WITH LONG-TERM CURRENT USE OF INSULIN (H): Primary | ICD-10-CM

## 2025-03-18 DIAGNOSIS — E87.6 HYPOKALEMIA: ICD-10-CM

## 2025-03-18 DIAGNOSIS — Z91.199 NON COMPLIANCE WITH MEDICAL TREATMENT: ICD-10-CM

## 2025-03-18 DIAGNOSIS — Z79.4 TYPE 2 DIABETES MELLITUS WITH HYPERGLYCEMIA, WITH LONG-TERM CURRENT USE OF INSULIN (H): Primary | ICD-10-CM

## 2025-03-18 LAB
EST. AVERAGE GLUCOSE BLD GHB EST-MCNC: 361 MG/DL
HBA1C MFR BLD: 14.2 % (ref 0–5.6)

## 2025-03-18 PROCEDURE — 99214 OFFICE O/P EST MOD 30 MIN: CPT | Performed by: PHYSICIAN ASSISTANT

## 2025-03-18 PROCEDURE — 36415 COLL VENOUS BLD VENIPUNCTURE: CPT | Performed by: PHYSICIAN ASSISTANT

## 2025-03-18 PROCEDURE — 80061 LIPID PANEL: CPT | Performed by: PHYSICIAN ASSISTANT

## 2025-03-18 PROCEDURE — G2211 COMPLEX E/M VISIT ADD ON: HCPCS | Performed by: PHYSICIAN ASSISTANT

## 2025-03-18 PROCEDURE — 1126F AMNT PAIN NOTED NONE PRSNT: CPT | Performed by: PHYSICIAN ASSISTANT

## 2025-03-18 PROCEDURE — 3078F DIAST BP <80 MM HG: CPT | Performed by: PHYSICIAN ASSISTANT

## 2025-03-18 PROCEDURE — 80048 BASIC METABOLIC PNL TOTAL CA: CPT | Performed by: PHYSICIAN ASSISTANT

## 2025-03-18 PROCEDURE — 99207 PR FOOT EXAM NO CHARGE: CPT | Performed by: PHYSICIAN ASSISTANT

## 2025-03-18 PROCEDURE — 99000 SPECIMEN HANDLING OFFICE-LAB: CPT | Performed by: PHYSICIAN ASSISTANT

## 2025-03-18 PROCEDURE — 86341 ISLET CELL ANTIBODY: CPT | Mod: 90 | Performed by: PHYSICIAN ASSISTANT

## 2025-03-18 PROCEDURE — 3074F SYST BP LT 130 MM HG: CPT | Performed by: PHYSICIAN ASSISTANT

## 2025-03-18 PROCEDURE — 84681 ASSAY OF C-PEPTIDE: CPT | Performed by: PHYSICIAN ASSISTANT

## 2025-03-18 PROCEDURE — 83036 HEMOGLOBIN GLYCOSYLATED A1C: CPT | Performed by: PHYSICIAN ASSISTANT

## 2025-03-18 PROCEDURE — 82043 UR ALBUMIN QUANTITATIVE: CPT | Performed by: PHYSICIAN ASSISTANT

## 2025-03-18 PROCEDURE — 82570 ASSAY OF URINE CREATININE: CPT | Performed by: PHYSICIAN ASSISTANT

## 2025-03-18 RX ORDER — ATORVASTATIN CALCIUM 10 MG/1
10 TABLET, FILM COATED ORAL DAILY
Qty: 90 TABLET | Refills: 1 | Status: SHIPPED | OUTPATIENT
Start: 2025-03-18

## 2025-03-18 RX ORDER — GLIPIZIDE 10 MG/1
20 TABLET, FILM COATED, EXTENDED RELEASE ORAL DAILY
Qty: 180 TABLET | Refills: 1 | Status: SHIPPED | OUTPATIENT
Start: 2025-03-18

## 2025-03-18 RX ORDER — METFORMIN HYDROCHLORIDE 500 MG/1
2000 TABLET, EXTENDED RELEASE ORAL
Qty: 360 TABLET | Refills: 1 | Status: SHIPPED | OUTPATIENT
Start: 2025-03-18

## 2025-03-18 RX ORDER — LISINOPRIL 2.5 MG/1
2.5 TABLET ORAL DAILY
Qty: 90 TABLET | Refills: 1 | Status: SHIPPED | OUTPATIENT
Start: 2025-03-18

## 2025-03-18 RX ORDER — POTASSIUM CHLORIDE 750 MG/1
10 TABLET, EXTENDED RELEASE ORAL DAILY
Qty: 90 TABLET | Refills: 1 | Status: SHIPPED | OUTPATIENT
Start: 2025-03-18

## 2025-03-18 ASSESSMENT — PAIN SCALES - GENERAL: PAINLEVEL_OUTOF10: NO PAIN (0)
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
## 2025-03-18 NOTE — PROGRESS NOTES
Assessment & Plan   Problem List Items Addressed This Visit          Endocrine    Type 2 diabetes mellitus with hyperglycemia, with long-term current use of insulin (H) - Primary    Relevant Medications    metFORMIN (GLUCOPHAGE XR) 500 MG 24 hr tablet    lisinopril (ZESTRIL) 2.5 MG tablet    glipiZIDE (GLUCOTROL XL) 10 MG 24 hr tablet    atorvastatin (LIPITOR) 10 MG tablet    potassium chloride ER (K-TAB/KLOR-CON) 10 MEQ CR tablet    sitagliptin (JANUVIA) 50 MG tablet    Other Relevant Orders    Lipid panel reflex to direct LDL Fasting    Albumin Random Urine Quantitative with Creat Ratio    HEMOGLOBIN A1C    BASIC METABOLIC PANEL    C-peptide    Glutamic acid decarboxylase antibody       Behavioral    Non compliance with medical treatment     Other Visit Diagnoses       Hypokalemia        Relevant Medications    potassium chloride ER (K-TAB/KLOR-CON) 10 MEQ CR tablet         Patient declined to start any injectable               Tyson Hernandez is a 44 year old, presenting for the following health issues:  Diabetes (Follow up)        3/18/2025    11:10 AM   Additional Questions   Roomed by Sierra   Accompanied by self         3/18/2025    11:10 AM   Patient Reported Additional Medications   Patient reports taking the following new medications no     History of Present Illness       Diabetes:   He presents for follow up of diabetes.  He is checking home blood glucose a few times a week.   He checks blood glucose before meals.  Blood glucose is sometimes over 200 and never under 70. He is aware of hypoglycemia symptoms including blurred vision.    He has no concerns regarding his diabetes at this time.  He is having weight loss.  The patient has not had a diabetic eye exam in the last 12 months.          He eats 0-1 servings of fruits and vegetables daily.He consumes 1 sweetened beverage(s) daily.He exercises with enough effort to increase his heart rate 10 to 19 minutes per day.  He exercises with enough  effort to increase his heart rate 3 or less days per week.   He is taking medications regularly.          Diabetes Follow-up    How often are you checking your blood sugar? A few times a week  What time of day are you checking your blood sugars (select all that apply)?  Before meals  Have you had any blood sugars above 200?  Yes 273  Have you had any blood sugars below 70?  No  What symptoms do you notice when your blood sugar is low?  None  What concerns do you have today about your diabetes? None   Do you have any of these symptoms? (Select all that apply)  No numbness or tingling in feet.  No redness, sores or blisters on feet.  No complaints of excessive thirst.  No reports of blurry vision.  No significant changes to weight.  Have you had a diabetic eye exam in the last 12 months? No        {Reference  Diabetes Management Resources  Blood Glucose Log - 3 weeks  Blood Glucose Log with Food and Insulin Record :832707}    Hyperlipidemia Follow-Up    Are you regularly taking any medication or supplement to lower your cholesterol?   { :172932}  Are you having muscle aches or other side effects that you think could be caused by your cholesterol lowering medication?  { :410625}    Hypertension Follow-up    Do you check your blood pressure regularly outside of the clinic? { :673446}   Are you following a low salt diet? { :668975}  Are your blood pressures ever more than 140 on the top number (systolic) OR more   than 90 on the bottom number (diastolic), for example 140/90? { :851286}    BP Readings from Last 2 Encounters:   03/18/25 125/71   04/09/24 121/80     Hemoglobin A1C (%)   Date Value   04/09/2024 10.8 (H)   09/18/2023 14.8 (H)   03/26/2021 6.7 (H)   12/08/2020 10.9 (H)     LDL Cholesterol Calculated (mg/dL)   Date Value   09/18/2023 128 (H)   07/07/2022 87   12/08/2020 125 (H)   09/05/2019 101 (H)     LDL Cholesterol Direct (mg/dL)   Date Value   03/26/2021 68     How many servings of fruits and vegetables  "do you eat daily?  { :201589}  On average, how many sweetened beverages do you drink each day (Examples: soda, juice, sweet tea, etc.  Do NOT count diet or artificially sweetened beverages)?   { 1-11:737240}  How many days per week do you exercise enough to make your heart beat faster? { :440774}  How many minutes a day do you exercise enough to make your heart beat faster? { :651545}  How many days per week do you miss taking your medication? {0-7 :230780}  {additonal problems for provider to add (Optional):790630}    {ROS Picklists (Optional):867491}      Objective    /71 (BP Location: Left arm, Patient Position: Sitting, Cuff Size: Adult Regular)   Pulse 78   Temp 98.2  F (36.8  C) (Oral)   Resp 16   Ht 1.803 m (5' 11\")   Wt 72.5 kg (159 lb 12.8 oz)   SpO2 97%   BMI 22.29 kg/m    Body mass index is 22.29 kg/m .  Physical Exam   {Exam List (Optional):111609}    {Diagnostic Test Results (Optional):590847}        Signed Electronically by: Rose Haddad PA-C  {Email feedback regarding this note to [email or web address]   :911335}  " Calculated 123 (H) <100 mg/dL    Non HDL Cholesterol 141 (H) <130 mg/dL    Patient Fasting > 8hrs? Yes     Narrative    Cholesterol  Desirable: < 200 mg/dL  Borderline High: 200 - 239 mg/dL  High: >= 240 mg/dL    Triglycerides  Normal: < 150 mg/dL  Borderline High: 150 - 199 mg/dL  High: 200-499 mg/dL  Very High: >= 500 mg/dL    Direct Measure HDL  Female: >= 50 mg/dL   Male: >= 40 mg/dL    LDL Cholesterol  Desirable: < 100 mg/dL  Above Desirable: 100 - 129 mg/dL   Borderline High: 130 - 159 mg/dL   High:  160 - 189 mg/dL   Very High: >= 190 mg/dL    Non HDL Cholesterol  Desirable: < 130 mg/dL  Above Desirable: 130 - 159 mg/dL  Borderline High: 160 - 189 mg/dL  High: 190 - 219 mg/dL  Very High: >= 220 mg/dL   Albumin Random Urine Quantitative with Creat Ratio     Status: None   Result Value Ref Range    Creatinine Urine mg/dL 77.2 mg/dL    Albumin Urine mg/L <12.0 mg/L    Albumin Urine mg/g Cr     HEMOGLOBIN A1C     Status: Abnormal   Result Value Ref Range    Estimated Average Glucose 361 (H) <117 mg/dL    Hemoglobin A1C 14.2 (H) 0.0 - 5.6 %    Narrative    Results consistent with previous, repeat testing unnecessary     BASIC METABOLIC PANEL     Status: Abnormal   Result Value Ref Range    Sodium 137 135 - 145 mmol/L    Potassium 4.2 3.4 - 5.3 mmol/L    Chloride 100 98 - 107 mmol/L    Carbon Dioxide (CO2) 27 22 - 29 mmol/L    Anion Gap 10 7 - 15 mmol/L    Urea Nitrogen 12.1 6.0 - 20.0 mg/dL    Creatinine 0.95 0.67 - 1.17 mg/dL    GFR Estimate >90 >60 mL/min/1.73m2    Calcium 9.5 8.8 - 10.4 mg/dL    Glucose 281 (H) 70 - 99 mg/dL    Patient Fasting > 8hrs? Yes    C-peptide     Status: None   Result Value Ref Range    C Peptide 1.5 0.9 - 6.9 ng/mL    Patient Fasting > 8hrs? Yes            Signed Electronically by: Rose Haddad PA-C

## 2025-03-18 NOTE — PATIENT INSTRUCTIONS
At [facility], we strive to deliver an exceptional experience to you, every time we see you. If you receive a survey, please let us know what we are doing well and/or what we could improve upon, as we do value your feedback.  If you have MyChart, you can expect to receive results automatically within 24 hours of their completion.  Your provider will send a note interpreting your results as well.   If you do not have MyChart, you should receive your results in about a week by mail.    Your care team:                            Family Medicine Internal Medicine   MD Kemar Nguyen, MD Netta Vasquez, MD Nils Sams, MD Kalie Haddad, PAArC    Johnathan Estevez, MD Pediatrics   Salma Bunch, MD Estephania Judd, MD Sarah Edward, APRN CNP Eleanor Soni APRN CNP   MD Ita Godinez, MD Cinthia Eckert, CNP     Rusty Condon, CNP Same-Day Provider (No follow-up visits)   ÁLVARO Cruz, DNP Terese Griffith, ÁLVARO Silva, FNP, BC JAGDISH JacksonC     Clinic hours: Monday - Thursday 7 am-6 pm; Fridays 7 am-5 pm.   Urgent care: Monday - Friday 10 am- 8 pm; Saturday and Sunday 9 am-5 pm.    Clinic: [phone]       [facility]: Monday - Thursday 8 am - 7 pm; Friday 8 am - 6 pm  [facility]: [phone]

## 2025-03-19 LAB
ANION GAP SERPL CALCULATED.3IONS-SCNC: 10 MMOL/L (ref 7–15)
BUN SERPL-MCNC: 12.1 MG/DL (ref 6–20)
C PEPTIDE SERPL-MCNC: 1.5 NG/ML (ref 0.9–6.9)
CALCIUM SERPL-MCNC: ABNORMAL MG/DL
CHLORIDE SERPL-SCNC: 100 MMOL/L (ref 98–107)
CHOLEST SERPL-MCNC: 188 MG/DL
CREAT SERPL-MCNC: 0.95 MG/DL (ref 0.67–1.17)
CREAT UR-MCNC: 77.2 MG/DL
EGFRCR SERPLBLD CKD-EPI 2021: >90 ML/MIN/1.73M2
FASTING STATUS PATIENT QL REPORTED: YES
GLUCOSE SERPL-MCNC: 281 MG/DL (ref 70–99)
HCO3 SERPL-SCNC: 27 MMOL/L (ref 22–29)
HDLC SERPL-MCNC: 47 MG/DL
LDLC SERPL CALC-MCNC: 123 MG/DL
MICROALBUMIN UR-MCNC: <12 MG/L
MICROALBUMIN/CREAT UR: NORMAL MG/G{CREAT}
NONHDLC SERPL-MCNC: 141 MG/DL
POTASSIUM SERPL-SCNC: 4.2 MMOL/L (ref 3.4–5.3)
SODIUM SERPL-SCNC: 137 MMOL/L (ref 135–145)
TRIGL SERPL-MCNC: 92 MG/DL

## 2025-03-20 LAB — GAD65 AB SER IA-ACNC: <5 IU/ML

## 2025-03-21 LAB
ANION GAP SERPL CALCULATED.3IONS-SCNC: 10 MMOL/L (ref 7–15)
BUN SERPL-MCNC: 12.1 MG/DL (ref 6–20)
CALCIUM SERPL-MCNC: 9.5 MG/DL (ref 8.8–10.4)
CHLORIDE SERPL-SCNC: 100 MMOL/L (ref 98–107)
CREAT SERPL-MCNC: 0.95 MG/DL (ref 0.67–1.17)
EGFRCR SERPLBLD CKD-EPI 2021: >90 ML/MIN/1.73M2
FASTING STATUS PATIENT QL REPORTED: YES
GLUCOSE SERPL-MCNC: 281 MG/DL (ref 70–99)
HCO3 SERPL-SCNC: 27 MMOL/L (ref 22–29)
POTASSIUM SERPL-SCNC: 4.2 MMOL/L (ref 3.4–5.3)
SODIUM SERPL-SCNC: 137 MMOL/L (ref 135–145)

## 2025-03-25 ENCOUNTER — TELEPHONE (OUTPATIENT)
Dept: FAMILY MEDICINE | Facility: CLINIC | Age: 45
End: 2025-03-25
Payer: COMMERCIAL

## 2025-03-25 NOTE — TELEPHONE ENCOUNTER
Pt notified of provider message as written.  Pt verbalized good understanding.  Jo Power BSN, RN

## 2025-03-25 NOTE — RESULT ENCOUNTER NOTE
Please call the patient with these results:    Your A1C is very high at 14.2.   Since you declined insulin , please start taking your oral medications every day  take Metformin 2,000mg - 4 tablets daily ,   Glipizide 20 mg daily-2 tbalets ,   start Januvia 50 mg daily -1 tablet   Take Atorvastatin 10 mg  and Lisinopril 2.5 mg daily as well.   Please follow up in 3 months   Rose Haddad PAC

## 2025-03-25 NOTE — TELEPHONE ENCOUNTER
This writer attempted to contact patient on 03/25/25      Reason for call results and left message.      If patient calls back:   Relay message below, (read verbatim), document that pt called and close encounter        Chanelle Ramirez RN    ----- Message from Shirley MOLINA sent at 3/25/2025  2:21 PM CDT -----    ----- Message -----  From: Rose Haddad PA-C  Sent: 3/25/2025   2:17 PM CDT  To: [facility] Pool    Please call the patient with these results:    Your A1C is very high at 14.2.   Since you declined insulin , please start taking your oral medications every day  take Metformin 2,000mg - 4 tablets daily ,   Glipizide 20 mg daily-2 tbalets ,   start Januvia 50 mg daily -1 tablet   Take Atorvastatin 10 mg  and Lisinopril 2.5 mg daily as well.   Please follow up in 3 months   Rose Haddad PAC

## 2025-05-26 ENCOUNTER — PATIENT OUTREACH (OUTPATIENT)
Dept: CARE COORDINATION | Facility: CLINIC | Age: 45
End: 2025-05-26
Payer: COMMERCIAL